# Patient Record
Sex: FEMALE | Race: OTHER | HISPANIC OR LATINO | URBAN - METROPOLITAN AREA
[De-identification: names, ages, dates, MRNs, and addresses within clinical notes are randomized per-mention and may not be internally consistent; named-entity substitution may affect disease eponyms.]

---

## 2017-01-05 ENCOUNTER — EMERGENCY (EMERGENCY)
Facility: HOSPITAL | Age: 42
LOS: 1 days | Discharge: HOME | End: 2017-01-05
Attending: EMERGENCY MEDICINE
Payer: MEDICAID

## 2017-01-05 VITALS
OXYGEN SATURATION: 97 % | RESPIRATION RATE: 18 BRPM | TEMPERATURE: 98 F | DIASTOLIC BLOOD PRESSURE: 90 MMHG | HEART RATE: 117 BPM | SYSTOLIC BLOOD PRESSURE: 130 MMHG

## 2017-01-05 DIAGNOSIS — N64.4 MASTODYNIA: ICD-10-CM

## 2017-01-05 DIAGNOSIS — N63 UNSPECIFIED LUMP IN BREAST: ICD-10-CM

## 2017-01-05 PROCEDURE — T1013: CPT

## 2017-01-05 PROCEDURE — 76604 US EXAM CHEST: CPT

## 2017-01-05 PROCEDURE — 99284 EMERGENCY DEPT VISIT MOD MDM: CPT | Mod: 25

## 2017-01-05 PROCEDURE — 76604 US EXAM CHEST: CPT | Mod: 26

## 2017-01-05 PROCEDURE — 99284 EMERGENCY DEPT VISIT MOD MDM: CPT

## 2017-01-05 RX ORDER — IBUPROFEN 200 MG
1 TABLET ORAL
Qty: 30 | Refills: 0 | OUTPATIENT
Start: 2017-01-05

## 2017-01-05 RX ORDER — FAMOTIDINE 10 MG/ML
1 INJECTION INTRAVENOUS
Qty: 60 | Refills: 0 | OUTPATIENT
Start: 2017-01-05 | End: 2017-02-04

## 2017-01-05 NOTE — ED STATDOCS - DETAILS:
The history, relevant review of systems, past medical and surgical history, medical decision making, and physical examination was documented by the scribe in my presence and I, Kaitlin Plata, attest to the accuracy of the documentation.

## 2017-01-05 NOTE — ED ADULT TRIAGE NOTE - CHIEF COMPLAINT QUOTE
Patient arrived to ED today with c/o pain to her right breast.  Patient states she has swelling to her right breast and hardness to her right breast.  Patient states she has had cyst removed in the past from left breast.

## 2017-01-05 NOTE — ED STATDOCS - PROGRESS NOTE DETAILS
pt advised she has a cyst vs mass in R breast and she was told she MUST get follow up as this xould be a neoplasm.

## 2017-01-05 NOTE — ED STATDOCS - SKIN COLOR, MLM
Ebonie as female chaperone during exam. no masses or induration. No discharge from nipple. No b/l erythema. Mild tenderness to b/l breast.

## 2017-01-05 NOTE — ED STATDOCS - OBJECTIVE STATEMENT
This is a 40 y/o F with hx of gastritis and cysts s/p cyst removal c/o breast pain x 8 days. Denies CP, cough, and fever.  Ebonie at bedside.

## 2017-01-05 NOTE — ED STATDOCS - NS ED MD SCRIBE ATTENDING SCRIBE SECTIONS
HISTORY OF PRESENT ILLNESS/REVIEW OF SYSTEMS/PAST MEDICAL/SURGICAL/SOCIAL HISTORY/HIV/VITAL SIGNS( Pullset)/PHYSICAL EXAM/DISPOSITION

## 2017-01-19 ENCOUNTER — APPOINTMENT (OUTPATIENT)
Dept: TRAUMA SURGERY | Facility: CLINIC | Age: 42
End: 2017-01-19

## 2017-01-19 VITALS
HEART RATE: 106 BPM | WEIGHT: 152 LBS | OXYGEN SATURATION: 98 % | RESPIRATION RATE: 16 BRPM | HEIGHT: 62.5 IN | TEMPERATURE: 98.8 F | DIASTOLIC BLOOD PRESSURE: 88 MMHG | SYSTOLIC BLOOD PRESSURE: 139 MMHG | BODY MASS INDEX: 27.27 KG/M2

## 2017-01-20 ENCOUNTER — APPOINTMENT (OUTPATIENT)
Dept: SURGERY | Facility: CLINIC | Age: 42
End: 2017-01-20

## 2017-01-20 VITALS
WEIGHT: 154 LBS | BODY MASS INDEX: 28.7 KG/M2 | OXYGEN SATURATION: 98 % | TEMPERATURE: 98.7 F | RESPIRATION RATE: 16 BRPM | DIASTOLIC BLOOD PRESSURE: 80 MMHG | HEIGHT: 61.5 IN | SYSTOLIC BLOOD PRESSURE: 122 MMHG | HEART RATE: 107 BPM

## 2017-01-20 DIAGNOSIS — Z87.2 PERSONAL HISTORY OF DISEASES OF THE SKIN AND SUBCUTANEOUS TISSUE: ICD-10-CM

## 2017-01-23 ENCOUNTER — APPOINTMENT (OUTPATIENT)
Dept: MAMMOGRAPHY | Facility: CLINIC | Age: 42
End: 2017-01-23

## 2017-01-23 ENCOUNTER — APPOINTMENT (OUTPATIENT)
Dept: ULTRASOUND IMAGING | Facility: CLINIC | Age: 42
End: 2017-01-23

## 2017-01-23 ENCOUNTER — OUTPATIENT (OUTPATIENT)
Dept: OUTPATIENT SERVICES | Facility: HOSPITAL | Age: 42
LOS: 1 days | End: 2017-01-23
Payer: MEDICAID

## 2017-01-23 DIAGNOSIS — Z00.8 ENCOUNTER FOR OTHER GENERAL EXAMINATION: ICD-10-CM

## 2017-01-23 PROCEDURE — 77066 DX MAMMO INCL CAD BI: CPT

## 2017-01-23 PROCEDURE — 76641 ULTRASOUND BREAST COMPLETE: CPT

## 2017-01-23 PROCEDURE — G0279: CPT

## 2017-01-25 ENCOUNTER — RESULT REVIEW (OUTPATIENT)
Age: 42
End: 2017-01-25

## 2017-01-26 ENCOUNTER — APPOINTMENT (OUTPATIENT)
Dept: ULTRASOUND IMAGING | Facility: CLINIC | Age: 42
End: 2017-01-26

## 2017-01-26 ENCOUNTER — OUTPATIENT (OUTPATIENT)
Dept: OUTPATIENT SERVICES | Facility: HOSPITAL | Age: 42
LOS: 1 days | End: 2017-01-26
Payer: MEDICAID

## 2017-01-26 DIAGNOSIS — R92.8 OTHER ABNORMAL AND INCONCLUSIVE FINDINGS ON DIAGNOSTIC IMAGING OF BREAST: ICD-10-CM

## 2017-01-26 PROCEDURE — 77066 DX MAMMO INCL CAD BI: CPT

## 2017-01-26 PROCEDURE — 19083 BX BREAST 1ST LESION US IMAG: CPT

## 2017-01-26 PROCEDURE — A4648: CPT

## 2017-02-02 ENCOUNTER — APPOINTMENT (OUTPATIENT)
Dept: SURGERY | Facility: CLINIC | Age: 42
End: 2017-02-02

## 2017-02-02 VITALS
SYSTOLIC BLOOD PRESSURE: 137 MMHG | WEIGHT: 154 LBS | OXYGEN SATURATION: 99 % | DIASTOLIC BLOOD PRESSURE: 96 MMHG | HEIGHT: 61.5 IN | HEART RATE: 84 BPM | BODY MASS INDEX: 28.7 KG/M2 | RESPIRATION RATE: 17 BRPM | TEMPERATURE: 98.8 F

## 2017-09-11 ENCOUNTER — APPOINTMENT (OUTPATIENT)
Dept: FAMILY MEDICINE | Facility: CLINIC | Age: 42
End: 2017-09-11
Payer: MEDICAID

## 2017-09-11 VITALS
TEMPERATURE: 98.2 F | DIASTOLIC BLOOD PRESSURE: 88 MMHG | HEART RATE: 68 BPM | BODY MASS INDEX: 33.55 KG/M2 | SYSTOLIC BLOOD PRESSURE: 144 MMHG | WEIGHT: 180 LBS | OXYGEN SATURATION: 99 % | HEIGHT: 61.5 IN

## 2017-09-11 DIAGNOSIS — Z76.89 PERSONS ENCOUNTERING HEALTH SERVICES IN OTHER SPECIFIED CIRCUMSTANCES: ICD-10-CM

## 2017-09-11 DIAGNOSIS — Z83.3 FAMILY HISTORY OF DIABETES MELLITUS: ICD-10-CM

## 2017-09-11 PROCEDURE — 99204 OFFICE O/P NEW MOD 45 MIN: CPT | Mod: 25

## 2017-09-11 PROCEDURE — 93000 ELECTROCARDIOGRAM COMPLETE: CPT

## 2017-09-11 PROCEDURE — 36415 COLL VENOUS BLD VENIPUNCTURE: CPT

## 2017-09-14 ENCOUNTER — RX RENEWAL (OUTPATIENT)
Age: 42
End: 2017-09-14

## 2017-09-14 LAB
ALBUMIN SERPL ELPH-MCNC: 4.3 G/DL
ALP BLD-CCNC: 45 U/L
ALT SERPL-CCNC: 20 U/L
ANION GAP SERPL CALC-SCNC: 16 MMOL/L
AST SERPL-CCNC: 15 U/L
BASOPHILS # BLD AUTO: 0.03 K/UL
BASOPHILS NFR BLD AUTO: 0.4 %
BILIRUB SERPL-MCNC: 0.5 MG/DL
BUN SERPL-MCNC: 11 MG/DL
CALCIUM SERPL-MCNC: 9.4 MG/DL
CHLORIDE SERPL-SCNC: 100 MMOL/L
CHOLEST SERPL-MCNC: 184 MG/DL
CHOLEST/HDLC SERPL: 5 RATIO
CO2 SERPL-SCNC: 24 MMOL/L
CREAT SERPL-MCNC: 0.85 MG/DL
EOSINOPHIL # BLD AUTO: 0.14 K/UL
EOSINOPHIL NFR BLD AUTO: 1.8 %
GLUCOSE SERPL-MCNC: 159 MG/DL
HCT VFR BLD CALC: 42.6 %
HDLC SERPL-MCNC: 37 MG/DL
HGB BLD-MCNC: 14.1 G/DL
IMM GRANULOCYTES NFR BLD AUTO: 0.3 %
INR PPP: 0.95 RATIO
LDLC SERPL CALC-MCNC: 108 MG/DL
LYMPHOCYTES # BLD AUTO: 2.98 K/UL
LYMPHOCYTES NFR BLD AUTO: 38.6 %
MAN DIFF?: NORMAL
MCHC RBC-ENTMCNC: 29 PG
MCHC RBC-ENTMCNC: 33.1 GM/DL
MCV RBC AUTO: 87.5 FL
MONOCYTES # BLD AUTO: 0.5 K/UL
MONOCYTES NFR BLD AUTO: 6.5 %
NEUTROPHILS # BLD AUTO: 4.05 K/UL
NEUTROPHILS NFR BLD AUTO: 52.4 %
PLATELET # BLD AUTO: 315 K/UL
POTASSIUM SERPL-SCNC: 3.4 MMOL/L
PROT SERPL-MCNC: 7.5 G/DL
PT BLD: 10.7 SEC
RBC # BLD: 4.87 M/UL
RBC # FLD: 13.6 %
SODIUM SERPL-SCNC: 140 MMOL/L
TRIGL SERPL-MCNC: 197 MG/DL
TSH SERPL-ACNC: 8.13 UIU/ML
WBC # FLD AUTO: 7.72 K/UL

## 2017-09-29 ENCOUNTER — APPOINTMENT (OUTPATIENT)
Dept: FAMILY MEDICINE | Facility: CLINIC | Age: 42
End: 2017-09-29
Payer: MEDICAID

## 2017-09-29 VITALS
HEIGHT: 61.5 IN | HEART RATE: 75 BPM | OXYGEN SATURATION: 99 % | SYSTOLIC BLOOD PRESSURE: 134 MMHG | DIASTOLIC BLOOD PRESSURE: 73 MMHG | BODY MASS INDEX: 33.36 KG/M2 | TEMPERATURE: 98.2 F | WEIGHT: 179 LBS

## 2017-09-29 DIAGNOSIS — Z01.419 ENCOUNTER FOR GYNECOLOGICAL EXAMINATION (GENERAL) (ROUTINE) W/OUT ABNORMAL FINDINGS: ICD-10-CM

## 2017-09-29 PROCEDURE — 90686 IIV4 VACC NO PRSV 0.5 ML IM: CPT

## 2017-09-29 PROCEDURE — 99214 OFFICE O/P EST MOD 30 MIN: CPT | Mod: 25

## 2017-09-29 PROCEDURE — G0008: CPT

## 2017-10-05 LAB — CYTOLOGY CVX/VAG DOC THIN PREP: NORMAL

## 2017-11-10 ENCOUNTER — APPOINTMENT (OUTPATIENT)
Dept: FAMILY MEDICINE | Facility: CLINIC | Age: 42
End: 2017-11-10
Payer: MEDICAID

## 2017-11-10 VITALS
SYSTOLIC BLOOD PRESSURE: 133 MMHG | OXYGEN SATURATION: 100 % | WEIGHT: 177 LBS | HEIGHT: 61.5 IN | TEMPERATURE: 98.1 F | HEART RATE: 63 BPM | DIASTOLIC BLOOD PRESSURE: 79 MMHG | BODY MASS INDEX: 32.99 KG/M2

## 2017-11-10 PROCEDURE — 99214 OFFICE O/P EST MOD 30 MIN: CPT | Mod: 25

## 2017-11-10 PROCEDURE — 36415 COLL VENOUS BLD VENIPUNCTURE: CPT

## 2017-11-13 ENCOUNTER — APPOINTMENT (OUTPATIENT)
Dept: SURGERY | Facility: CLINIC | Age: 42
End: 2017-11-13
Payer: MEDICAID

## 2017-11-13 ENCOUNTER — APPOINTMENT (OUTPATIENT)
Dept: SURGERY | Facility: CLINIC | Age: 42
End: 2017-11-13

## 2017-11-13 VITALS
SYSTOLIC BLOOD PRESSURE: 123 MMHG | TEMPERATURE: 98.3 F | DIASTOLIC BLOOD PRESSURE: 81 MMHG | BODY MASS INDEX: 33.42 KG/M2 | RESPIRATION RATE: 12 BRPM | HEIGHT: 61 IN | HEART RATE: 73 BPM | WEIGHT: 177 LBS

## 2017-11-13 DIAGNOSIS — F15.90 OTHER STIMULANT USE, UNSPECIFIED, UNCOMPLICATED: ICD-10-CM

## 2017-11-13 LAB
T3FREE SERPL-MCNC: 3.35 PG/ML
T4 FREE SERPL-MCNC: 1.4 NG/DL
TSH SERPL-ACNC: 5.94 UIU/ML

## 2017-11-13 PROCEDURE — 99214 OFFICE O/P EST MOD 30 MIN: CPT

## 2017-11-15 ENCOUNTER — RX RENEWAL (OUTPATIENT)
Age: 42
End: 2017-11-15

## 2017-11-26 PROBLEM — F15.90 CAFFEINE USE: Status: ACTIVE | Noted: 2017-01-19

## 2018-01-29 ENCOUNTER — OUTPATIENT (OUTPATIENT)
Dept: OUTPATIENT SERVICES | Facility: HOSPITAL | Age: 43
LOS: 1 days | End: 2018-01-29
Payer: MEDICAID

## 2018-01-29 ENCOUNTER — APPOINTMENT (OUTPATIENT)
Dept: MAMMOGRAPHY | Facility: CLINIC | Age: 43
End: 2018-01-29
Payer: MEDICAID

## 2018-01-29 ENCOUNTER — APPOINTMENT (OUTPATIENT)
Dept: ULTRASOUND IMAGING | Facility: CLINIC | Age: 43
End: 2018-01-29
Payer: MEDICAID

## 2018-01-29 DIAGNOSIS — Z00.8 ENCOUNTER FOR OTHER GENERAL EXAMINATION: ICD-10-CM

## 2018-01-29 PROCEDURE — 77067 SCR MAMMO BI INCL CAD: CPT

## 2018-01-29 PROCEDURE — 76641 ULTRASOUND BREAST COMPLETE: CPT

## 2018-01-29 PROCEDURE — 77067 SCR MAMMO BI INCL CAD: CPT | Mod: 26

## 2018-01-29 PROCEDURE — 77063 BREAST TOMOSYNTHESIS BI: CPT | Mod: 26

## 2018-01-29 PROCEDURE — 76641 ULTRASOUND BREAST COMPLETE: CPT | Mod: 26,50

## 2018-01-29 PROCEDURE — 77063 BREAST TOMOSYNTHESIS BI: CPT

## 2018-02-16 ENCOUNTER — APPOINTMENT (OUTPATIENT)
Dept: SURGERY | Facility: CLINIC | Age: 43
End: 2018-02-16

## 2018-03-19 ENCOUNTER — APPOINTMENT (OUTPATIENT)
Dept: SURGERY | Facility: CLINIC | Age: 43
End: 2018-03-19
Payer: MEDICAID

## 2018-03-19 VITALS
OXYGEN SATURATION: 100 % | HEART RATE: 81 BPM | DIASTOLIC BLOOD PRESSURE: 88 MMHG | WEIGHT: 177 LBS | SYSTOLIC BLOOD PRESSURE: 130 MMHG | BODY MASS INDEX: 33.42 KG/M2 | TEMPERATURE: 98 F | HEIGHT: 61 IN

## 2018-03-19 DIAGNOSIS — D24.2 BENIGN NEOPLASM OF LEFT BREAST: ICD-10-CM

## 2018-03-19 PROCEDURE — 99214 OFFICE O/P EST MOD 30 MIN: CPT

## 2018-03-19 RX ORDER — LEVOTHYROXINE SODIUM 0.03 MG/1
25 TABLET ORAL DAILY
Qty: 90 | Refills: 1 | Status: DISCONTINUED | COMMUNITY
Start: 2017-09-14 | End: 2018-03-19

## 2018-03-20 PROBLEM — D24.2 FIBROADENOMA OF LEFT BREAST: Status: ACTIVE | Noted: 2017-11-26

## 2018-05-23 ENCOUNTER — APPOINTMENT (OUTPATIENT)
Dept: FAMILY MEDICINE | Facility: CLINIC | Age: 43
End: 2018-05-23
Payer: MEDICAID

## 2018-05-23 VITALS
OXYGEN SATURATION: 98 % | TEMPERATURE: 98.7 F | BODY MASS INDEX: 33.26 KG/M2 | WEIGHT: 176 LBS | HEART RATE: 64 BPM | SYSTOLIC BLOOD PRESSURE: 139 MMHG | DIASTOLIC BLOOD PRESSURE: 86 MMHG

## 2018-05-23 PROCEDURE — 99214 OFFICE O/P EST MOD 30 MIN: CPT

## 2018-05-23 NOTE — HISTORY OF PRESENT ILLNESS
[FreeTextEntry1] : meds refills [de-identified] : 43 y/o HF w/ Hx Breast fibroadenoma, recent Dx of Hypothyroidism.Not taking med for aprox 1 week.\par \par Pt states has heartburn, reflux and dyspepsia for aprox 3 months. Has OTC ranitidine with mild improvement of symptoms.\par Pt tolerating well meds. \par Normal BM.

## 2018-05-23 NOTE — CURRENT MEDS
[None] : Patient does not have any barriers to medication adherence [Takes medication as prescribed] : does not take

## 2018-05-23 NOTE — ASSESSMENT
[FreeTextEntry1] : -Hypothyroidism: reassume levothyroxine.\par \par -GERD: start famotifine. Avoid irritants foods, like coffe, sodas, citrus and spice .\par \par -Breast Fibroadenoma: seen by Dr Coon. Continue USG and Mammo annual.\par \par -Pt will schedule CPE.

## 2018-05-23 NOTE — PAST MEDICAL HISTORY
[Menstruating] : menstruating [Definite ___ (Date)] : the last menstrual period was [unfilled] [Total Preg ___] : G[unfilled] [Living ___] : Living: [unfilled]

## 2018-06-27 ENCOUNTER — LABORATORY RESULT (OUTPATIENT)
Age: 43
End: 2018-06-27

## 2018-06-27 ENCOUNTER — APPOINTMENT (OUTPATIENT)
Dept: FAMILY MEDICINE | Facility: CLINIC | Age: 43
End: 2018-06-27
Payer: MEDICAID

## 2018-06-27 VITALS
OXYGEN SATURATION: 97 % | BODY MASS INDEX: 33.23 KG/M2 | TEMPERATURE: 98.6 F | WEIGHT: 176 LBS | HEART RATE: 72 BPM | SYSTOLIC BLOOD PRESSURE: 129 MMHG | HEIGHT: 61 IN | DIASTOLIC BLOOD PRESSURE: 82 MMHG

## 2018-06-27 PROCEDURE — 36415 COLL VENOUS BLD VENIPUNCTURE: CPT

## 2018-06-27 PROCEDURE — G0444 DEPRESSION SCREEN ANNUAL: CPT

## 2018-06-27 PROCEDURE — 99396 PREV VISIT EST AGE 40-64: CPT | Mod: 25

## 2018-06-27 RX ORDER — AMOXICILLIN 500 MG/1
500 CAPSULE ORAL
Qty: 21 | Refills: 0 | Status: DISCONTINUED | COMMUNITY
Start: 2018-02-13

## 2018-06-27 NOTE — COUNSELING
[Weight management counseling provided] : Weight management [Healthy eating counseling provided] : healthy eating [Activity counseling provided] : activity [Behavioral health counseling provided] : behavioral health  [Target Wt Loss Goal ___] : Target weight loss goal [unfilled] lbs [Decrease Portions] : Decrease food portions [Walking] : Walking [None] : None [Good understanding] : Patient has a good understanding of lifestyle changes and the steps needed to achieve self management goals [ - Annual Depression Screening] : Annual Depression Screening

## 2018-06-27 NOTE — HEALTH RISK ASSESSMENT
[Good] : ~his/her~  mood as  good [No falls in past year] : Patient reported no falls in the past year [0] : 2) Feeling down, depressed, or hopeless: Not at all (0) [Patient reported mammogram was normal] : Patient reported mammogram was normal [Patient reported PAP Smear was normal] : Patient reported PAP Smear was normal [HIV Test offered] : HIV Test offered [None] : None [With Family] : lives with family [Employed] : employed [] :  [# Of Children ___] : has [unfilled] children [Sexually Active] : sexually active [Feels Safe at Home] : Feels safe at home [Fully functional (bathing, dressing, toileting, transferring, walking, feeding)] : Fully functional (bathing, dressing, toileting, transferring, walking, feeding) [Fully functional (using the telephone, shopping, preparing meals, housekeeping, doing laundry, using] : Fully functional and needs no help or supervision to perform IADLs (using the telephone, shopping, preparing meals, housekeeping, doing laundry, using transportation, managing medications and managing finances) [Smoke Detector] : smoke detector [Seat Belt] :  uses seat belt [Discussed at today's visit] : Advance Directives Discussed at today's visit [Designated Healthcare Proxy] : Designated healthcare proxy [Name: ___] : Health Care Proxy's Name: [unfilled]  [Relationship: ___] : Relationship: [unfilled] [] : No [Change in mental status noted] : No change in mental status noted [Language] : denies difficulty with language [Reports changes in hearing] : Reports no changes in hearing [Reports changes in vision] : Reports no changes in vision [Reports changes in dental health] : Reports no changes in dental health [MammogramDate] : 2018 [PapSmearDate] : 2017 [HepatitisCComments] : N/A [FreeTextEntry2] : Verenice

## 2018-06-27 NOTE — HISTORY OF PRESENT ILLNESS
[FreeTextEntry1] : Annual physical [de-identified] : 44 y/o HF, w/ hx Breast Fibroadenoma, GERD presents today for annual physical.\par \par reports to feel better of GERd symptoms since started on famotidine.

## 2018-06-27 NOTE — ASSESSMENT
[FreeTextEntry1] : CPE: blood and UA today\par \par HIV: screening\par \par Mammo: up to date. w/ Dr Coon\par \par Hypothyroidism: On levothyroxine. TSH today.\par \par GERD: doing better. Advise prn famotidine.

## 2018-07-05 ENCOUNTER — APPOINTMENT (OUTPATIENT)
Dept: FAMILY MEDICINE | Facility: CLINIC | Age: 43
End: 2018-07-05
Payer: MEDICAID

## 2018-07-05 VITALS
HEIGHT: 61 IN | OXYGEN SATURATION: 98 % | TEMPERATURE: 98.3 F | WEIGHT: 173 LBS | DIASTOLIC BLOOD PRESSURE: 87 MMHG | BODY MASS INDEX: 32.66 KG/M2 | HEART RATE: 91 BPM | SYSTOLIC BLOOD PRESSURE: 143 MMHG

## 2018-07-05 PROCEDURE — 99214 OFFICE O/P EST MOD 30 MIN: CPT

## 2018-07-05 NOTE — HISTORY OF PRESENT ILLNESS
[FreeTextEntry1] : Abnormal lab results f/up [de-identified] : 42 y/o HF, presents today for f/up in abnormal lab results.\par \par Pt w/ glucose: 253, HbA1c: 11. No hx Diabetes in the past.

## 2018-07-05 NOTE — COUNSELING
[Weight management counseling provided] : Weight management [Healthy eating counseling provided] : healthy eating [Activity counseling provided] : activity [Behavioral health counseling provided] : behavioral health  [Weight Self Once Weekly] : Weight self once weekly [Keep Food Diary] : Keep food diary [Low Fat Diet] : Low fat diet [Decrease Portions] : Decrease food portions [Low Salt Diet] : Low salt diet [Walking] : Walking [None] : None [Good understanding] : Patient has a good understanding of lifestyle changes and the steps needed to achieve self management goals

## 2018-07-05 NOTE — ASSESSMENT
[FreeTextEntry1] : New Onset DM: Education and counseling was given to pt with diet guidance.\par \par -Start metformin 850mg BID.\par -Start Onglyza 5mg daily.\par -Risks and benefits d/w pt.\par -F/up in 4 weeks,.\par \par -Hypothyroidism: continue Levothyroxine.

## 2018-07-06 LAB
25(OH)D3 SERPL-MCNC: 22.2 NG/ML
ALBUMIN SERPL ELPH-MCNC: 4.2 G/DL
ALP BLD-CCNC: 69 U/L
ALT SERPL-CCNC: 31 U/L
ANION GAP SERPL CALC-SCNC: 14 MMOL/L
APPEARANCE: CLEAR
AST SERPL-CCNC: 17 U/L
BASOPHILS # BLD AUTO: 0.03 K/UL
BASOPHILS NFR BLD AUTO: 0.3 %
BILIRUB SERPL-MCNC: 0.6 MG/DL
BILIRUBIN URINE: NEGATIVE
BLOOD URINE: NEGATIVE
BUN SERPL-MCNC: 14 MG/DL
CALCIUM SERPL-MCNC: 9.4 MG/DL
CHLORIDE SERPL-SCNC: 99 MMOL/L
CHOLEST SERPL-MCNC: 188 MG/DL
CHOLEST/HDLC SERPL: 5.2 RATIO
CO2 SERPL-SCNC: 22 MMOL/L
COLOR: YELLOW
CREAT SERPL-MCNC: 0.82 MG/DL
EOSINOPHIL # BLD AUTO: 0.17 K/UL
EOSINOPHIL NFR BLD AUTO: 1.9 %
GLUCOSE QUALITATIVE U: 1000 MG/DL
GLUCOSE SERPL-MCNC: 253 MG/DL
HCT VFR BLD CALC: 45.6 %
HDLC SERPL-MCNC: 36 MG/DL
HGB BLD-MCNC: 14.9 G/DL
HIV1+2 AB SPEC QL IA.RAPID: NONREACTIVE
IMM GRANULOCYTES NFR BLD AUTO: 0.2 %
KETONES URINE: ABNORMAL
LDLC SERPL CALC-MCNC: 104 MG/DL
LEUKOCYTE ESTERASE URINE: NEGATIVE
LYMPHOCYTES # BLD AUTO: 3.28 K/UL
LYMPHOCYTES NFR BLD AUTO: 36.9 %
MAN DIFF?: NORMAL
MCHC RBC-ENTMCNC: 27.3 PG
MCHC RBC-ENTMCNC: 32.7 GM/DL
MCV RBC AUTO: 83.7 FL
MONOCYTES # BLD AUTO: 0.65 K/UL
MONOCYTES NFR BLD AUTO: 7.3 %
NEUTROPHILS # BLD AUTO: 4.74 K/UL
NEUTROPHILS NFR BLD AUTO: 53.4 %
NITRITE URINE: NEGATIVE
PH URINE: 5
PLATELET # BLD AUTO: 276 K/UL
POTASSIUM SERPL-SCNC: 4.3 MMOL/L
PROT SERPL-MCNC: 7.3 G/DL
PROTEIN URINE: NEGATIVE MG/DL
RBC # BLD: 5.45 M/UL
RBC # FLD: 13.2 %
SODIUM SERPL-SCNC: 135 MMOL/L
SPECIFIC GRAVITY URINE: 1.04
TRIGL SERPL-MCNC: 242 MG/DL
TSH SERPL-ACNC: 5.32 UIU/ML
UROBILINOGEN URINE: NEGATIVE MG/DL
WBC # FLD AUTO: 8.89 K/UL

## 2018-07-12 ENCOUNTER — RX RENEWAL (OUTPATIENT)
Age: 43
End: 2018-07-12

## 2018-07-16 ENCOUNTER — RX RENEWAL (OUTPATIENT)
Age: 43
End: 2018-07-16

## 2018-08-02 ENCOUNTER — APPOINTMENT (OUTPATIENT)
Dept: FAMILY MEDICINE | Facility: CLINIC | Age: 43
End: 2018-08-02
Payer: MEDICAID

## 2018-08-02 ENCOUNTER — RX RENEWAL (OUTPATIENT)
Age: 43
End: 2018-08-02

## 2018-08-02 VITALS
TEMPERATURE: 98.6 F | HEIGHT: 61 IN | SYSTOLIC BLOOD PRESSURE: 130 MMHG | OXYGEN SATURATION: 98 % | DIASTOLIC BLOOD PRESSURE: 79 MMHG | BODY MASS INDEX: 32.1 KG/M2 | HEART RATE: 71 BPM | WEIGHT: 170 LBS

## 2018-08-02 LAB
GLUCOSE BLDC GLUCOMTR-MCNC: 195
HBA1C MFR BLD HPLC: 11.2

## 2018-08-02 PROCEDURE — 36415 COLL VENOUS BLD VENIPUNCTURE: CPT

## 2018-08-02 PROCEDURE — 99214 OFFICE O/P EST MOD 30 MIN: CPT | Mod: 25

## 2018-08-02 RX ORDER — SAXAGLIPTIN 5 MG/1
5 TABLET, FILM COATED ORAL
Qty: 30 | Refills: 0 | Status: DISCONTINUED | COMMUNITY
Start: 2018-07-05 | End: 2018-08-02

## 2018-08-02 NOTE — ASSESSMENT
[FreeTextEntry1] : New Onset DM: Education and counseling was given to pt with diet guidance. Glucose: 195, HbA1c:11.2. Pt states is unable to keep medication refrigerated, due to living temporary in a place with no refrigerator.\par \par -Continue metformin 850mg BID.\par -D/C Onglyza 5mg daily.Not cover by insurance. Start invokana 100mg and januvia 100mg daily.\par -Risks and benefits d/w pt.\par -F/up in 4 weeks,.\par \par -Hypothyroidism: continue Levothyroxine.

## 2018-08-02 NOTE — HISTORY OF PRESENT ILLNESS
[FreeTextEntry1] : DM f/up [de-identified] : 42 y/o HF, new onset DM, presents today for f/up after treatment started 1 month ago.\par \par Pt reports to feel fine. States ongliza was not cover by insurance and only started on metformin and diet..\par \par Pt w/ glucose: 253, HbA1c: 11. No hx Diabetes in the past.

## 2018-08-09 ENCOUNTER — RECORD ABSTRACTING (OUTPATIENT)
Age: 43
End: 2018-08-09

## 2018-08-13 ENCOUNTER — RX RENEWAL (OUTPATIENT)
Age: 43
End: 2018-08-13

## 2018-08-13 RX ORDER — ALOGLIPTIN 25 MG/1
25 TABLET, FILM COATED ORAL DAILY
Qty: 90 | Refills: 1 | Status: DISCONTINUED | COMMUNITY
Start: 2018-08-02 | End: 2018-08-13

## 2018-08-14 ENCOUNTER — RX RENEWAL (OUTPATIENT)
Age: 43
End: 2018-08-14

## 2018-10-10 ENCOUNTER — MED ADMIN CHARGE (OUTPATIENT)
Age: 43
End: 2018-10-10

## 2018-10-10 ENCOUNTER — APPOINTMENT (OUTPATIENT)
Dept: FAMILY MEDICINE | Facility: CLINIC | Age: 43
End: 2018-10-10
Payer: MEDICAID

## 2018-10-10 VITALS
DIASTOLIC BLOOD PRESSURE: 93 MMHG | HEIGHT: 61 IN | BODY MASS INDEX: 33.23 KG/M2 | WEIGHT: 176 LBS | HEART RATE: 85 BPM | TEMPERATURE: 98.6 F | SYSTOLIC BLOOD PRESSURE: 127 MMHG | OXYGEN SATURATION: 98 %

## 2018-10-10 DIAGNOSIS — E11.9 TYPE 2 DIABETES MELLITUS W/OUT COMPLICATIONS: ICD-10-CM

## 2018-10-10 LAB — HBA1C MFR BLD HPLC: 7.3

## 2018-10-10 PROCEDURE — 99214 OFFICE O/P EST MOD 30 MIN: CPT | Mod: 25

## 2018-10-10 PROCEDURE — G0008: CPT

## 2018-10-10 PROCEDURE — 83036 HEMOGLOBIN GLYCOSYLATED A1C: CPT | Mod: QW

## 2018-10-10 PROCEDURE — 90686 IIV4 VACC NO PRSV 0.5 ML IM: CPT

## 2018-10-10 RX ORDER — SITAGLIPTIN 100 MG/1
100 TABLET, FILM COATED ORAL
Qty: 30 | Refills: 3 | Status: DISCONTINUED | COMMUNITY
Start: 2018-08-13 | End: 2018-10-10

## 2018-10-10 RX ORDER — CANAGLIFLOZIN 100 MG/1
100 TABLET, FILM COATED ORAL
Qty: 30 | Refills: 0 | Status: DISCONTINUED | COMMUNITY
Start: 2018-08-02 | End: 2018-10-10

## 2018-10-10 NOTE — ASSESSMENT
[FreeTextEntry1] : New Onset DM:Dx 7/2018:  Education and counseling was given to pt with diet guidance. , HbA1c:7.3% from 11.2.\par -Continue metformin 850mg BID.\par -Risks and benefits d/w pt.\par \par -Hypothyroidism: continue Levothyroxine.\par \par Flu shot today\par \par CPE: 2018.\par \par Risks and benefits of meds compliance was d/w pt, due to limitation of helath coverage at the end of this month.

## 2018-10-10 NOTE — HISTORY OF PRESENT ILLNESS
[FreeTextEntry1] : DM f/up [de-identified] : 42 y/o HF, new onset DM Dx 7/2018, presents today for f/up after treatment started 1 month ago.\par \par Pt reports to feel fine. States ongliza was not cover by insurance and only started on metformin and diet..\par \par Pt states has Immigrations issues, and will not have health insurance at the end of this month.

## 2018-12-24 ENCOUNTER — APPOINTMENT (OUTPATIENT)
Dept: FAMILY MEDICINE | Facility: CLINIC | Age: 43
End: 2018-12-24

## 2019-03-06 ENCOUNTER — APPOINTMENT (OUTPATIENT)
Dept: MAMMOGRAPHY | Facility: CLINIC | Age: 44
End: 2019-03-06
Payer: COMMERCIAL

## 2019-03-06 ENCOUNTER — OUTPATIENT (OUTPATIENT)
Dept: OUTPATIENT SERVICES | Facility: HOSPITAL | Age: 44
LOS: 1 days | End: 2019-03-06
Payer: MEDICAID

## 2019-03-06 ENCOUNTER — APPOINTMENT (OUTPATIENT)
Dept: ULTRASOUND IMAGING | Facility: CLINIC | Age: 44
End: 2019-03-06
Payer: COMMERCIAL

## 2019-03-06 DIAGNOSIS — Z12.31 ENCOUNTER FOR SCREENING MAMMOGRAM FOR MALIGNANT NEOPLASM OF BREAST: ICD-10-CM

## 2019-03-06 PROCEDURE — 77063 BREAST TOMOSYNTHESIS BI: CPT | Mod: 26

## 2019-03-06 PROCEDURE — 77063 BREAST TOMOSYNTHESIS BI: CPT

## 2019-03-06 PROCEDURE — 77067 SCR MAMMO BI INCL CAD: CPT

## 2019-03-06 PROCEDURE — 76641 ULTRASOUND BREAST COMPLETE: CPT | Mod: 26,50

## 2019-03-06 PROCEDURE — 77067 SCR MAMMO BI INCL CAD: CPT | Mod: 26

## 2019-03-06 PROCEDURE — 76641 ULTRASOUND BREAST COMPLETE: CPT

## 2019-03-25 ENCOUNTER — APPOINTMENT (OUTPATIENT)
Dept: BREAST CENTER | Facility: CLINIC | Age: 44
End: 2019-03-25
Payer: COMMERCIAL

## 2019-03-25 VITALS
SYSTOLIC BLOOD PRESSURE: 144 MMHG | HEART RATE: 67 BPM | DIASTOLIC BLOOD PRESSURE: 86 MMHG | WEIGHT: 157 LBS | HEIGHT: 61 IN | BODY MASS INDEX: 29.64 KG/M2

## 2019-03-25 PROCEDURE — 99214 OFFICE O/P EST MOD 30 MIN: CPT

## 2019-03-25 NOTE — ASSESSMENT
[FreeTextEntry1] : 43 yo premenopausal female with fibroadenomatosis presents for clinical breast exam and review of recent mammogram and sonogram.  She has right breast 0.9 cm fibroadenoma which is stable in size and left breast 0.7 cm fibroadenoma which has decreased in size.  She has multiple nodules/cysts in both the left and right breast and recommendation at this time is for continued observation.  Annual mammogram and sonogram 3/7/2020 and clinical breast exam in 6 months( 9/2019).  Naproxen was ordered and sent to pharmacy for intermittent breast pain.  \par 1. CBE 9/2018\par 2. Annual mammogram and sonogram 1/29/2019\par 3.  Referral to gynecologist given since patient stated she did not have one and wanted a pelvic ultrasound to followup history with cysts.\par

## 2019-03-25 NOTE — PHYSICAL EXAM
[Normocephalic] : normocephalic [Atraumatic] : atraumatic [PERRL] : pupils equal, round and reactive to light [Sclera nonicteric] : sclera nonicteric [Supple] : supple [No Supraclavicular Adenopathy] : no supraclavicular adenopathy [Examined in the supine and seated position] : examined in the supine and seated position [No dominant masses] : no dominant masses left breast [No Nipple Retraction] : no left nipple retraction [No Nipple Discharge] : no left nipple discharge [Breast Mass Left Breast ___cm] : no masses [No Axillary Lymphadenopathy] : no left axillary lymphadenopathy [No Edema] : no edema [No Rashes] : no rashes [No Ulceration] : no ulceration [Breast Nipple Inversion] : nipples not inverted [Breast Nipple Retraction] : nipples not retracted [Breast Nipple Flattening] : nipples not flattened [Breast Nipple Fissures] : nipples not fissured [Breast Abnormal Lactation (Galactorrhea)] : no galactorrhea [Breast Abnormal Secretion Bloody Fluid] : no bloody discharge [Breast Abnormal Secretion Serous Fluid] : no serous discharge [Breast Abnormal Secretion Opalescent Fluid] : no milky discharge [de-identified] : 12:00-12:30 position there is a 1cm palpable well circumscribed mass which is mobile and unattached to the chest wall.  No other dominant masses.  Everted nipple without discharge.  No skin changes. [de-identified] : No dominant masses, normal to palpation. Everted nipple without discharge. No skin changes.

## 2019-03-26 ENCOUNTER — APPOINTMENT (OUTPATIENT)
Dept: SURGERY | Facility: CLINIC | Age: 44
End: 2019-03-26

## 2019-03-29 ENCOUNTER — APPOINTMENT (OUTPATIENT)
Dept: FAMILY MEDICINE | Facility: CLINIC | Age: 44
End: 2019-03-29
Payer: COMMERCIAL

## 2019-03-29 VITALS
TEMPERATURE: 98.2 F | OXYGEN SATURATION: 99 % | SYSTOLIC BLOOD PRESSURE: 146 MMHG | HEIGHT: 61 IN | HEART RATE: 85 BPM | BODY MASS INDEX: 29.83 KG/M2 | WEIGHT: 158 LBS | DIASTOLIC BLOOD PRESSURE: 85 MMHG

## 2019-03-29 LAB — HBA1C MFR BLD HPLC: 11.5

## 2019-03-29 PROCEDURE — 36415 COLL VENOUS BLD VENIPUNCTURE: CPT

## 2019-03-29 PROCEDURE — 99214 OFFICE O/P EST MOD 30 MIN: CPT | Mod: 25

## 2019-03-29 PROCEDURE — 83036 HEMOGLOBIN GLYCOSYLATED A1C: CPT | Mod: QW

## 2019-03-29 NOTE — ASSESSMENT
[FreeTextEntry1] : Uncontrolled  DM:Dx 7/2018:  Education and counseling was given to pt with diet guidance. ,\par  HbA1c:11.5% from 7.3% from 11.2.\par -Increase metformin 1000mg BID.\par -Start glimepiride 4mg daily.\par -Risks and benefits d/w pt.\par -F/up in 2 months.\par -Refused Dietitian and endocrinology referral.\par \par -Hypothyroidism: continue Levothyroxine.\par TFT today.\par \par Flu shot up to date\par \par CPE: 2018.\par \par Risks and benefits of meds compliance was d/w pt.

## 2019-03-29 NOTE — COUNSELING
[Weight management counseling provided] : Weight management [Healthy eating counseling provided] : healthy eating [Activity counseling provided] : activity [Behavioral health counseling provided] : behavioral health  [Weight Self Once Weekly] : Weight self once weekly [Low Fat Diet] : Low fat diet [Low Salt Diet] : Low salt diet [Decrease Portions] : Decrease food portions [Keep Food Diary] : Keep food diary [None] : None [Good understanding] : Patient has a good understanding of lifestyle changes and the steps needed to achieve self management goals

## 2019-03-29 NOTE — HISTORY OF PRESENT ILLNESS
[FreeTextEntry1] : DM f/up [de-identified] : 44 y/o HF, new onset DM Dx 7/2018, presents today for f/up .\par reports not to be compliant with diet. \par last HbA1c: 7.5% in 10/2018.\par Not checking glucose at home\par \par Pt reports to feel fine. States ongliza was not cover by insurance and only started on metformin and diet..\par \par Pt states has Immigrations issues.

## 2019-04-01 ENCOUNTER — RX RENEWAL (OUTPATIENT)
Age: 44
End: 2019-04-01

## 2019-04-01 LAB
ANION GAP SERPL CALC-SCNC: 12 MMOL/L
BUN SERPL-MCNC: 11 MG/DL
CALCIUM SERPL-MCNC: 9.3 MG/DL
CHLORIDE SERPL-SCNC: 102 MMOL/L
CO2 SERPL-SCNC: 24 MMOL/L
CREAT SERPL-MCNC: 0.68 MG/DL
GLUCOSE SERPL-MCNC: 191 MG/DL
POTASSIUM SERPL-SCNC: 3.9 MMOL/L
SODIUM SERPL-SCNC: 138 MMOL/L
T3FREE SERPL-MCNC: 2.86 PG/ML
T4 FREE SERPL-MCNC: 1.6 NG/DL
TSH SERPL-ACNC: 8.81 UIU/ML

## 2019-04-18 ENCOUNTER — RESULT REVIEW (OUTPATIENT)
Age: 44
End: 2019-04-18

## 2019-06-03 ENCOUNTER — APPOINTMENT (OUTPATIENT)
Dept: FAMILY MEDICINE | Facility: CLINIC | Age: 44
End: 2019-06-03
Payer: COMMERCIAL

## 2019-06-03 VITALS
SYSTOLIC BLOOD PRESSURE: 133 MMHG | HEIGHT: 61 IN | TEMPERATURE: 98.2 F | OXYGEN SATURATION: 99 % | DIASTOLIC BLOOD PRESSURE: 85 MMHG | HEART RATE: 86 BPM | BODY MASS INDEX: 31.72 KG/M2 | WEIGHT: 168 LBS

## 2019-06-03 DIAGNOSIS — R10.31 RIGHT LOWER QUADRANT PAIN: ICD-10-CM

## 2019-06-03 LAB — HBA1C MFR BLD HPLC: 7.4

## 2019-06-03 PROCEDURE — 99214 OFFICE O/P EST MOD 30 MIN: CPT | Mod: 25

## 2019-06-03 PROCEDURE — 83036 HEMOGLOBIN GLYCOSYLATED A1C: CPT | Mod: QW

## 2019-06-03 PROCEDURE — 36415 COLL VENOUS BLD VENIPUNCTURE: CPT

## 2019-06-03 NOTE — ASSESSMENT
[FreeTextEntry1] : Better control DM:Dx 7/2018:  Education and counseling was given to pt with diet guidance. ,\par  HbA1c:7.4% from 11.5%.\par -Continue metformin 1000mg BID.\par -Continue glimepiride 4mg daily.\par -Risks and benefits d/w pt.\par -F/up in 3 months.\par -Refused Dietitian and endocrinology referral.\par -Ophthalmology referral.\par \par -Hypothyroidism: continue Levothyroxine 50 mcg daily\par TFT today.\par \par Right LQ pain:\par -USG done. Will request report\par \par Flu shot up to date\par \par CPE: 2018.\par \par Risks and benefits of meds compliance was d/w pt.

## 2019-06-03 NOTE — HISTORY OF PRESENT ILLNESS
[FreeTextEntry1] : DM f/up [de-identified] : 43 y/o HF, new onset DM Dx 7/2018, presents today for f/up .\par Now compliant with meds. \par last HbA1c: 11.5% in 3/2019.\par glucose at home: 140\par \par Seen by Dr Arroyo. States had a recent Gyn USG due to persistent pain in RLQ.\par States pain is not related to menstrual period. is on and off. Reports like sharp pain.

## 2019-06-06 ENCOUNTER — RX RENEWAL (OUTPATIENT)
Age: 44
End: 2019-06-06

## 2019-06-06 LAB
THYROGLOB AB SERPL-ACNC: <20 IU/ML
THYROPEROXIDASE AB SERPL IA-ACNC: <10 IU/ML
TSH SERPL-ACNC: 9.24 UIU/ML

## 2019-09-06 ENCOUNTER — APPOINTMENT (OUTPATIENT)
Dept: FAMILY MEDICINE | Facility: CLINIC | Age: 44
End: 2019-09-06
Payer: COMMERCIAL

## 2019-09-06 VITALS
OXYGEN SATURATION: 98 % | SYSTOLIC BLOOD PRESSURE: 151 MMHG | HEART RATE: 87 BPM | TEMPERATURE: 98.1 F | DIASTOLIC BLOOD PRESSURE: 82 MMHG | BODY MASS INDEX: 33.61 KG/M2 | HEIGHT: 61 IN | WEIGHT: 178 LBS

## 2019-09-06 LAB — HBA1C MFR BLD HPLC: 6.9

## 2019-09-06 PROCEDURE — 99214 OFFICE O/P EST MOD 30 MIN: CPT | Mod: 25

## 2019-09-06 PROCEDURE — 83036 HEMOGLOBIN GLYCOSYLATED A1C: CPT | Mod: QW

## 2019-09-06 RX ORDER — LEVOTHYROXINE SODIUM 0.05 MG/1
50 TABLET ORAL
Qty: 30 | Refills: 0 | Status: DISCONTINUED | COMMUNITY
Start: 2019-04-01

## 2019-09-06 RX ORDER — METFORMIN HYDROCHLORIDE 850 MG/1
850 TABLET, COATED ORAL
Qty: 60 | Refills: 0 | Status: DISCONTINUED | COMMUNITY
Start: 2018-07-05

## 2019-09-06 NOTE — PHYSICAL EXAM

## 2019-09-06 NOTE — COUNSELING
[Healthy eating counseling provided] : healthy eating [Weight management counseling provided] : Weight management [Activity counseling provided] : activity [Weight Self Once Weekly] : Weight self once weekly [Low Fat Diet] : Low fat diet [Decrease Portions] : Decrease food portions [Low Salt Diet] : Low salt diet [Keep Food Diary] : Keep food diary [Fall prevention counseling provided] : Fall prevention counseling provided [Behavioral health counseling provided] : Behavioral health counseling provided [None] : None [Good understanding] : Patient has a good understanding of lifestyle changes and steps needed to achieve self management goal

## 2019-09-06 NOTE — ASSESSMENT
[FreeTextEntry1] : Better control DM:Dx 7/2018:  Education and counseling was given to pt with diet guidance. ,\par  HbA1c:6.9%\par -Continue metformin 1000mg BID.\par -Continue glimepiride 4mg daily.\par -Risks and benefits d/w pt.\par -F/up in 3 months.\par -Refused Dietitian and endocrinology referral. Never done\par -Ophthalmology referral.\par \par -Hypothyroidism: continue Levothyroxine 75 mcg daily\par Repeat TFT next visit\par \par GERD symptoms: not imporved with famotidine\par -H.Pylori\par -GI eval advise\par Right LQ pain:\par -USG done. Will request report\par \par \par \par CPE: 2018.\par \par Risks and benefits of meds compliance was d/w pt.

## 2019-09-06 NOTE — HISTORY OF PRESENT ILLNESS
[FreeTextEntry1] : DM f/up\par GERD symptoms [de-identified] : 45 y/o HF, new onset DM Dx 7/2018, presents today for f/up .\par Now compliant with meds. \par last HbA1c: 11.5% in 3/2019.\par Reports persistent GERD symptoms. Not eating spicy foods. Home made foods.\par \par

## 2019-09-26 LAB — H PYLORI AG STL QL: DETECTED

## 2019-10-01 ENCOUNTER — MED ADMIN CHARGE (OUTPATIENT)
Age: 44
End: 2019-10-01

## 2019-10-01 ENCOUNTER — APPOINTMENT (OUTPATIENT)
Dept: FAMILY MEDICINE | Facility: CLINIC | Age: 44
End: 2019-10-01
Payer: COMMERCIAL

## 2019-10-01 VITALS
OXYGEN SATURATION: 98 % | HEART RATE: 92 BPM | WEIGHT: 178 LBS | DIASTOLIC BLOOD PRESSURE: 92 MMHG | BODY MASS INDEX: 33.61 KG/M2 | HEIGHT: 61 IN | TEMPERATURE: 98.1 F | SYSTOLIC BLOOD PRESSURE: 157 MMHG

## 2019-10-01 PROCEDURE — 90471 IMMUNIZATION ADMIN: CPT

## 2019-10-01 PROCEDURE — 99213 OFFICE O/P EST LOW 20 MIN: CPT | Mod: 25

## 2019-10-01 PROCEDURE — 90674 CCIIV4 VAC NO PRSV 0.5 ML IM: CPT

## 2019-10-01 NOTE — HISTORY OF PRESENT ILLNESS
[FreeTextEntry1] : H.Pylori treatment\par Flu shot [de-identified] : 45 y/o HF, new onset DM Dx 7/2018, presents today for f/up  in abnormal test result.\par Now compliant with\par \par + Zaida, persistent GERD symptoms. Not eating spicy foods. Home made foods.\par \par

## 2019-10-01 NOTE — PHYSICAL EXAM

## 2019-10-01 NOTE — ASSESSMENT
[FreeTextEntry1] : H. Pylori infection:\par -Start amoxi-Clarithromycin and PPI x 10 days.\par -Urea breath test to be done 2 weeks after completed treatment.\par -Advise GI eval if symptoms persist after treatment.\par \par control DM:Dx 7/2018:  Education and counseling was given to pt with diet guidance. ,\par  HbA1c:6.9% (9/6/19)\par -Continue metformin 1000mg BID.\par -Continue glimepiride 4mg daily.\par -Risks and benefits d/w pt.\par -F/up in 3 months.\par -Refused Dietitian and endocrinology referral. Never done\par -Ophthalmology referral.\par \par -Hypothyroidism: continue Levothyroxine 75 mcg daily\par Repeat TFT next visit\par \par \par CPE: 06/2018.\par \par Risks and benefits of meds compliance was d/w pt.

## 2019-10-21 ENCOUNTER — APPOINTMENT (OUTPATIENT)
Dept: SURGERY | Facility: CLINIC | Age: 44
End: 2019-10-21

## 2019-11-04 LAB — UREA BREATH TEST QL: NEGATIVE

## 2019-12-04 ENCOUNTER — MEDICATION RENEWAL (OUTPATIENT)
Age: 44
End: 2019-12-04

## 2019-12-06 ENCOUNTER — APPOINTMENT (OUTPATIENT)
Dept: FAMILY MEDICINE | Facility: CLINIC | Age: 44
End: 2019-12-06
Payer: COMMERCIAL

## 2019-12-06 VITALS
WEIGHT: 174 LBS | HEART RATE: 79 BPM | TEMPERATURE: 98.2 F | DIASTOLIC BLOOD PRESSURE: 86 MMHG | HEIGHT: 61 IN | OXYGEN SATURATION: 99 % | SYSTOLIC BLOOD PRESSURE: 143 MMHG | BODY MASS INDEX: 32.85 KG/M2

## 2019-12-06 LAB — HBA1C MFR BLD HPLC: 7.4

## 2019-12-06 PROCEDURE — 99214 OFFICE O/P EST MOD 30 MIN: CPT | Mod: 25

## 2019-12-06 PROCEDURE — 83036 HEMOGLOBIN GLYCOSYLATED A1C: CPT | Mod: QW

## 2019-12-06 PROCEDURE — 36415 COLL VENOUS BLD VENIPUNCTURE: CPT

## 2019-12-06 RX ORDER — AMOXICILLIN 500 MG/1
500 CAPSULE ORAL TWICE DAILY
Qty: 40 | Refills: 0 | Status: DISCONTINUED | COMMUNITY
Start: 2019-10-01 | End: 2019-12-06

## 2019-12-06 RX ORDER — CLARITHROMYCIN 500 MG/1
500 TABLET, FILM COATED ORAL TWICE DAILY
Qty: 20 | Refills: 0 | Status: DISCONTINUED | COMMUNITY
Start: 2019-10-01 | End: 2019-12-06

## 2019-12-06 NOTE — ASSESSMENT
[FreeTextEntry1] : H. Pylori infection:\par -Completed treatment w/ amoxi-Clarithromycin and PPI x 10 days.\par -Urea breath test : negative.\par -GI eval: symptoms persist after treatment.\par \par control DM:Dx 7/2018:  Education and counseling was given to pt with diet guidance. ,\par  HbA1c:7.4\par -Continue metformin 1000mg BID.\par -Continue glimepiride 4mg daily.\par -Risks and benefits d/w pt.\par -F/up in 3 months.\par -Refused Dietitian and endocrinology referral. Never done\par -Ophthalmology eval done as per pt.\par -Microalbumin urine order\par \par -Hypothyroidism: continue Levothyroxine 75 mcg daily\par Repeat TFT \par \par Cyst in forehead:\par -Advise observation and monitor growing.\par \par CPE: 06/2018.\par \par Risks and benefits of meds compliance was d/w pt.

## 2019-12-06 NOTE — HISTORY OF PRESENT ILLNESS
[FreeTextEntry1] : DM f/up [de-identified] : 45 y/o HF, new onset DM Dx 7/2018, Hypothyroidism, + H.Pylori treated, presents today for f/up .\par Now compliant with\par \par + H.OPylori, persistent GERD symptoms. Not eating spicy foods. Home made foods.\par Concern about growing lump in left forehead.\par \par

## 2019-12-06 NOTE — PHYSICAL EXAM
[Well Nourished] : well nourished [No Acute Distress] : no acute distress [Well Developed] : well developed [Well-Appearing] : well-appearing [PERRL] : pupils equal round and reactive to light [Normal Sclera/Conjunctiva] : normal sclera/conjunctiva [Normal Oropharynx] : the oropharynx was normal [Normal Outer Ear/Nose] : the outer ears and nose were normal in appearance [EOMI] : extraocular movements intact [No Lymphadenopathy] : no lymphadenopathy [Supple] : supple [No JVD] : no jugular venous distention [Thyroid Normal, No Nodules] : the thyroid was normal and there were no nodules present [No Respiratory Distress] : no respiratory distress  [Clear to Auscultation] : lungs were clear to auscultation bilaterally [No Accessory Muscle Use] : no accessory muscle use [Normal Rate] : normal rate  [Regular Rhythm] : with a regular rhythm [Normal S1, S2] : normal S1 and S2 [No Murmur] : no murmur heard [No Carotid Bruits] : no carotid bruits [No Varicosities] : no varicosities [No Abdominal Bruit] : a ~M bruit was not heard ~T in the abdomen [Pedal Pulses Present] : the pedal pulses are present [No Edema] : there was no peripheral edema [No Palpable Aorta] : no palpable aorta [No Extremity Clubbing/Cyanosis] : no extremity clubbing/cyanosis [Soft] : abdomen soft [Non Tender] : non-tender [Non-distended] : non-distended [No Masses] : no abdominal mass palpated [Normal Bowel Sounds] : normal bowel sounds [No HSM] : no HSM [No CVA Tenderness] : no CVA  tenderness [Normal Posterior Cervical Nodes] : no posterior cervical lymphadenopathy [Normal Anterior Cervical Nodes] : no anterior cervical lymphadenopathy [No Joint Swelling] : no joint swelling [No Spinal Tenderness] : no spinal tenderness [Grossly Normal Strength/Tone] : grossly normal strength/tone [No Rash] : no rash [No Focal Deficits] : no focal deficits [Coordination Grossly Intact] : coordination grossly intact [Normal Gait] : normal gait [Normal Insight/Judgement] : insight and judgment were intact [Normal Affect] : the affect was normal [Deep Tendon Reflexes (DTR)] : deep tendon reflexes were 2+ and symmetric [Skin Lesions 1] : Skin lesion: [Single ___ mm] : a single [unfilled] ~Umm [Location: ___] : [unfilled] [Lesions Forehead] : on the left forehead

## 2019-12-09 LAB
BASOPHILS # BLD AUTO: 0.06 K/UL
BASOPHILS NFR BLD AUTO: 0.7 %
CHOLEST SERPL-MCNC: 159 MG/DL
CHOLEST/HDLC SERPL: 4.7 RATIO
CREAT SPEC-SCNC: 40 MG/DL
EOSINOPHIL # BLD AUTO: 0.22 K/UL
EOSINOPHIL NFR BLD AUTO: 2.5 %
HCT VFR BLD CALC: 42.9 %
HDLC SERPL-MCNC: 34 MG/DL
HGB BLD-MCNC: 14.2 G/DL
IMM GRANULOCYTES NFR BLD AUTO: 0.5 %
LDLC SERPL CALC-MCNC: 100 MG/DL
LYMPHOCYTES # BLD AUTO: 2.78 K/UL
LYMPHOCYTES NFR BLD AUTO: 32.1 %
MAN DIFF?: NORMAL
MCHC RBC-ENTMCNC: 27.7 PG
MCHC RBC-ENTMCNC: 33.1 GM/DL
MCV RBC AUTO: 83.6 FL
MICROALBUMIN 24H UR DL<=1MG/L-MCNC: <1.2 MG/DL
MICROALBUMIN/CREAT 24H UR-RTO: NORMAL MG/G
MONOCYTES # BLD AUTO: 0.65 K/UL
MONOCYTES NFR BLD AUTO: 7.5 %
NEUTROPHILS # BLD AUTO: 4.9 K/UL
NEUTROPHILS NFR BLD AUTO: 56.7 %
PLATELET # BLD AUTO: 325 K/UL
RBC # BLD: 5.13 M/UL
RBC # FLD: 13.3 %
TRIGL SERPL-MCNC: 123 MG/DL
TSH SERPL-ACNC: 0.57 UIU/ML
WBC # FLD AUTO: 8.65 K/UL

## 2019-12-18 ENCOUNTER — APPOINTMENT (OUTPATIENT)
Dept: FAMILY MEDICINE | Facility: CLINIC | Age: 44
End: 2019-12-18
Payer: COMMERCIAL

## 2019-12-18 VITALS
WEIGHT: 174 LBS | SYSTOLIC BLOOD PRESSURE: 135 MMHG | HEART RATE: 89 BPM | BODY MASS INDEX: 32.85 KG/M2 | DIASTOLIC BLOOD PRESSURE: 74 MMHG | HEIGHT: 61 IN | OXYGEN SATURATION: 100 % | TEMPERATURE: 98.4 F

## 2019-12-18 PROCEDURE — 99213 OFFICE O/P EST LOW 20 MIN: CPT

## 2019-12-18 NOTE — PHYSICAL EXAM
[No Acute Distress] : no acute distress [Well Nourished] : well nourished [Well-Appearing] : well-appearing [Well Developed] : well developed [Normal Sclera/Conjunctiva] : normal sclera/conjunctiva [PERRL] : pupils equal round and reactive to light [EOMI] : extraocular movements intact [Normal Outer Ear/Nose] : the outer ears and nose were normal in appearance [Normal Oropharynx] : the oropharynx was normal [No JVD] : no jugular venous distention [Supple] : supple [No Lymphadenopathy] : no lymphadenopathy [Thyroid Normal, No Nodules] : the thyroid was normal and there were no nodules present [No Accessory Muscle Use] : no accessory muscle use [Clear to Auscultation] : lungs were clear to auscultation bilaterally [No Respiratory Distress] : no respiratory distress  [Normal Rate] : normal rate  [Regular Rhythm] : with a regular rhythm [No Murmur] : no murmur heard [Normal S1, S2] : normal S1 and S2 [No Carotid Bruits] : no carotid bruits [No Abdominal Bruit] : a ~M bruit was not heard ~T in the abdomen [No Varicosities] : no varicosities [No Edema] : there was no peripheral edema [Pedal Pulses Present] : the pedal pulses are present [No Extremity Clubbing/Cyanosis] : no extremity clubbing/cyanosis [No Palpable Aorta] : no palpable aorta [Soft] : abdomen soft [Non Tender] : non-tender [Non-distended] : non-distended [No Masses] : no abdominal mass palpated [No HSM] : no HSM [Normal Posterior Cervical Nodes] : no posterior cervical lymphadenopathy [Normal Bowel Sounds] : normal bowel sounds [No CVA Tenderness] : no CVA  tenderness [Normal Anterior Cervical Nodes] : no anterior cervical lymphadenopathy [No Spinal Tenderness] : no spinal tenderness [No Joint Swelling] : no joint swelling [No Rash] : no rash [Grossly Normal Strength/Tone] : grossly normal strength/tone [Location: ___] : [unfilled] [Single ___ mm] : a single [unfilled] ~Umm [Skin Lesions 1] : Skin lesion: [Coordination Grossly Intact] : coordination grossly intact [Lesions Forehead] : on the left forehead [Normal Gait] : normal gait [No Focal Deficits] : no focal deficits [Normal Affect] : the affect was normal [Deep Tendon Reflexes (DTR)] : deep tendon reflexes were 2+ and symmetric [Normal Insight/Judgement] : insight and judgment were intact

## 2019-12-18 NOTE — HISTORY OF PRESENT ILLNESS
[FreeTextEntry1] : Lump in forehead [de-identified] : Pt presents today for evaluation of persistent lump in left forehead for aprox 2 years. Pt notice recent growing and burning in the area. Concern. denies any trauma in the area, No discharge.

## 2019-12-18 NOTE — ASSESSMENT
[FreeTextEntry1] : H. Pylori infection:\par -Completed treatment w/ amoxi-Clarithromycin and PPI x 10 days.\par -Urea breath test : negative.\par -GI eval: symptoms persist after treatment.\par \par control DM:Dx 7/2018:  Education and counseling was given to pt with diet guidance. ,\par  HbA1c:7.4\par -Continue metformin 1000mg BID.\par -Continue glimepiride 4mg daily.\par -Risks and benefits d/w pt.\par -F/up in 3 months.\par -Refused Dietitian and endocrinology referral. Never done\par -Ophthalmology eval done as per pt.\par -Microalbumin urine order\par \par -Hypothyroidism: continue Levothyroxine 75 mcg daily\par Repeat TFT \par \par Skin lesion/ Cyst in forehead:\par -Start Bacitracin.\par -Dermatology referral.\par \par CPE: 06/2018.\par \par Risks and benefits of meds compliance was d/w pt.

## 2019-12-26 ENCOUNTER — APPOINTMENT (OUTPATIENT)
Dept: BREAST CENTER | Facility: CLINIC | Age: 44
End: 2019-12-26
Payer: COMMERCIAL

## 2019-12-26 VITALS
SYSTOLIC BLOOD PRESSURE: 134 MMHG | TEMPERATURE: 98.3 F | HEART RATE: 78 BPM | WEIGHT: 174 LBS | HEIGHT: 61 IN | DIASTOLIC BLOOD PRESSURE: 91 MMHG | OXYGEN SATURATION: 99 % | BODY MASS INDEX: 32.85 KG/M2

## 2019-12-26 PROCEDURE — 99215 OFFICE O/P EST HI 40 MIN: CPT

## 2019-12-26 NOTE — HISTORY OF PRESENT ILLNESS
[FreeTextEntry1] : I had the pleasure of seeing Aurelia Guajardo in the office for a follow up visit secondary to fibroadenomatous and chronic right breast pain. \par \par Aurelia is a george 43 yo premenopausal female who was initially seen secondary to complaint of right breast pain and "lump" and on clinical examination demonstrated to have a well circumscribed 1 cm nodule of the right breast.  She then underwent core needle biopsy under ultrasound guidance. Upon review of bilateral breast imaging, the recommendation was made for bilateral breast ultrasound guided core biopsy of a well circumscribed nodule in both right and left breast.  Biopsy demonstrated a right breast 12:00 Fibroadenoma and left breast 7:00 Fibroadenoma.\par \par Today she denies new dominant breast mass but states that she has persistent discomfort at the site of the biopsy proven right breast fibroadenoma at the 12:00 position. Of note, she has evidence of bilateral breast fibroadenoma after confirming with core needle biopsy under ultrasound guidance.\par \par Imaging: [facility]\par 1/29/2018:  US BREAST COMPLETE BI/ MG MAMMO SCREEN W PARUL\par Impression:  No mammographic evidence of malignancy.  No sonographic evidence of malignancy.  Multiple bilateral benign sonographic findings as described above.  Recommend follow up ultrasound in one year at time of patients bilateral mammogram.  Recommendation:  Mammography and ultrasound in 1 year.  BiRADS 2- Benign Findings.\par \par Pathology 1/26/2017 \par Right breast 9mm nodule at the 12:00 position: Fibroadenoma without atypia\par Left breast 1cm nodule at the 7:00 position: Fibroadenoma without atypia\par \par Today she denies new dominant breast mass but states that she has persistent discomfort at the site of the biopsy proven right breast fibroadenoma at the 12:00 position. Skin dermatitis was noted on the left areola today.  She denied any change to lotion, laundry detergent, and trauma to breast.  She is scheduled to see dermatology for lump on forehead and will also show dermatology for possible steroid cream.  She is to call the office if the skin is to change in appearance and apply lotion to the area.\par \par She understands that the recommendation at this time is for continued observation and bilateral screening mammogram annually. She has multiple nodule/cyst seen on imaging and recommendation for continued observation.  She still continues to complain about pain and was given a script for naproxen 500mg BID PRN and evening primrose oil. Follow up clinical breast exam in 6 months unless anything is to change and screening imaging in March 2020.\par \par She understands and all questions were answered.

## 2019-12-26 NOTE — PHYSICAL EXAM
[Normocephalic] : normocephalic [Atraumatic] : atraumatic [PERRL] : pupils equal, round and reactive to light [Supple] : supple [Sclera nonicteric] : sclera nonicteric [No Supraclavicular Adenopathy] : no supraclavicular adenopathy [No dominant masses] : no dominant masses left breast [Examined in the supine and seated position] : examined in the supine and seated position [No Nipple Retraction] : no left nipple retraction [No Nipple Discharge] : no left nipple discharge [Breast Mass Left Breast ___cm] : no masses [Breast Nipple Inversion] : nipples not inverted [Breast Nipple Retraction] : nipples not retracted [Breast Nipple Fissures] : nipples not fissured [Breast Nipple Flattening] : nipples not flattened [Breast Abnormal Secretion Bloody Fluid] : no bloody discharge [Breast Abnormal Secretion Serous Fluid] : no serous discharge [Breast Abnormal Lactation (Galactorrhea)] : no galactorrhea [Breast Abnormal Secretion Opalescent Fluid] : no milky discharge [No Axillary Lymphadenopathy] : no left axillary lymphadenopathy [No Edema] : no edema [No Rashes] : no rashes [No Ulceration] : no ulceration [de-identified] : 12:00-12:30 position there is a 1cm palpable well circumscribed mass which is mobile and unattached to the chest wall.  No other dominant masses.  Everted nipple without discharge.  No skin changes. [de-identified] : No dominant masses, normal to palpation. Everted nipple without discharge. No skin changes.

## 2019-12-26 NOTE — ASSESSMENT
[FreeTextEntry1] : 45 yo premenopausal female with fibroadenomatosis presents for clinical breast exam and review of recent mammogram and sonogram.  She has right breast 0.9 cm fibroadenoma which is stable in size and left breast 0.7 cm fibroadenoma which has decreased in size.  She has multiple nodules/cysts in both the left and right breast and recommendation at this time is for continued observation.  Annual mammogram and sonogram March 2020 and clinical breast exam in 6 months( 9/2018).  Naproxen and evening primrose oil was ordered and sent to pharmacy for breast pain.  She is to apply lotion to left areola for irritation and is scheduled to see dermatology.\par 1. CBE 6/2020\par 2. Annual mammogram and sonogram 3/2020\par 3.  Consult with dermatology\par 4.  Evening primrose oil\par 5.  Naproxen 500 mg PRN\par

## 2020-01-03 ENCOUNTER — APPOINTMENT (OUTPATIENT)
Dept: GASTROENTEROLOGY | Facility: CLINIC | Age: 45
End: 2020-01-03
Payer: COMMERCIAL

## 2020-01-03 VITALS
WEIGHT: 182 LBS | HEIGHT: 61 IN | SYSTOLIC BLOOD PRESSURE: 130 MMHG | BODY MASS INDEX: 34.36 KG/M2 | OXYGEN SATURATION: 100 % | RESPIRATION RATE: 16 BRPM | HEART RATE: 80 BPM | DIASTOLIC BLOOD PRESSURE: 84 MMHG

## 2020-01-03 PROCEDURE — 99204 OFFICE O/P NEW MOD 45 MIN: CPT

## 2020-01-03 NOTE — PHYSICAL EXAM
[General Appearance - Alert] : alert [General Appearance - In No Acute Distress] : in no acute distress [General Appearance - Well Nourished] : well nourished [General Appearance - Well Developed] : well developed [General Appearance - Well-Appearing] : healthy appearing [Sclera] : the sclera and conjunctiva were normal [PERRL With Normal Accommodation] : pupils were equal in size, round, and reactive to light [Extraocular Movements] : extraocular movements were intact [Outer Ear] : the ears and nose were normal in appearance [Hearing Threshold Finger Rub Not [place]] : hearing was normal [Oropharynx] : the oropharynx was normal [Examination Of The Oral Cavity] : the lips and gums were normal [Neck Appearance] : the appearance of the neck was normal [Auscultation Breath Sounds / Voice Sounds] : lungs were clear to auscultation bilaterally [Heart Rate And Rhythm] : heart rate was normal and rhythm regular [Heart Sounds Gallop] : no gallops [Murmurs] : no murmurs [Heart Sounds] : normal S1 and S2 [Heart Sounds Pericardial Friction Rub] : no pericardial rub [Bowel Sounds] : normal bowel sounds [Abdomen Soft] : soft [No CVA Tenderness] : no ~M costovertebral angle tenderness [Abdomen Tenderness] : non-tender [Abdomen Mass (___ Cm)] : no abdominal mass palpated [Nail Clubbing] : no clubbing  or cyanosis of the fingernails [Abnormal Walk] : normal gait [No Spinal Tenderness] : no spinal tenderness [Skin Color & Pigmentation] : normal skin color and pigmentation [Motor Tone] : muscle strength and tone were normal [Musculoskeletal - Swelling] : no joint swelling seen [Skin Turgor] : normal skin turgor [No Focal Deficits] : no focal deficits [Motor Exam] : the motor exam was normal [] : no rash [Impaired Insight] : insight and judgment were intact [Oriented To Time, Place, And Person] : oriented to person, place, and time [Affect] : the affect was normal [FreeTextEntry1] : obese

## 2020-01-03 NOTE — HISTORY OF PRESENT ILLNESS
[Nausea] : denies nausea [Vomiting] : denies vomiting [Diarrhea] : denies diarrhea [Yellow Skin Or Eyes (Jaundice)] : denies jaundice [Abdominal Swelling] : denies abdominal swelling [Rectal Pain] : denies rectal pain [Heartburn] : heartburn [Abdominal Pain] : abdominal pain [de-identified] : 3 months ago she began to experience recurrent postprandial epigastric pain described as burning in nature it radiates to the chest consistent with heartburn. A stool for H. pylori antigen was positive and she was treated with triple therapy after which a urea breath test was negative. She was treated with omeprazole 40 mg p.o. q.a.m. for one month with little if any effect after which the medication was discontinued. She has been taking over-the-counter H2 blockers did appear to be somewhat effective but only for a very short period of time. There is no dysphagia. Her appetite and weight are stable. There is no diarrhea, constipation, rectal bleeding or melena.

## 2020-01-03 NOTE — ASSESSMENT
[FreeTextEntry1] : Patient symptoms are most consistent with gastroesophageal reflux probably exacerbated by her underlying obesity. It appears that the H. pylori has been eradicated the bacteria would not have been responsible for acid reflux anyway. Underlying reflux esophagitis or Noriega's esophagus should be excluded. She'll obtain a CBC, basic metabolic profile, liver function tests and celiac antibodies. She will be scheduled for upper endoscopy. The risks, benefits, complications and possible adverse consequences associated with upper endoscopy were discussed with the patient.\par

## 2020-02-13 ENCOUNTER — APPOINTMENT (OUTPATIENT)
Dept: FAMILY MEDICINE | Facility: CLINIC | Age: 45
End: 2020-02-13
Payer: COMMERCIAL

## 2020-02-13 ENCOUNTER — RESULT CHARGE (OUTPATIENT)
Age: 45
End: 2020-02-13

## 2020-02-13 VITALS
HEART RATE: 78 BPM | DIASTOLIC BLOOD PRESSURE: 98 MMHG | TEMPERATURE: 98.3 F | BODY MASS INDEX: 33.23 KG/M2 | WEIGHT: 176 LBS | HEIGHT: 61 IN | SYSTOLIC BLOOD PRESSURE: 149 MMHG | OXYGEN SATURATION: 97 %

## 2020-02-13 LAB
CYTOLOGY CVX/VAG DOC THIN PREP: NORMAL
HBA1C MFR BLD HPLC: 7.5

## 2020-02-13 PROCEDURE — 36415 COLL VENOUS BLD VENIPUNCTURE: CPT

## 2020-02-13 PROCEDURE — 83036 HEMOGLOBIN GLYCOSYLATED A1C: CPT | Mod: QW

## 2020-02-13 PROCEDURE — 99396 PREV VISIT EST AGE 40-64: CPT | Mod: 25

## 2020-02-13 NOTE — HEALTH RISK ASSESSMENT
[Good] : ~his/her~  mood as  good [No] : In the past 12 months have you used drugs other than those required for medical reasons? No [No falls in past year] : Patient reported no falls in the past year [0] : 2) Feeling down, depressed, or hopeless: Not at all (0) [Patient reported mammogram was normal] : Patient reported mammogram was normal [Patient reported PAP Smear was normal] : Patient reported PAP Smear was normal [HIV test declined] : HIV test declined [None] : None [With Family] : lives with family [Employed] : employed [] :  [# Of Children ___] : has [unfilled] children [Sexually Active] : sexually active [Feels Safe at Home] : Feels safe at home [Fully functional (using the telephone, shopping, preparing meals, housekeeping, doing laundry, using] : Fully functional and needs no help or supervision to perform IADLs (using the telephone, shopping, preparing meals, housekeeping, doing laundry, using transportation, managing medications and managing finances) [Fully functional (bathing, dressing, toileting, transferring, walking, feeding)] : Fully functional (bathing, dressing, toileting, transferring, walking, feeding) [Smoke Detector] : smoke detector [Seat Belt] :  uses seat belt [Reviewed no changes] : Reviewed no changes [No Retinopathy] : No retinopathy [FreeTextEntry1] : stress due to court [] : No [de-identified] : no [de-identified] : no sugar/ low cabs [EyeExamDate] : 2019 [Change in mental status noted] : No change in mental status noted [Language] : denies difficulty with language [Reports changes in hearing] : Reports no changes in hearing [Reports changes in vision] : Reports no changes in vision [Reports changes in dental health] : Reports no changes in dental health [MammogramDate] : 03/19 [PapSmearDate] : 2017 [HIVDate] : 2018 [HIVComments] : negative [HepatitisCComments] : 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

## 2020-02-13 NOTE — HISTORY OF PRESENT ILLNESS
[FreeTextEntry1] : Annual physical [de-identified] : 43 y/o HF, w/ hx Breast Fibroadenoma, GERD presents today for annual physical.\par \par reports to feel better of GERd symptoms since started on famotidine.\par Seen by GI. Schedule for EGD

## 2020-02-13 NOTE — PHYSICAL EXAM
[No Acute Distress] : no acute distress [Well Nourished] : well nourished [Well Developed] : well developed [Well-Appearing] : well-appearing [Normal Sclera/Conjunctiva] : normal sclera/conjunctiva [EOMI] : extraocular movements intact [PERRL] : pupils equal round and reactive to light [Normal Outer Ear/Nose] : the outer ears and nose were normal in appearance [Normal Oropharynx] : the oropharynx was normal [No JVD] : no jugular venous distention [Supple] : supple [No Lymphadenopathy] : no lymphadenopathy [Thyroid Normal, No Nodules] : the thyroid was normal and there were no nodules present [Clear to Auscultation] : lungs were clear to auscultation bilaterally [No Respiratory Distress] : no respiratory distress  [No Accessory Muscle Use] : no accessory muscle use [Normal Rate] : normal rate  [Regular Rhythm] : with a regular rhythm [Normal S1, S2] : normal S1 and S2 [No Murmur] : no murmur heard [No Carotid Bruits] : no carotid bruits [No Abdominal Bruit] : a ~M bruit was not heard ~T in the abdomen [No Varicosities] : no varicosities [No Edema] : there was no peripheral edema [Pedal Pulses Present] : the pedal pulses are present [No Extremity Clubbing/Cyanosis] : no extremity clubbing/cyanosis [No Palpable Aorta] : no palpable aorta [Soft] : abdomen soft [Non Tender] : non-tender [Non-distended] : non-distended [No HSM] : no HSM [No Masses] : no abdominal mass palpated [Normal Posterior Cervical Nodes] : no posterior cervical lymphadenopathy [Normal Bowel Sounds] : normal bowel sounds [No CVA Tenderness] : no CVA  tenderness [Normal Anterior Cervical Nodes] : no anterior cervical lymphadenopathy [No Spinal Tenderness] : no spinal tenderness [No Joint Swelling] : no joint swelling [Grossly Normal Strength/Tone] : grossly normal strength/tone [Normal Gait] : normal gait [No Rash] : no rash [No Focal Deficits] : no focal deficits [Coordination Grossly Intact] : coordination grossly intact [Deep Tendon Reflexes (DTR)] : deep tendon reflexes were 2+ and symmetric [Normal Affect] : the affect was normal [Normal Insight/Judgement] : insight and judgment were intact

## 2020-02-13 NOTE — ASSESSMENT
[FreeTextEntry1] : CPE: blood and UA today\par \par HIV: screening: negative 2018\par \par Mammo: up to date. w/ Dr Coon\par \par Hypothyroidism: On levothyroxine. TSH today.\par \par GERD: doing better. \par On omeprazole.\par Schedule for EGD with Dr Shah.\par \par  DM:Dx 7/2018: Education and counseling was given to pt with diet guidance. ,\par  HbA1c:7.5\par -Continue metformin 1000mg BID.\par -Continue glimepiride 4mg daily.\par -Start Jnauvia\par -Risks and benefits d/w pt.\par -F/up in 3 months.\par -Refused Dietitian and endocrinology referral. Never done\par -Ophthalmology eval done as per pt.\par -Microalbumin urine order\par \par HTN:\par start Lisinopril 5 mg\par

## 2020-02-13 NOTE — PAST MEDICAL HISTORY
[Menstruating] : menstruating [Definite ___ (Date)] : the last menstrual period was [unfilled] [Living ___] : Living: [unfilled] [Total Preg ___] : G[unfilled]

## 2020-02-13 NOTE — COUNSELING
[Weight management counseling provided] : Weight management [Activity counseling provided] : activity [Healthy eating counseling provided] : healthy eating [Decrease Portions] : Decrease food portions [Target Wt Loss Goal ___] : Target weight loss goal [unfilled] lbs [Walking] : Walking [Fall prevention counseling provided] : Fall prevention counseling provided [Behavioral health counseling provided] : Behavioral health counseling provided [None] : None [Good understanding] : Patient has a good understanding of lifestyle changes and steps needed to achieve self management goal

## 2020-02-18 ENCOUNTER — APPOINTMENT (OUTPATIENT)
Dept: DERMATOLOGY | Facility: CLINIC | Age: 45
End: 2020-02-18
Payer: COMMERCIAL

## 2020-02-18 LAB
25(OH)D3 SERPL-MCNC: 19.1 NG/ML
ALBUMIN SERPL ELPH-MCNC: 4.4 G/DL
ALP BLD-CCNC: 56 U/L
ALT SERPL-CCNC: 35 U/L
ANION GAP SERPL CALC-SCNC: 17 MMOL/L
AST SERPL-CCNC: 22 U/L
BASOPHILS # BLD AUTO: 0.07 K/UL
BASOPHILS NFR BLD AUTO: 0.7 %
BILIRUB SERPL-MCNC: 0.4 MG/DL
BUN SERPL-MCNC: 9 MG/DL
CALCIUM SERPL-MCNC: 9.2 MG/DL
CHLORIDE SERPL-SCNC: 102 MMOL/L
CHOLEST SERPL-MCNC: 173 MG/DL
CHOLEST/HDLC SERPL: 4.3 RATIO
CO2 SERPL-SCNC: 21 MMOL/L
CREAT SERPL-MCNC: 0.68 MG/DL
CREAT SPEC-SCNC: 57 MG/DL
ENDOMYSIUM IGA SER QL: NEGATIVE
ENDOMYSIUM IGA TITR SER: NORMAL
EOSINOPHIL # BLD AUTO: 0.22 K/UL
EOSINOPHIL NFR BLD AUTO: 2.2 %
GLIADIN IGA SER QL: <5 UNITS
GLIADIN IGG SER QL: <5 UNITS
GLIADIN PEPTIDE IGA SER-ACNC: NEGATIVE
GLIADIN PEPTIDE IGG SER-ACNC: NEGATIVE
GLUCOSE SERPL-MCNC: 111 MG/DL
HCT VFR BLD CALC: 43.1 %
HDLC SERPL-MCNC: 40 MG/DL
HGB BLD-MCNC: 13.8 G/DL
IGA SER QL IEP: 119 MG/DL
IMM GRANULOCYTES NFR BLD AUTO: 0.3 %
INR PPP: 0.93 RATIO
LDLC SERPL CALC-MCNC: 106 MG/DL
LYMPHOCYTES # BLD AUTO: 3.73 K/UL
LYMPHOCYTES NFR BLD AUTO: 36.5 %
MAN DIFF?: NORMAL
MCHC RBC-ENTMCNC: 26.7 PG
MCHC RBC-ENTMCNC: 32 GM/DL
MCV RBC AUTO: 83.5 FL
MICROALBUMIN 24H UR DL<=1MG/L-MCNC: <1.2 MG/DL
MICROALBUMIN/CREAT 24H UR-RTO: NORMAL MG/G
MONOCYTES # BLD AUTO: 0.75 K/UL
MONOCYTES NFR BLD AUTO: 7.3 %
NEUTROPHILS # BLD AUTO: 5.43 K/UL
NEUTROPHILS NFR BLD AUTO: 53 %
PLATELET # BLD AUTO: 314 K/UL
POTASSIUM SERPL-SCNC: 3.8 MMOL/L
PROT SERPL-MCNC: 6.7 G/DL
PT BLD: 10.6 SEC
RBC # BLD: 5.16 M/UL
RBC # FLD: 13.4 %
SODIUM SERPL-SCNC: 140 MMOL/L
TRIGL SERPL-MCNC: 132 MG/DL
TSH SERPL-ACNC: 2.86 UIU/ML
TTG IGA SER IA-ACNC: <1.2 U/ML
TTG IGA SER-ACNC: NEGATIVE
TTG IGG SER IA-ACNC: 1.9 U/ML
TTG IGG SER IA-ACNC: NEGATIVE
WBC # FLD AUTO: 10.23 K/UL

## 2020-02-18 PROCEDURE — 99201 OFFICE OUTPATIENT NEW 10 MINUTES: CPT

## 2020-02-25 ENCOUNTER — OUTPATIENT (OUTPATIENT)
Dept: OUTPATIENT SERVICES | Facility: HOSPITAL | Age: 45
LOS: 1 days | End: 2020-02-25
Payer: COMMERCIAL

## 2020-02-25 ENCOUNTER — RESULT REVIEW (OUTPATIENT)
Age: 45
End: 2020-02-25

## 2020-02-25 ENCOUNTER — APPOINTMENT (OUTPATIENT)
Dept: GASTROENTEROLOGY | Facility: GI CENTER | Age: 45
End: 2020-02-25
Payer: COMMERCIAL

## 2020-02-25 DIAGNOSIS — K21.9 GASTRO-ESOPHAGEAL REFLUX DISEASE WITHOUT ESOPHAGITIS: ICD-10-CM

## 2020-02-25 LAB — GLUCOSE BLDC GLUCOMTR-MCNC: 165 MG/DL — HIGH (ref 70–99)

## 2020-02-25 PROCEDURE — 43239 EGD BIOPSY SINGLE/MULTIPLE: CPT

## 2020-02-25 PROCEDURE — 88342 IMHCHEM/IMCYTCHM 1ST ANTB: CPT

## 2020-02-25 PROCEDURE — 88342 IMHCHEM/IMCYTCHM 1ST ANTB: CPT | Mod: 26

## 2020-02-25 PROCEDURE — 82962 GLUCOSE BLOOD TEST: CPT

## 2020-02-25 PROCEDURE — 88305 TISSUE EXAM BY PATHOLOGIST: CPT

## 2020-02-25 PROCEDURE — 88305 TISSUE EXAM BY PATHOLOGIST: CPT | Mod: 26

## 2020-02-25 NOTE — PHYSICAL EXAM
[General Appearance - Well Nourished] : well nourished [General Appearance - In No Acute Distress] : in no acute distress [General Appearance - Alert] : alert [General Appearance - Well Developed] : well developed [General Appearance - Well-Appearing] : healthy appearing [FreeTextEntry1] : obese [Sclera] : the sclera and conjunctiva were normal [PERRL With Normal Accommodation] : pupils were equal in size, round, and reactive to light [Extraocular Movements] : extraocular movements were intact [Outer Ear] : the ears and nose were normal in appearance [Hearing Threshold Finger Rub Not [place]] : hearing was normal [Examination Of The Oral Cavity] : the lips and gums were normal [Neck Appearance] : the appearance of the neck was normal [Oropharynx] : the oropharynx was normal [Auscultation Breath Sounds / Voice Sounds] : lungs were clear to auscultation bilaterally [Heart Sounds] : normal S1 and S2 [Heart Sounds Gallop] : no gallops [Heart Rate And Rhythm] : heart rate was normal and rhythm regular [Heart Sounds Pericardial Friction Rub] : no pericardial rub [Murmurs] : no murmurs [Abdomen Tenderness] : non-tender [Bowel Sounds] : normal bowel sounds [Abdomen Soft] : soft [No CVA Tenderness] : no ~M costovertebral angle tenderness [Abdomen Mass (___ Cm)] : no abdominal mass palpated [No Spinal Tenderness] : no spinal tenderness [Nail Clubbing] : no clubbing  or cyanosis of the fingernails [Musculoskeletal - Swelling] : no joint swelling seen [Abnormal Walk] : normal gait [Skin Color & Pigmentation] : normal skin color and pigmentation [Motor Tone] : muscle strength and tone were normal [] : no rash [Skin Turgor] : normal skin turgor [No Focal Deficits] : no focal deficits [Impaired Insight] : insight and judgment were intact [Motor Exam] : the motor exam was normal [Oriented To Time, Place, And Person] : oriented to person, place, and time [Affect] : the affect was normal

## 2020-02-25 NOTE — PROCEDURE
[Epigastric Pain] : epigastric pain [With Biopsy] : with biopsy [GERD] : GERD [Procedure Explained] : The procedure was explained [Allergies Reviewed] : allergies reviewed. [Risks] : Risks [Benefits] : benefits [Alternatives] : alternatives [Consent Obtained] : written consent was obtained prior to the procedure and is detailed in the patient's record [Patient] : the patient [Automated Blood Pressure Cuff] : automated blood pressure cuff [Pulse Oximeter] : pulse oximeter [Cardiac Monitor] : cardiac monitor [Propofol ___ mg IV] : Propofol [unfilled] ~Umg intravenously [2] : 2 [Erosive Esophagitis] : erosive esophagitis - [LA Class A] : LA Class A [Hiatal Hernia] : hiatal hernia [Biopsy] : biopsy [Erythema] : erythema [Duodenitis] : duodenitis [Erosion] : erosion [Normal] : Normal [Sent to Pathology] : was sent to pathology for analysis [Tolerated Well] : the patient tolerated the procedure well [Vital Signs Stable] : the vital signs were stable [de-identified] : [id number] [de-identified] : small hiatal hernia present [de-identified] : biopsy performed for H Pylori [de-identified] : biopsy performed for H Pylori [de-identified] : She will continue omeprazole 40mg PO qAMa dn add famotidine 40mg PO every evening

## 2020-02-25 NOTE — PROCEDURE
[Epigastric Pain] : epigastric pain [With Biopsy] : with biopsy [GERD] : GERD [Procedure Explained] : The procedure was explained [Allergies Reviewed] : allergies reviewed. [Risks] : Risks [Benefits] : benefits [Alternatives] : alternatives [Consent Obtained] : written consent was obtained prior to the procedure and is detailed in the patient's record [Patient] : the patient [Automated Blood Pressure Cuff] : automated blood pressure cuff [Pulse Oximeter] : pulse oximeter [Cardiac Monitor] : cardiac monitor [Propofol ___ mg IV] : Propofol [unfilled] ~Umg intravenously [2] : 2 [Erosive Esophagitis] : erosive esophagitis - [LA Class A] : LA Class A [Hiatal Hernia] : hiatal hernia [Biopsy] : biopsy [Duodenitis] : duodenitis [Erythema] : erythema [Erosion] : erosion [Normal] : Normal [Sent to Pathology] : was sent to pathology for analysis [Tolerated Well] : the patient tolerated the procedure well [Vital Signs Stable] : the vital signs were stable [de-identified] : [id number] [de-identified] : small hiatal hernia present [de-identified] : biopsy performed for H Pylori [de-identified] : biopsy performed for H Pylori [de-identified] : She will continue omeprazole 40mg PO qAMa dn add famotidine 40mg PO every evening

## 2020-02-25 NOTE — PHYSICAL EXAM
[General Appearance - In No Acute Distress] : in no acute distress [General Appearance - Well Nourished] : well nourished [General Appearance - Alert] : alert [General Appearance - Well-Appearing] : healthy appearing [General Appearance - Well Developed] : well developed [FreeTextEntry1] : obese [Sclera] : the sclera and conjunctiva were normal [PERRL With Normal Accommodation] : pupils were equal in size, round, and reactive to light [Extraocular Movements] : extraocular movements were intact [Outer Ear] : the ears and nose were normal in appearance [Hearing Threshold Finger Rub Not [place]] : hearing was normal [Neck Appearance] : the appearance of the neck was normal [Oropharynx] : the oropharynx was normal [Examination Of The Oral Cavity] : the lips and gums were normal [Auscultation Breath Sounds / Voice Sounds] : lungs were clear to auscultation bilaterally [Heart Rate And Rhythm] : heart rate was normal and rhythm regular [Heart Sounds Gallop] : no gallops [Heart Sounds] : normal S1 and S2 [Heart Sounds Pericardial Friction Rub] : no pericardial rub [Murmurs] : no murmurs [Abdomen Soft] : soft [Bowel Sounds] : normal bowel sounds [Abdomen Tenderness] : non-tender [Abdomen Mass (___ Cm)] : no abdominal mass palpated [No CVA Tenderness] : no ~M costovertebral angle tenderness [No Spinal Tenderness] : no spinal tenderness [Nail Clubbing] : no clubbing  or cyanosis of the fingernails [Musculoskeletal - Swelling] : no joint swelling seen [Abnormal Walk] : normal gait [Motor Tone] : muscle strength and tone were normal [Skin Color & Pigmentation] : normal skin color and pigmentation [] : no rash [Skin Turgor] : normal skin turgor [No Focal Deficits] : no focal deficits [Impaired Insight] : insight and judgment were intact [Oriented To Time, Place, And Person] : oriented to person, place, and time [Motor Exam] : the motor exam was normal [Affect] : the affect was normal

## 2020-02-28 LAB — SURGICAL PATHOLOGY STUDY: SIGNIFICANT CHANGE UP

## 2020-03-16 ENCOUNTER — RESULT REVIEW (OUTPATIENT)
Age: 45
End: 2020-03-16

## 2020-03-16 ENCOUNTER — APPOINTMENT (OUTPATIENT)
Dept: MAMMOGRAPHY | Facility: CLINIC | Age: 45
End: 2020-03-16
Payer: COMMERCIAL

## 2020-03-16 ENCOUNTER — APPOINTMENT (OUTPATIENT)
Dept: ULTRASOUND IMAGING | Facility: CLINIC | Age: 45
End: 2020-03-16
Payer: COMMERCIAL

## 2020-03-16 ENCOUNTER — OUTPATIENT (OUTPATIENT)
Dept: OUTPATIENT SERVICES | Facility: HOSPITAL | Age: 45
LOS: 1 days | End: 2020-03-16
Payer: COMMERCIAL

## 2020-03-16 DIAGNOSIS — N60.12 DIFFUSE CYSTIC MASTOPATHY OF LEFT BREAST: ICD-10-CM

## 2020-03-16 DIAGNOSIS — D24.1 BENIGN NEOPLASM OF RIGHT BREAST: ICD-10-CM

## 2020-03-16 PROCEDURE — 77063 BREAST TOMOSYNTHESIS BI: CPT | Mod: 26

## 2020-03-16 PROCEDURE — 77067 SCR MAMMO BI INCL CAD: CPT | Mod: 26

## 2020-03-16 PROCEDURE — 76641 ULTRASOUND BREAST COMPLETE: CPT | Mod: 26,50

## 2020-03-16 PROCEDURE — 76641 ULTRASOUND BREAST COMPLETE: CPT

## 2020-03-16 PROCEDURE — 77067 SCR MAMMO BI INCL CAD: CPT

## 2020-03-16 PROCEDURE — 77063 BREAST TOMOSYNTHESIS BI: CPT

## 2020-03-25 RX ORDER — SITAGLIPTIN 50 MG/1
50 TABLET, FILM COATED ORAL DAILY
Qty: 90 | Refills: 1 | Status: DISCONTINUED | COMMUNITY
Start: 2020-02-13 | End: 2020-03-25

## 2020-03-25 RX ORDER — ALOGLIPTIN 25 MG/1
25 TABLET, FILM COATED ORAL DAILY
Qty: 90 | Refills: 1 | Status: DISCONTINUED | COMMUNITY
Start: 2020-02-13 | End: 2020-03-25

## 2020-03-30 ENCOUNTER — APPOINTMENT (OUTPATIENT)
Dept: FAMILY MEDICINE | Facility: CLINIC | Age: 45
End: 2020-03-30
Payer: COMMERCIAL

## 2020-03-30 VITALS
BODY MASS INDEX: 33.04 KG/M2 | TEMPERATURE: 98.3 F | WEIGHT: 175 LBS | HEART RATE: 112 BPM | SYSTOLIC BLOOD PRESSURE: 135 MMHG | HEIGHT: 61 IN | OXYGEN SATURATION: 99 % | DIASTOLIC BLOOD PRESSURE: 91 MMHG

## 2020-03-30 PROCEDURE — 99214 OFFICE O/P EST MOD 30 MIN: CPT | Mod: 25

## 2020-03-30 PROCEDURE — 36415 COLL VENOUS BLD VENIPUNCTURE: CPT

## 2020-03-30 NOTE — PAST MEDICAL HISTORY
[Menstruating] : hx of menstruating [Definite ___ (Date)] : the last menstrual period was [unfilled] [Total Preg ___] : G: [unfilled] [Living ___] : Living: [unfilled]

## 2020-03-30 NOTE — ASSESSMENT
[FreeTextEntry1] : 46 y/o HF presents today for evaluation of abdominal pain/ mass in left upper quadrant for several weeks:\par \par -CT scan order\par -cbc/ cmp\par -f/up with tests results.

## 2020-03-30 NOTE — PHYSICAL EXAM
[Normal] : normal to percussion [Mass ___ cm] : a [unfilled] ~Ucm mass was palpable [LUQ] : in the left upper quadrant

## 2020-03-30 NOTE — HISTORY OF PRESENT ILLNESS
[FreeTextEntry8] : Pt present today for evaluation of abdominal pain.\par Pt States pain started several weeks ago , and in progressively worse.\par Pt states pain is contact , like 8/10 in intensity. Located in eft upper quadrant. No radiating. No aggravating or relieving factors.\par States had fever (subjective), and has been taking otc meds.\par Good appetite, No BM changes.\par No cough or headache.\par Denies any trauma, injury or fall.\par \par Pt works in factory.

## 2020-03-31 LAB
ALBUMIN SERPL ELPH-MCNC: 4.1 G/DL
ALP BLD-CCNC: 57 U/L
ALT SERPL-CCNC: 27 U/L
AMYLASE/CREAT SERPL: 49 U/L
ANION GAP SERPL CALC-SCNC: 13 MMOL/L
AST SERPL-CCNC: 20 U/L
BASOPHILS # BLD AUTO: 0.01 K/UL
BASOPHILS NFR BLD AUTO: 0.1 %
BILIRUB SERPL-MCNC: 0.2 MG/DL
BUN SERPL-MCNC: 9 MG/DL
CALCIUM SERPL-MCNC: 8.7 MG/DL
CHLORIDE SERPL-SCNC: 104 MMOL/L
CO2 SERPL-SCNC: 22 MMOL/L
CREAT SERPL-MCNC: 0.72 MG/DL
EOSINOPHIL # BLD AUTO: 0.05 K/UL
EOSINOPHIL NFR BLD AUTO: 0.6 %
GLUCOSE SERPL-MCNC: 185 MG/DL
HCT VFR BLD CALC: 42.5 %
HGB BLD-MCNC: 13.5 G/DL
IMM GRANULOCYTES NFR BLD AUTO: 0.3 %
LYMPHOCYTES # BLD AUTO: 1.57 K/UL
LYMPHOCYTES NFR BLD AUTO: 20.2 %
MAN DIFF?: NORMAL
MCHC RBC-ENTMCNC: 26.8 PG
MCHC RBC-ENTMCNC: 31.8 GM/DL
MCV RBC AUTO: 84.5 FL
MONOCYTES # BLD AUTO: 0.68 K/UL
MONOCYTES NFR BLD AUTO: 8.8 %
NEUTROPHILS # BLD AUTO: 5.43 K/UL
NEUTROPHILS NFR BLD AUTO: 70 %
PLATELET # BLD AUTO: 296 K/UL
POTASSIUM SERPL-SCNC: 4.3 MMOL/L
PROT SERPL-MCNC: 6.9 G/DL
RBC # BLD: 5.03 M/UL
RBC # FLD: 13.5 %
SODIUM SERPL-SCNC: 140 MMOL/L
WBC # FLD AUTO: 7.76 K/UL

## 2020-04-06 ENCOUNTER — OUTPATIENT (OUTPATIENT)
Dept: OUTPATIENT SERVICES | Facility: HOSPITAL | Age: 45
LOS: 1 days | End: 2020-04-06
Payer: COMMERCIAL

## 2020-04-06 ENCOUNTER — APPOINTMENT (OUTPATIENT)
Dept: CT IMAGING | Facility: CLINIC | Age: 45
End: 2020-04-06
Payer: COMMERCIAL

## 2020-04-06 ENCOUNTER — RESULT REVIEW (OUTPATIENT)
Age: 45
End: 2020-04-06

## 2020-04-06 DIAGNOSIS — R10.12 LEFT UPPER QUADRANT PAIN: ICD-10-CM

## 2020-04-06 DIAGNOSIS — Z00.00 ENCOUNTER FOR GENERAL ADULT MEDICAL EXAMINATION WITHOUT ABNORMAL FINDINGS: ICD-10-CM

## 2020-04-06 PROCEDURE — 74177 CT ABD & PELVIS W/CONTRAST: CPT

## 2020-04-06 PROCEDURE — 74177 CT ABD & PELVIS W/CONTRAST: CPT | Mod: 26

## 2020-04-21 ENCOUNTER — APPOINTMENT (OUTPATIENT)
Dept: PLASTIC SURGERY | Facility: CLINIC | Age: 45
End: 2020-04-21
Payer: COMMERCIAL

## 2020-04-21 VITALS
SYSTOLIC BLOOD PRESSURE: 128 MMHG | TEMPERATURE: 99.4 F | DIASTOLIC BLOOD PRESSURE: 86 MMHG | HEART RATE: 104 BPM | HEIGHT: 62 IN | BODY MASS INDEX: 32.39 KG/M2 | WEIGHT: 176 LBS | OXYGEN SATURATION: 97 %

## 2020-04-21 DIAGNOSIS — D16.4 BENIGN NEOPLASM OF BONES OF SKULL AND FACE: ICD-10-CM

## 2020-04-21 PROCEDURE — 99203 OFFICE O/P NEW LOW 30 MIN: CPT

## 2020-04-21 NOTE — PHYSICAL EXAM
[NI] : Normal [de-identified] : Left midddle forehead 1.5 cm x 1.5 cm well-circumscribed hard mass.  Raised 0.5 cm.  No skin changes.  Tender to firm palpation.

## 2020-04-21 NOTE — HISTORY OF PRESENT ILLNESS
[FreeTextEntry1] : 44 yo woman with about a one year history of a painful left forehead mass.  The mass has been slowly enlarging.  It is tender to palpation.  There has been no bouts of inflammation.  She has no personal history of skin cancer.  She is a non-smoker.  She is concerned about the etiology of the mass and would like to know what her options are.

## 2020-05-08 ENCOUNTER — APPOINTMENT (OUTPATIENT)
Dept: GASTROENTEROLOGY | Facility: CLINIC | Age: 45
End: 2020-05-08
Payer: COMMERCIAL

## 2020-05-08 DIAGNOSIS — A04.8 OTHER SPECIFIED BACTERIAL INTESTINAL INFECTIONS: ICD-10-CM

## 2020-05-08 PROCEDURE — 99442: CPT

## 2020-05-08 NOTE — HISTORY OF PRESENT ILLNESS
[Verbal consent obtained from patient] : the patient, [unfilled] [___ Month(s) Ago] : [unfilled] month(s) ago [None] : had no significant interval events [de-identified] : EGD [de-identified] : The patient was identified by name and date of birth. Verbal consent for a telephonic visit was obtained. The patient was advised that  insurance will be billed for this visit.\par The patient was last seen on a very 25th of this year when she underwent an upper endoscopy that revealed an LA class A esophagitis and a small duodenal erosion. Biopsies were negative for Noriega's or H. pylori. Since that time she was noted taking Naprosyn and continues to take omeprazole 40 mg p.o. q.a.m. and famotidine 40 mg every evening. On this regimen she denies any epigastric pain or heartburn. There is no dysphagia. There is no nausea or vomiting. Her appetite and weight are stable. There is no diarrhea, constipation, rectal bleeding or melena.

## 2020-05-08 NOTE — ASSESSMENT
[FreeTextEntry1] : The patient's symptoms are probably a combination of reflux esophagitis and erosive duodenitis and on all markedly improved with antacid suppression. At this time I have recommended that she continue with the same medical regimen and follow up with me in 6 months in the office.

## 2020-08-07 ENCOUNTER — APPOINTMENT (OUTPATIENT)
Dept: FAMILY MEDICINE | Facility: CLINIC | Age: 45
End: 2020-08-07
Payer: COMMERCIAL

## 2020-08-07 ENCOUNTER — RESULT CHARGE (OUTPATIENT)
Age: 45
End: 2020-08-07

## 2020-08-07 VITALS
HEIGHT: 62 IN | OXYGEN SATURATION: 98 % | HEART RATE: 91 BPM | DIASTOLIC BLOOD PRESSURE: 85 MMHG | SYSTOLIC BLOOD PRESSURE: 130 MMHG | BODY MASS INDEX: 31.28 KG/M2 | TEMPERATURE: 97.1 F | WEIGHT: 170 LBS

## 2020-08-07 DIAGNOSIS — R10.12 LEFT UPPER QUADRANT PAIN: ICD-10-CM

## 2020-08-07 LAB — HBA1C MFR BLD HPLC: 10.4

## 2020-08-07 PROCEDURE — 99214 OFFICE O/P EST MOD 30 MIN: CPT | Mod: 25

## 2020-08-07 PROCEDURE — 83036 HEMOGLOBIN GLYCOSYLATED A1C: CPT | Mod: QW

## 2020-08-07 RX ORDER — FAMOTIDINE 40 MG/1
40 TABLET, FILM COATED ORAL
Qty: 30 | Refills: 5 | Status: DISCONTINUED | COMMUNITY
Start: 2020-02-25 | End: 2020-08-07

## 2020-08-07 RX ORDER — ALBUTEROL SULFATE 90 UG/1
108 (90 BASE) INHALANT RESPIRATORY (INHALATION)
Qty: 8 | Refills: 0 | Status: DISCONTINUED | COMMUNITY
Start: 2020-04-03 | End: 2020-08-07

## 2020-08-07 RX ORDER — FLUOROMETHOLONE 1 MG/ML
0.1 SOLUTION/ DROPS OPHTHALMIC
Qty: 5 | Refills: 0 | Status: DISCONTINUED | COMMUNITY
Start: 2019-08-22 | End: 2020-08-07

## 2020-08-07 RX ORDER — AZITHROMYCIN 250 MG/1
250 TABLET, FILM COATED ORAL
Qty: 6 | Refills: 0 | Status: DISCONTINUED | COMMUNITY
Start: 2020-04-03 | End: 2020-08-07

## 2020-08-07 RX ORDER — BACITRACIN 500 [IU]/G
500 OINTMENT TOPICAL 3 TIMES DAILY
Qty: 1 | Refills: 2 | Status: DISCONTINUED | COMMUNITY
Start: 2019-12-18 | End: 2020-08-07

## 2020-08-07 RX ORDER — OMEPRAZOLE 40 MG/1
40 CAPSULE, DELAYED RELEASE ORAL TWICE DAILY
Qty: 20 | Refills: 3 | Status: DISCONTINUED | COMMUNITY
Start: 2019-10-01 | End: 2020-08-07

## 2020-08-07 RX ORDER — FAMOTIDINE 40 MG/1
40 TABLET, FILM COATED ORAL DAILY
Qty: 30 | Refills: 0 | Status: DISCONTINUED | COMMUNITY
Start: 2018-05-23 | End: 2020-08-07

## 2020-08-07 NOTE — ASSESSMENT
[FreeTextEntry1] : CPE: 02/20\par HIV: screening: negative 2018\par \par Mammo: up to date. w/ Dr Coon\par \par Hypothyroidism: On levothyroxine. \par \par GERD: doing better. \par On omeprazole.\par F/up with Dr Shah.\par \par  DM:Dx 7/2018: Education and counseling was given to pt with diet guidance. ,\par  HbA1c:>10\par -Reassume metformin 1000mg BID.\par -Continue glimepiride 4mg daily.\par -Continue Jardiance.\par -Risks and benefits d/w pt.\par -F/up in 3 months.\par -Refused Dietitian and endocrinology referral. Never done\par -Ophthalmology eval done as per pt.\par -Microalbumin urine order\par \par HTN:\par start Lisinopril 5 mg\par \par Chronic LUQ pain:\par -CT done 04/20> normal\par -USG order\par \par Further recommendations based on USG.\par F/up in 2 months\par

## 2020-08-07 NOTE — HISTORY OF PRESENT ILLNESS
[FreeTextEntry1] : DM f/up [de-identified] : 45 y/o HF, w/ hx Breast Fibroadenoma, GERD on Famotidine, DM on metformin, Jardiance and Amaryl, Hypothyroidism on levotyroxine, HTN on lisinopril.\par States run out of metformin for 1 month.\par  Seen by GI.GERd symptoms control with omeprazole.\par Reports persistent pain in LUQ on and off for long time. CT scan done 04/20> unremarkable.\par \par  \par

## 2020-08-07 NOTE — PHYSICAL EXAM
[No Acute Distress] : no acute distress [Well Nourished] : well nourished [Well Developed] : well developed [Normal Sclera/Conjunctiva] : normal sclera/conjunctiva [Well-Appearing] : well-appearing [EOMI] : extraocular movements intact [PERRL] : pupils equal round and reactive to light [Normal Outer Ear/Nose] : the outer ears and nose were normal in appearance [Normal Oropharynx] : the oropharynx was normal [No JVD] : no jugular venous distention [Supple] : supple [No Lymphadenopathy] : no lymphadenopathy [Thyroid Normal, No Nodules] : the thyroid was normal and there were no nodules present [No Respiratory Distress] : no respiratory distress  [No Accessory Muscle Use] : no accessory muscle use [Clear to Auscultation] : lungs were clear to auscultation bilaterally [Normal Rate] : normal rate  [Regular Rhythm] : with a regular rhythm [Normal S1, S2] : normal S1 and S2 [No Carotid Bruits] : no carotid bruits [No Murmur] : no murmur heard [No Varicosities] : no varicosities [No Abdominal Bruit] : a ~M bruit was not heard ~T in the abdomen [No Edema] : there was no peripheral edema [Pedal Pulses Present] : the pedal pulses are present [No Palpable Aorta] : no palpable aorta [No Extremity Clubbing/Cyanosis] : no extremity clubbing/cyanosis [Soft] : abdomen soft [No Masses] : no abdominal mass palpated [Non-distended] : non-distended [No HSM] : no HSM [Normal Bowel Sounds] : normal bowel sounds [Normal Anterior Cervical Nodes] : no anterior cervical lymphadenopathy [No CVA Tenderness] : no CVA  tenderness [Normal Posterior Cervical Nodes] : no posterior cervical lymphadenopathy [No Joint Swelling] : no joint swelling [No Spinal Tenderness] : no spinal tenderness [Grossly Normal Strength/Tone] : grossly normal strength/tone [No Rash] : no rash [Coordination Grossly Intact] : coordination grossly intact [Normal Gait] : normal gait [No Focal Deficits] : no focal deficits [Normal Affect] : the affect was normal [Normal Insight/Judgement] : insight and judgment were intact [de-identified] : tenderness in LUQ

## 2020-08-17 ENCOUNTER — APPOINTMENT (OUTPATIENT)
Dept: FAMILY MEDICINE | Facility: CLINIC | Age: 45
End: 2020-08-17
Payer: COMMERCIAL

## 2020-08-17 ENCOUNTER — NON-APPOINTMENT (OUTPATIENT)
Age: 45
End: 2020-08-17

## 2020-08-17 VITALS
OXYGEN SATURATION: 97 % | TEMPERATURE: 97.6 F | HEIGHT: 62 IN | RESPIRATION RATE: 12 BRPM | DIASTOLIC BLOOD PRESSURE: 76 MMHG | WEIGHT: 173 LBS | SYSTOLIC BLOOD PRESSURE: 128 MMHG | BODY MASS INDEX: 31.83 KG/M2 | HEART RATE: 107 BPM

## 2020-08-17 DIAGNOSIS — Z01.818 ENCOUNTER FOR OTHER PREPROCEDURAL EXAMINATION: ICD-10-CM

## 2020-08-17 DIAGNOSIS — D23.39 OTHER BENIGN NEOPLASM OF SKIN OF OTHER PARTS OF FACE: ICD-10-CM

## 2020-08-17 PROCEDURE — 36415 COLL VENOUS BLD VENIPUNCTURE: CPT

## 2020-08-17 PROCEDURE — 99214 OFFICE O/P EST MOD 30 MIN: CPT | Mod: 25

## 2020-08-17 RX ORDER — OXYCODONE 5 MG/1
5 TABLET ORAL
Qty: 10 | Refills: 0 | Status: DISCONTINUED | COMMUNITY
Start: 2020-08-13 | End: 2020-08-17

## 2020-08-17 RX ORDER — TRAMADOL HYDROCHLORIDE 50 MG/1
50 TABLET, COATED ORAL 3 TIMES DAILY
Qty: 30 | Refills: 1 | Status: DISCONTINUED | COMMUNITY
Start: 2020-03-31 | End: 2020-08-17

## 2020-08-17 RX ORDER — NAPROXEN 500 MG/1
500 TABLET, DELAYED RELEASE ORAL
Qty: 60 | Refills: 0 | Status: DISCONTINUED | COMMUNITY
Start: 2019-03-25 | End: 2020-08-17

## 2020-08-17 RX ORDER — NAPROXEN 500 MG/1
500 TABLET, DELAYED RELEASE ORAL TWICE DAILY
Qty: 60 | Refills: 0 | Status: DISCONTINUED | COMMUNITY
Start: 2019-12-26 | End: 2020-08-17

## 2020-08-17 NOTE — COUNSELING
[Weight management counseling provided] : Weight management [Fall prevention counseling provided] : fall prevention  [Behavioral health counseling provided] : behavioral health

## 2020-08-17 NOTE — PHYSICAL EXAM
[No Acute Distress] : no acute distress [Well Developed] : well developed [Well Nourished] : well nourished [Normal Sclera/Conjunctiva] : normal sclera/conjunctiva [Well-Appearing] : well-appearing [Normal Outer Ear/Nose] : the outer ears and nose were normal in appearance [EOMI] : extraocular movements intact [PERRL] : pupils equal round and reactive to light [No Lymphadenopathy] : no lymphadenopathy [No JVD] : no jugular venous distention [Normal Oropharynx] : the oropharynx was normal [Supple] : supple [Thyroid Normal, No Nodules] : the thyroid was normal and there were no nodules present [Clear to Auscultation] : lungs were clear to auscultation bilaterally [No Respiratory Distress] : no respiratory distress  [No Accessory Muscle Use] : no accessory muscle use [Normal Rate] : normal rate  [Normal S1, S2] : normal S1 and S2 [Regular Rhythm] : with a regular rhythm [No Murmur] : no murmur heard [No Abdominal Bruit] : a ~M bruit was not heard ~T in the abdomen [No Carotid Bruits] : no carotid bruits [No Palpable Aorta] : no palpable aorta [No Varicosities] : no varicosities [No Edema] : there was no peripheral edema [Pedal Pulses Present] : the pedal pulses are present [Soft] : abdomen soft [No Extremity Clubbing/Cyanosis] : no extremity clubbing/cyanosis [Non-distended] : non-distended [Non Tender] : non-tender [No Masses] : no abdominal mass palpated [Normal Bowel Sounds] : normal bowel sounds [No HSM] : no HSM [No CVA Tenderness] : no CVA  tenderness [Normal Anterior Cervical Nodes] : no anterior cervical lymphadenopathy [Normal Posterior Cervical Nodes] : no posterior cervical lymphadenopathy [No Spinal Tenderness] : no spinal tenderness [No Joint Swelling] : no joint swelling [Grossly Normal Strength/Tone] : grossly normal strength/tone [Coordination Grossly Intact] : coordination grossly intact [No Rash] : no rash [Normal Affect] : the affect was normal [No Focal Deficits] : no focal deficits [Normal Gait] : normal gait [Normal Insight/Judgement] : insight and judgment were intact

## 2020-08-17 NOTE — ASSESSMENT
[High Risk Surgery - Intraperitoneal, Intrathoracic or Supringuinal Vascular Procedures] : High Risk Surgery - Intraperitoneal, Intrathoracic or Supringuinal Vascular Procedures - No (0) [Ischemic Heart Disease] : Ischemic Heart Disease - No (0) [Creatinine >= 2mg/dL (1 Point)] : Creatinine >= 2mg/dL - No (0) [Congestive Heart Failure] : Congestive Heart Failure - No (0) [Prior Cerebrovascular Accident or TIA] : Prior Cerebrovascular Accident or TIA - No (0) [Insulin-dependent Diabetic (1 Point)] : Insulin-dependent Diabetic - No (0) [ECG] : ECG [0] : 0 , RCRI Class: I, Risk of Post-Op Cardiac Complications: 3.9%, 95% CI for Risk Estimate: 2.8% - 5.4% [As per surgery] : as per surgery [Continue medications as is] : Continue current medications

## 2020-08-18 LAB
ANION GAP SERPL CALC-SCNC: 15 MMOL/L
BASOPHILS # BLD AUTO: 0.04 K/UL
BASOPHILS NFR BLD AUTO: 0.4 %
BUN SERPL-MCNC: 12 MG/DL
CALCIUM SERPL-MCNC: 9.5 MG/DL
CHLORIDE SERPL-SCNC: 105 MMOL/L
CO2 SERPL-SCNC: 20 MMOL/L
CREAT SERPL-MCNC: 1.02 MG/DL
EOSINOPHIL # BLD AUTO: 0.1 K/UL
EOSINOPHIL NFR BLD AUTO: 1.1 %
GLUCOSE SERPL-MCNC: 292 MG/DL
HCT VFR BLD CALC: 44.7 %
HGB BLD-MCNC: 13.8 G/DL
IMM GRANULOCYTES NFR BLD AUTO: 0.3 %
INR PPP: 0.83 RATIO
LYMPHOCYTES # BLD AUTO: 3.04 K/UL
LYMPHOCYTES NFR BLD AUTO: 33 %
MAN DIFF?: NORMAL
MCHC RBC-ENTMCNC: 25 PG
MCHC RBC-ENTMCNC: 30.9 GM/DL
MCV RBC AUTO: 81.1 FL
MONOCYTES # BLD AUTO: 0.59 K/UL
MONOCYTES NFR BLD AUTO: 6.4 %
NEUTROPHILS # BLD AUTO: 5.42 K/UL
NEUTROPHILS NFR BLD AUTO: 58.8 %
PLATELET # BLD AUTO: 322 K/UL
POTASSIUM SERPL-SCNC: 4.7 MMOL/L
PT BLD: 9.9 SEC
RBC # BLD: 5.51 M/UL
RBC # FLD: 13.6 %
SODIUM SERPL-SCNC: 139 MMOL/L
WBC # FLD AUTO: 9.22 K/UL

## 2020-08-18 NOTE — PLAN
[FreeTextEntry1] : Fax clearance to [phone] [facility].\par \par Lab test results reviewed. Pt with no absolute contraindication for surgical procedure. Clear from clinical stand point.

## 2020-08-18 NOTE — RESULTS/DATA
[] : results reviewed [de-identified] : Presurgical testing done today in the office.\par Covid swab done today

## 2020-08-18 NOTE — HISTORY OF PRESENT ILLNESS
[No Pertinent Cardiac History] : no history of aortic stenosis, atrial fibrillation, coronary artery disease, recent myocardial infarction, or implantable device/pacemaker [No Pertinent Pulmonary History] : no history of asthma, COPD, sleep apnea, or smoking [No Adverse Anesthesia Reaction] : no adverse anesthesia reaction in self or family member [Diabetes] : diabetes [(Patient denies any chest pain, claudication, dyspnea on exertion, orthopnea, palpitations or syncope)] : Patient denies any chest pain, claudication, dyspnea on exertion, orthopnea, palpitations or syncope [Chronic Kidney Disease] : no chronic kidney disease [Chronic Anticoagulation] : no chronic anticoagulation [FreeTextEntry1] : excision of mass in forehead [FreeTextEntry3] : at [facility] [FreeTextEntry2] : 8/21/20 [FreeTextEntry4] : 46 y/o HF, w/ hx Breast Fibroadenoma, GERD on omeprazole,Uncontrolled DM on metformin, Jardiance and Amaryl, Hypothyroidism on levotyroxine, HTN on lisinopril.\par Pt presents for medical clearance for mass excision in left forehead.\par \par

## 2020-08-21 ENCOUNTER — APPOINTMENT (OUTPATIENT)
Dept: PLASTIC SURGERY | Facility: AMBULATORY SURGERY CENTER | Age: 45
End: 2020-08-21
Payer: COMMERCIAL

## 2020-08-21 ENCOUNTER — RESULT REVIEW (OUTPATIENT)
Age: 45
End: 2020-08-21

## 2020-08-21 PROCEDURE — 14040 TIS TRNFR F/C/C/M/N/A/G/H/F: CPT

## 2020-08-21 PROCEDURE — 21013 EXC FACE TUM DEEP < 2 CM: CPT

## 2020-08-28 ENCOUNTER — OUTPATIENT (OUTPATIENT)
Dept: OUTPATIENT SERVICES | Facility: HOSPITAL | Age: 45
LOS: 1 days | End: 2020-08-28
Payer: COMMERCIAL

## 2020-08-28 ENCOUNTER — APPOINTMENT (OUTPATIENT)
Dept: ULTRASOUND IMAGING | Facility: CLINIC | Age: 45
End: 2020-08-28
Payer: COMMERCIAL

## 2020-08-28 DIAGNOSIS — Z00.00 ENCOUNTER FOR GENERAL ADULT MEDICAL EXAMINATION WITHOUT ABNORMAL FINDINGS: ICD-10-CM

## 2020-08-28 DIAGNOSIS — R10.12 LEFT UPPER QUADRANT PAIN: ICD-10-CM

## 2020-08-28 PROCEDURE — 76700 US EXAM ABDOM COMPLETE: CPT

## 2020-08-28 PROCEDURE — 76700 US EXAM ABDOM COMPLETE: CPT | Mod: 26

## 2020-10-22 ENCOUNTER — RESULT CHARGE (OUTPATIENT)
Age: 45
End: 2020-10-22

## 2020-10-23 ENCOUNTER — APPOINTMENT (OUTPATIENT)
Dept: FAMILY MEDICINE | Facility: CLINIC | Age: 45
End: 2020-10-23
Payer: COMMERCIAL

## 2020-10-23 VITALS
OXYGEN SATURATION: 98 % | WEIGHT: 175 LBS | RESPIRATION RATE: 14 BRPM | DIASTOLIC BLOOD PRESSURE: 68 MMHG | SYSTOLIC BLOOD PRESSURE: 110 MMHG | BODY MASS INDEX: 32.2 KG/M2 | TEMPERATURE: 97.5 F | HEIGHT: 62 IN | HEART RATE: 93 BPM

## 2020-10-23 PROCEDURE — 99072 ADDL SUPL MATRL&STAF TM PHE: CPT

## 2020-10-23 PROCEDURE — 99214 OFFICE O/P EST MOD 30 MIN: CPT | Mod: 25

## 2020-10-23 PROCEDURE — G0008: CPT

## 2020-10-23 PROCEDURE — 90686 IIV4 VACC NO PRSV 0.5 ML IM: CPT

## 2020-10-23 RX ORDER — EMPAGLIFLOZIN 25 MG/1
25 TABLET, FILM COATED ORAL DAILY
Qty: 90 | Refills: 1 | Status: DISCONTINUED | COMMUNITY
Start: 2020-03-25 | End: 2020-10-23

## 2020-10-23 NOTE — PHYSICAL EXAM
[No Acute Distress] : no acute distress [Well Nourished] : well nourished [Well Developed] : well developed [Well-Appearing] : well-appearing [Normal Sclera/Conjunctiva] : normal sclera/conjunctiva [PERRL] : pupils equal round and reactive to light [EOMI] : extraocular movements intact [Normal Outer Ear/Nose] : the outer ears and nose were normal in appearance [Normal Oropharynx] : the oropharynx was normal [No JVD] : no jugular venous distention [No Lymphadenopathy] : no lymphadenopathy [Supple] : supple [Thyroid Normal, No Nodules] : the thyroid was normal and there were no nodules present [No Respiratory Distress] : no respiratory distress  [No Accessory Muscle Use] : no accessory muscle use [Clear to Auscultation] : lungs were clear to auscultation bilaterally [Normal Rate] : normal rate  [Regular Rhythm] : with a regular rhythm [Normal S1, S2] : normal S1 and S2 [No Murmur] : no murmur heard [No Carotid Bruits] : no carotid bruits [No Abdominal Bruit] : a ~M bruit was not heard ~T in the abdomen [No Varicosities] : no varicosities [Pedal Pulses Present] : the pedal pulses are present [No Edema] : there was no peripheral edema [No Palpable Aorta] : no palpable aorta [No Extremity Clubbing/Cyanosis] : no extremity clubbing/cyanosis [Soft] : abdomen soft [Non-distended] : non-distended [No Masses] : no abdominal mass palpated [No HSM] : no HSM [Normal Bowel Sounds] : normal bowel sounds [Normal Posterior Cervical Nodes] : no posterior cervical lymphadenopathy [Normal Anterior Cervical Nodes] : no anterior cervical lymphadenopathy [No CVA Tenderness] : no CVA  tenderness [No Spinal Tenderness] : no spinal tenderness [No Joint Swelling] : no joint swelling [Grossly Normal Strength/Tone] : grossly normal strength/tone [No Rash] : no rash [Coordination Grossly Intact] : coordination grossly intact [No Focal Deficits] : no focal deficits [Normal Gait] : normal gait [Normal Affect] : the affect was normal [Normal Insight/Judgement] : insight and judgment were intact [de-identified] : tenderness in LUQ

## 2020-10-23 NOTE — HISTORY OF PRESENT ILLNESS
[FreeTextEntry1] : DM f/up [de-identified] : 44 y/o HF, w/ hx Breast Fibroadenoma, GERD on Famotidine, Uncontroleed DM on metformin, and Amaryl, Hypothyroidism on levotyroxine, HTN on lisinopril.States she never got jardiance prescribed last visit.\par  Seen by GI.GERd symptoms control with omeprazole.\par hepatomegaly, fatty liver.\par \par  \par

## 2020-10-23 NOTE — ASSESSMENT
[FreeTextEntry1] : CPE: 02/20\par HIV: screening: negative 2018\par \par Mammo: up to date. w/ Dr Coon\par \par Hypothyroidism: On levothyroxine. \par \par GERD: \par On omeprazole.\par F/up with Dr Shah.\par \par  DM:Dx 7/2018: Education and counseling was given to pt with diet guidance. ,\par  HbA1c:>10\par -Reassume metformin 1000mg BID.\par -Continue glimepiride 4mg daily.\par -Start Pioglitazone 30mg\par -Risks and benefits d/w pt.\par -F/up in 3 months.\par -Dietitian and endocrinology referral. \par -Ophthalmology eval done as per pt.\par \par HTN:\par start Lisinopril 5 mg\par \par Chronic LUQ pain:\par -CT done 04/20> normal\par -USG 08/20: Fatty liver/ hepatomegaly\par -GI f/up\par \par F/up in 2 months\par

## 2020-11-18 ENCOUNTER — APPOINTMENT (OUTPATIENT)
Dept: CHRONIC DISEASE MANAGEMENT | Facility: CLINIC | Age: 45
End: 2020-11-18

## 2020-11-18 ENCOUNTER — NON-APPOINTMENT (OUTPATIENT)
Age: 45
End: 2020-11-18

## 2020-12-02 ENCOUNTER — APPOINTMENT (OUTPATIENT)
Dept: GASTROENTEROLOGY | Facility: CLINIC | Age: 45
End: 2020-12-02
Payer: COMMERCIAL

## 2020-12-02 VITALS
OXYGEN SATURATION: 99 % | SYSTOLIC BLOOD PRESSURE: 139 MMHG | HEIGHT: 62 IN | WEIGHT: 178 LBS | BODY MASS INDEX: 32.76 KG/M2 | RESPIRATION RATE: 14 BRPM | HEART RATE: 98 BPM | TEMPERATURE: 97.9 F | DIASTOLIC BLOOD PRESSURE: 95 MMHG

## 2020-12-02 DIAGNOSIS — R10.13 EPIGASTRIC PAIN: ICD-10-CM

## 2020-12-02 PROCEDURE — 99072 ADDL SUPL MATRL&STAF TM PHE: CPT

## 2020-12-02 PROCEDURE — 99214 OFFICE O/P EST MOD 30 MIN: CPT

## 2020-12-02 NOTE — PHYSICAL EXAM
[Sclera] : the sclera and conjunctiva were normal [PERRL With Normal Accommodation] : pupils were equal in size, round, and reactive to light [Extraocular Movements] : extraocular movements were intact [Outer Ear] : the ears and nose were normal in appearance [Hearing Threshold Finger Rub Not [place]] : hearing was normal [Examination Of The Oral Cavity] : the lips and gums were normal [Oropharynx] : the oropharynx was normal [Neck Appearance] : the appearance of the neck was normal [Heart Rate And Rhythm] : heart rate was normal and rhythm regular [Bowel Sounds] : normal bowel sounds [Abdomen Soft] : soft [Abdomen Tenderness] : non-tender [Abdomen Mass (___ Cm)] : no abdominal mass palpated [No CVA Tenderness] : no ~M costovertebral angle tenderness [No Spinal Tenderness] : no spinal tenderness [Abnormal Walk] : normal gait [Nail Clubbing] : no clubbing  or cyanosis of the fingernails [Musculoskeletal - Swelling] : no joint swelling seen [Motor Tone] : muscle strength and tone were normal [Skin Color & Pigmentation] : normal skin color and pigmentation [Skin Turgor] : normal skin turgor [] : no rash [Motor Exam] : the motor exam was normal [No Focal Deficits] : no focal deficits [Oriented To Time, Place, And Person] : oriented to person, place, and time [Impaired Insight] : insight and judgment were intact [Affect] : the affect was normal [FreeTextEntry1] :  a physical exam was not performed as this was a telephonic visit

## 2020-12-02 NOTE — ASSESSMENT
[FreeTextEntry1] : The patient has reflux esophagitis and I recommended that she restart the omeprazole and famotidine. The fatty liver is due to her excess weight and diabetes but her liver function tests are normal and I simply recommended that she try to lose weight. No further evaluation of therapy is necessary at this time. She'll be seen again in 6 months for further followup.

## 2020-12-02 NOTE — HISTORY OF PRESENT ILLNESS
[de-identified] : Earlier this year an upper endoscopy was performed to evaluate her epigastric pain and reflux symptoms that revealed an LA class A reflux esophagitis. Biopsies were negative for H. pylori which had been previously treated with successful eradication. She was supposed to be taking omeprazole 40 mg p.o. q.a.m. and famotidine 40 mg every evening on a chronic basis on which she was asymptomatic. Nevertheless she stopped the medications 2 months ago and began to experience some left upper quadrant pain for which she underwent an abdominal sonogram to identify fatty liver. She is overweight an diabetic. Recent liver function tests were normal. She denies any diarrhea, constipation, rectal bleeding or melena. Her appetite and weight are stable. There is no nausea or vomiting.

## 2020-12-15 PROBLEM — Z01.419 ENCOUNTER FOR GYNECOLOGICAL EXAMINATION WITHOUT ABNORMAL FINDING: Status: RESOLVED | Noted: 2017-09-29 | Resolved: 2020-12-15

## 2021-01-21 ENCOUNTER — APPOINTMENT (OUTPATIENT)
Dept: FAMILY MEDICINE | Facility: CLINIC | Age: 46
End: 2021-01-21
Payer: COMMERCIAL

## 2021-01-21 VITALS
WEIGHT: 178 LBS | SYSTOLIC BLOOD PRESSURE: 118 MMHG | HEART RATE: 98 BPM | RESPIRATION RATE: 14 BRPM | HEIGHT: 62 IN | OXYGEN SATURATION: 99 % | DIASTOLIC BLOOD PRESSURE: 80 MMHG | BODY MASS INDEX: 32.76 KG/M2 | TEMPERATURE: 98 F

## 2021-01-21 DIAGNOSIS — L65.9 NONSCARRING HAIR LOSS, UNSPECIFIED: ICD-10-CM

## 2021-01-21 DIAGNOSIS — E11.65 TYPE 2 DIABETES MELLITUS WITH HYPERGLYCEMIA: ICD-10-CM

## 2021-01-21 PROCEDURE — G0447 BEHAVIOR COUNSEL OBESITY 15M: CPT

## 2021-01-21 PROCEDURE — 99072 ADDL SUPL MATRL&STAF TM PHE: CPT

## 2021-01-21 PROCEDURE — 99214 OFFICE O/P EST MOD 30 MIN: CPT | Mod: 25

## 2021-01-21 PROCEDURE — 83036 HEMOGLOBIN GLYCOSYLATED A1C: CPT | Mod: QW

## 2021-01-21 NOTE — COUNSELING
[Fall prevention counseling provided] : Fall prevention counseling provided [Behavioral health counseling provided] : Behavioral health counseling provided [None] : None [Potential consequences of obesity discussed] : Potential consequences of obesity discussed [Encouraged to maintain food diary] : Encouraged to maintain food diary [Encouraged to increase physical activity] : Encouraged to increase physical activity [Target Wt Loss Goal ___] : Weight Loss Goals: Target weight loss goal [unfilled] lbs [____ min/wk Activity] : [unfilled] min/wk activity [Good understanding] : Patient has a good understanding of disease, goals and obesity follow-up plan [FreeTextEntry4] : 15

## 2021-01-21 NOTE — ASSESSMENT
[FreeTextEntry1] : CPE: 02/20\par HIV: screening: negative 2018\par \par Mammo: up to date. w/ Dr Coon\par \par Hypothyroidism: On levothyroxine. \par \par GERD: \par On omeprazole.\par F/up with Dr Shah.\par \par  DM:Dx 7/2018: Education and counseling was given to pt with diet guidance. ,\par  HbA1c: 7\par -Continue metformin 1000mg BID.\par -Continue glimepiride 4mg daily.\par -Continue Pioglitazone 30mg\par -Risks and benefits d/w pt.\par -F/up in 3 months.\par -Seen by Dietitian.\par -Ophthalmology eval done as per pt.\par \par HTN:\par on Lisinopril 5 mg\par \par Chronic LUQ pain:\par -CT done 04/20> normal\par -USG 08/20: Fatty liver/ hepatomegaly\par -GI f/up\par \par F/up in 3 months\par

## 2021-01-21 NOTE — HISTORY OF PRESENT ILLNESS
[FreeTextEntry1] : DM f/up [de-identified] : 44 y/o HF, w/ hx Breast Fibroadenoma, GERD on Famotidine, Uncontroleed DM on metformin,  Amaryl and Actos Hypothyroidism on levotyroxine, HTN on lisinopril.\par last HbA1c: > 10\par  Seen by GI.GERd symptoms control with omeprazole.\par hepatomegaly, fatty liver.\par She is seen by Breast surgeon, Dr barnes.\par \par  \par

## 2021-01-21 NOTE — PHYSICAL EXAM
[Well Nourished] : well nourished [Well Developed] : well developed [Well-Appearing] : well-appearing [Normal Sclera/Conjunctiva] : normal sclera/conjunctiva [PERRL] : pupils equal round and reactive to light [EOMI] : extraocular movements intact [Normal Outer Ear/Nose] : the outer ears and nose were normal in appearance [Normal Oropharynx] : the oropharynx was normal [No JVD] : no jugular venous distention [No Lymphadenopathy] : no lymphadenopathy [Supple] : supple [Thyroid Normal, No Nodules] : the thyroid was normal and there were no nodules present [No Respiratory Distress] : no respiratory distress  [No Accessory Muscle Use] : no accessory muscle use [Clear to Auscultation] : lungs were clear to auscultation bilaterally [Normal Rate] : normal rate  [Regular Rhythm] : with a regular rhythm [Normal S1, S2] : normal S1 and S2 [No Murmur] : no murmur heard [No Carotid Bruits] : no carotid bruits [No Abdominal Bruit] : a ~M bruit was not heard ~T in the abdomen [No Varicosities] : no varicosities [Pedal Pulses Present] : the pedal pulses are present [No Edema] : there was no peripheral edema [No Palpable Aorta] : no palpable aorta [No Extremity Clubbing/Cyanosis] : no extremity clubbing/cyanosis [Soft] : abdomen soft [Non-distended] : non-distended [No Masses] : no abdominal mass palpated [No HSM] : no HSM [Normal Bowel Sounds] : normal bowel sounds [Normal Posterior Cervical Nodes] : no posterior cervical lymphadenopathy [Normal Anterior Cervical Nodes] : no anterior cervical lymphadenopathy [No CVA Tenderness] : no CVA  tenderness [No Spinal Tenderness] : no spinal tenderness [No Joint Swelling] : no joint swelling [Grossly Normal Strength/Tone] : grossly normal strength/tone [No Rash] : no rash [Coordination Grossly Intact] : coordination grossly intact [No Focal Deficits] : no focal deficits [Normal Gait] : normal gait [Normal Affect] : the affect was normal [Normal Insight/Judgement] : insight and judgment were intact [de-identified] : tenderness in LUQ

## 2021-01-22 ENCOUNTER — RESULT CHARGE (OUTPATIENT)
Age: 46
End: 2021-01-22

## 2021-01-25 LAB
ANION GAP SERPL CALC-SCNC: 14 MMOL/L
BUN SERPL-MCNC: 12 MG/DL
CALCIUM SERPL-MCNC: 9.5 MG/DL
CHLORIDE SERPL-SCNC: 105 MMOL/L
CO2 SERPL-SCNC: 22 MMOL/L
CREAT SERPL-MCNC: 0.79 MG/DL
GLUCOSE SERPL-MCNC: 110 MG/DL
POTASSIUM SERPL-SCNC: 4.4 MMOL/L
SODIUM SERPL-SCNC: 141 MMOL/L
TSH SERPL-ACNC: 2.1 UIU/ML

## 2021-02-06 ENCOUNTER — RESULT REVIEW (OUTPATIENT)
Age: 46
End: 2021-02-06

## 2021-02-06 ENCOUNTER — APPOINTMENT (OUTPATIENT)
Dept: MAMMOGRAPHY | Facility: CLINIC | Age: 46
End: 2021-02-06
Payer: COMMERCIAL

## 2021-02-06 ENCOUNTER — APPOINTMENT (OUTPATIENT)
Dept: ULTRASOUND IMAGING | Facility: CLINIC | Age: 46
End: 2021-02-06
Payer: COMMERCIAL

## 2021-02-06 ENCOUNTER — OUTPATIENT (OUTPATIENT)
Dept: OUTPATIENT SERVICES | Facility: HOSPITAL | Age: 46
LOS: 1 days | End: 2021-02-06
Payer: COMMERCIAL

## 2021-02-06 DIAGNOSIS — Z00.8 ENCOUNTER FOR OTHER GENERAL EXAMINATION: ICD-10-CM

## 2021-02-06 DIAGNOSIS — N63.15 UNSPECIFIED LUMP IN THE RIGHT BREAST, OVERLAPPING QUADRANTS: ICD-10-CM

## 2021-02-06 PROCEDURE — 76641 ULTRASOUND BREAST COMPLETE: CPT | Mod: 26,50

## 2021-02-06 PROCEDURE — 77066 DX MAMMO INCL CAD BI: CPT | Mod: 26

## 2021-02-06 PROCEDURE — G0279: CPT | Mod: 26

## 2021-02-06 PROCEDURE — 76641 ULTRASOUND BREAST COMPLETE: CPT

## 2021-02-06 PROCEDURE — G0279: CPT

## 2021-02-06 PROCEDURE — 77066 DX MAMMO INCL CAD BI: CPT

## 2021-02-09 ENCOUNTER — APPOINTMENT (OUTPATIENT)
Dept: DERMATOLOGY | Facility: CLINIC | Age: 46
End: 2021-02-09
Payer: COMMERCIAL

## 2021-02-09 PROCEDURE — 99214 OFFICE O/P EST MOD 30 MIN: CPT

## 2021-02-09 PROCEDURE — 99072 ADDL SUPL MATRL&STAF TM PHE: CPT

## 2021-02-18 ENCOUNTER — OUTPATIENT (OUTPATIENT)
Dept: OUTPATIENT SERVICES | Facility: HOSPITAL | Age: 46
LOS: 1 days | End: 2021-02-18
Payer: COMMERCIAL

## 2021-02-18 ENCOUNTER — RESULT REVIEW (OUTPATIENT)
Age: 46
End: 2021-02-18

## 2021-02-18 ENCOUNTER — APPOINTMENT (OUTPATIENT)
Dept: ULTRASOUND IMAGING | Facility: CLINIC | Age: 46
End: 2021-02-18
Payer: COMMERCIAL

## 2021-02-18 DIAGNOSIS — N64.4 MASTODYNIA: ICD-10-CM

## 2021-02-18 DIAGNOSIS — Z00.8 ENCOUNTER FOR OTHER GENERAL EXAMINATION: ICD-10-CM

## 2021-02-18 DIAGNOSIS — N63.15 UNSPECIFIED LUMP IN THE RIGHT BREAST, OVERLAPPING QUADRANTS: ICD-10-CM

## 2021-02-18 PROCEDURE — 19083 BX BREAST 1ST LESION US IMAG: CPT

## 2021-02-18 PROCEDURE — 77065 DX MAMMO INCL CAD UNI: CPT

## 2021-02-18 PROCEDURE — 77065 DX MAMMO INCL CAD UNI: CPT | Mod: 26,RT

## 2021-02-18 PROCEDURE — 19083 BX BREAST 1ST LESION US IMAG: CPT | Mod: RT

## 2021-02-18 PROCEDURE — 88305 TISSUE EXAM BY PATHOLOGIST: CPT

## 2021-02-18 PROCEDURE — 88305 TISSUE EXAM BY PATHOLOGIST: CPT | Mod: 26

## 2021-02-18 PROCEDURE — A4648: CPT

## 2021-03-26 ENCOUNTER — RESULT REVIEW (OUTPATIENT)
Age: 46
End: 2021-03-26

## 2021-03-30 ENCOUNTER — APPOINTMENT (OUTPATIENT)
Dept: DERMATOLOGY | Facility: CLINIC | Age: 46
End: 2021-03-30
Payer: COMMERCIAL

## 2021-03-30 PROCEDURE — 99213 OFFICE O/P EST LOW 20 MIN: CPT

## 2021-03-30 PROCEDURE — 99072 ADDL SUPL MATRL&STAF TM PHE: CPT

## 2021-04-05 ENCOUNTER — APPOINTMENT (OUTPATIENT)
Dept: FAMILY MEDICINE | Facility: CLINIC | Age: 46
End: 2021-04-05

## 2021-04-19 ENCOUNTER — APPOINTMENT (OUTPATIENT)
Dept: FAMILY MEDICINE | Facility: CLINIC | Age: 46
End: 2021-04-19
Payer: COMMERCIAL

## 2021-04-19 VITALS
HEART RATE: 86 BPM | HEIGHT: 62 IN | TEMPERATURE: 98.3 F | WEIGHT: 184 LBS | OXYGEN SATURATION: 98 % | SYSTOLIC BLOOD PRESSURE: 116 MMHG | RESPIRATION RATE: 16 BRPM | BODY MASS INDEX: 33.86 KG/M2 | DIASTOLIC BLOOD PRESSURE: 80 MMHG

## 2021-04-19 LAB — HBA1C MFR BLD HPLC: 7.1

## 2021-04-19 PROCEDURE — 99214 OFFICE O/P EST MOD 30 MIN: CPT | Mod: 25

## 2021-04-19 PROCEDURE — 83036 HEMOGLOBIN GLYCOSYLATED A1C: CPT | Mod: QW

## 2021-04-19 PROCEDURE — 99072 ADDL SUPL MATRL&STAF TM PHE: CPT

## 2021-04-19 RX ORDER — MOMETASONE FUROATE 1 MG/ML
0.1 SOLUTION TOPICAL
Qty: 60 | Refills: 0 | Status: COMPLETED | COMMUNITY
Start: 2021-04-17

## 2021-04-19 RX ORDER — CLINDAMYCIN HYDROCHLORIDE 300 MG/1
300 CAPSULE ORAL
Qty: 28 | Refills: 0 | Status: COMPLETED | COMMUNITY
Start: 2021-01-26

## 2021-04-19 RX ORDER — IBUPROFEN 600 MG/1
600 TABLET, FILM COATED ORAL
Qty: 28 | Refills: 0 | Status: COMPLETED | COMMUNITY
Start: 2021-01-26

## 2021-04-19 RX ORDER — SELENIUM SULFIDE 25 MG/ML
2.5 LOTION TOPICAL
Qty: 120 | Refills: 0 | Status: COMPLETED | COMMUNITY
Start: 2021-04-17

## 2021-04-19 RX ORDER — HYDROCORTISONE 25 MG/G
2.5 OINTMENT TOPICAL
Qty: 57 | Refills: 0 | Status: COMPLETED | COMMUNITY
Start: 2021-04-17

## 2021-04-19 NOTE — HISTORY OF PRESENT ILLNESS
[FreeTextEntry1] : DM f/up [de-identified] : 45 y/o HF, w/ hx Breast Fibroadenoma, GERD on Famotidine, Uncontrolled DM on metformin,  Amaryl and Actos; Hypothyroidism on levotyroxine, HTN on lisinopril.\par last HbA1c:7\par  Seen by GI.GERd symptoms control with omeprazole and Famotidine.\par hepatomegaly, fatty liver.\par She is seen by Breast surgeon, Dr barnes. She had a recent mammo and breast Bxp in 02/2021.\par She had a recent gyn exam with Dr Emelina Carney in [place].\par \par  \par

## 2021-04-19 NOTE — COUNSELING
[Fall prevention counseling provided] : Fall prevention counseling provided [Behavioral health counseling provided] : Behavioral health counseling provided [Potential consequences of obesity discussed] : Potential consequences of obesity discussed [Encouraged to maintain food diary] : Encouraged to maintain food diary [Encouraged to increase physical activity] : Encouraged to increase physical activity [Target Wt Loss Goal ___] : Weight Loss Goals: Target weight loss goal [unfilled] lbs [____ min/wk Activity] : [unfilled] min/wk activity [None] : None [Good understanding] : Patient has a good understanding of lifestyle changes and steps needed to achieve self management goal [FreeTextEntry4] : 15 verbalization

## 2021-04-19 NOTE — ASSESSMENT
[FreeTextEntry1] : CPE: 02/20\par HIV: screening: negative 2018\par \par Mammo: 02/2021.S/P Breast bxp.\par F/up  w/ Dr Coon\par \par Hypothyroidism: On levothyroxine. \par \par GERD: \par On omeprazole and famotidine.\par F/up with Dr Shah.\par \par  DM:Dx 7/2018: Education and counseling was given to pt with diet guidance. ,\par  HbA1c: 7.1%\par -Continue metformin 1000mg BID.\par -Continue glimepiride 4mg daily.\par -Continue Pioglitazone 30mg\par -Risks and benefits d/w pt.\par -F/up in 3 months.\par -Seen by Dietitian.\par -Ophthalmology referral.\par \par HTN:\par on Lisinopril 5 mg\par \par Chronic LUQ pain:\par -CT done 04/20> normal\par -USG 08/20: Fatty liver/ hepatomegaly\par -GI f/up\par \par F/up in 3 months.\par Blood drawn today\par

## 2021-04-19 NOTE — PHYSICAL EXAM
[Well Nourished] : well nourished [Well Developed] : well developed [Well-Appearing] : well-appearing [Normal Sclera/Conjunctiva] : normal sclera/conjunctiva [PERRL] : pupils equal round and reactive to light [EOMI] : extraocular movements intact [Normal Outer Ear/Nose] : the outer ears and nose were normal in appearance [Normal Oropharynx] : the oropharynx was normal [No JVD] : no jugular venous distention [No Lymphadenopathy] : no lymphadenopathy [Supple] : supple [Thyroid Normal, No Nodules] : the thyroid was normal and there were no nodules present [No Respiratory Distress] : no respiratory distress  [No Accessory Muscle Use] : no accessory muscle use [Clear to Auscultation] : lungs were clear to auscultation bilaterally [Normal Rate] : normal rate  [Regular Rhythm] : with a regular rhythm [Normal S1, S2] : normal S1 and S2 [No Murmur] : no murmur heard [No Carotid Bruits] : no carotid bruits [No Abdominal Bruit] : a ~M bruit was not heard ~T in the abdomen [No Varicosities] : no varicosities [Pedal Pulses Present] : the pedal pulses are present [No Edema] : there was no peripheral edema [No Palpable Aorta] : no palpable aorta [No Extremity Clubbing/Cyanosis] : no extremity clubbing/cyanosis [Soft] : abdomen soft [Non-distended] : non-distended [No Masses] : no abdominal mass palpated [No HSM] : no HSM [Normal Bowel Sounds] : normal bowel sounds [Normal Posterior Cervical Nodes] : no posterior cervical lymphadenopathy [Normal Anterior Cervical Nodes] : no anterior cervical lymphadenopathy [No CVA Tenderness] : no CVA  tenderness [No Spinal Tenderness] : no spinal tenderness [No Joint Swelling] : no joint swelling [Grossly Normal Strength/Tone] : grossly normal strength/tone [No Rash] : no rash [Coordination Grossly Intact] : coordination grossly intact [No Focal Deficits] : no focal deficits [Normal Gait] : normal gait [Normal Affect] : the affect was normal [Normal Insight/Judgement] : insight and judgment were intact [de-identified] : tenderness in LUQ

## 2021-04-21 LAB
25(OH)D3 SERPL-MCNC: 20.2 NG/ML
ALBUMIN SERPL ELPH-MCNC: 4.5 G/DL
ALP BLD-CCNC: 47 U/L
ALT SERPL-CCNC: 25 U/L
ANION GAP SERPL CALC-SCNC: 11 MMOL/L
APPEARANCE: CLEAR
AST SERPL-CCNC: 17 U/L
BASOPHILS # BLD AUTO: 0.07 K/UL
BASOPHILS NFR BLD AUTO: 0.9 %
BILIRUB SERPL-MCNC: 0.3 MG/DL
BILIRUBIN URINE: NEGATIVE
BLOOD URINE: NEGATIVE
BUN SERPL-MCNC: 14 MG/DL
CALCIUM SERPL-MCNC: 9.5 MG/DL
CHLORIDE SERPL-SCNC: 103 MMOL/L
CHOLEST SERPL-MCNC: 202 MG/DL
CO2 SERPL-SCNC: 26 MMOL/L
COLOR: COLORLESS
CREAT SERPL-MCNC: 0.77 MG/DL
CREAT SPEC-SCNC: 20 MG/DL
EOSINOPHIL # BLD AUTO: 0.27 K/UL
EOSINOPHIL NFR BLD AUTO: 3.4 %
GLUCOSE QUALITATIVE U: NEGATIVE
GLUCOSE SERPL-MCNC: 69 MG/DL
HCT VFR BLD CALC: 42.2 %
HDLC SERPL-MCNC: 48 MG/DL
HGB BLD-MCNC: 12.5 G/DL
IMM GRANULOCYTES NFR BLD AUTO: 0.3 %
KETONES URINE: NEGATIVE
LDLC SERPL CALC-MCNC: 129 MG/DL
LEUKOCYTE ESTERASE URINE: NEGATIVE
LYMPHOCYTES # BLD AUTO: 3.11 K/UL
LYMPHOCYTES NFR BLD AUTO: 39.6 %
MAN DIFF?: NORMAL
MCHC RBC-ENTMCNC: 25.2 PG
MCHC RBC-ENTMCNC: 29.6 GM/DL
MCV RBC AUTO: 84.9 FL
MICROALBUMIN 24H UR DL<=1MG/L-MCNC: <1.2 MG/DL
MICROALBUMIN/CREAT 24H UR-RTO: NORMAL MG/G
MONOCYTES # BLD AUTO: 0.48 K/UL
MONOCYTES NFR BLD AUTO: 6.1 %
NEUTROPHILS # BLD AUTO: 3.91 K/UL
NEUTROPHILS NFR BLD AUTO: 49.7 %
NITRITE URINE: NEGATIVE
NONHDLC SERPL-MCNC: 154 MG/DL
PH URINE: 6
PLATELET # BLD AUTO: 403 K/UL
POTASSIUM SERPL-SCNC: 4.3 MMOL/L
PROT SERPL-MCNC: 7.1 G/DL
PROTEIN URINE: NEGATIVE
RBC # BLD: 4.97 M/UL
RBC # FLD: 14.3 %
SODIUM SERPL-SCNC: 140 MMOL/L
SPECIFIC GRAVITY URINE: 1
TRIGL SERPL-MCNC: 127 MG/DL
TSH SERPL-ACNC: 2.42 UIU/ML
UROBILINOGEN URINE: NORMAL
WBC # FLD AUTO: 7.86 K/UL

## 2021-06-18 ENCOUNTER — APPOINTMENT (OUTPATIENT)
Dept: DERMATOLOGY | Facility: CLINIC | Age: 46
End: 2021-06-18
Payer: COMMERCIAL

## 2021-06-18 PROCEDURE — 99212 OFFICE O/P EST SF 10 MIN: CPT

## 2021-08-12 ENCOUNTER — APPOINTMENT (OUTPATIENT)
Dept: FAMILY MEDICINE | Facility: CLINIC | Age: 46
End: 2021-08-12
Payer: COMMERCIAL

## 2021-08-12 VITALS
SYSTOLIC BLOOD PRESSURE: 124 MMHG | WEIGHT: 183 LBS | RESPIRATION RATE: 12 BRPM | OXYGEN SATURATION: 98 % | TEMPERATURE: 97.8 F | BODY MASS INDEX: 33.68 KG/M2 | DIASTOLIC BLOOD PRESSURE: 80 MMHG | HEIGHT: 62 IN | HEART RATE: 88 BPM

## 2021-08-12 PROCEDURE — 99214 OFFICE O/P EST MOD 30 MIN: CPT | Mod: 25

## 2021-08-12 PROCEDURE — 83036 HEMOGLOBIN GLYCOSYLATED A1C: CPT | Mod: QW

## 2021-08-12 RX ORDER — FAMOTIDINE 40 MG/1
40 TABLET, FILM COATED ORAL
Qty: 30 | Refills: 5 | Status: DISCONTINUED | COMMUNITY
Start: 2020-12-02 | End: 2021-08-12

## 2021-08-12 NOTE — PHYSICAL EXAM
[Well Nourished] : well nourished [Well Developed] : well developed [Well-Appearing] : well-appearing [Normal Sclera/Conjunctiva] : normal sclera/conjunctiva [PERRL] : pupils equal round and reactive to light [EOMI] : extraocular movements intact [Normal Outer Ear/Nose] : the outer ears and nose were normal in appearance [Normal Oropharynx] : the oropharynx was normal [No JVD] : no jugular venous distention [No Lymphadenopathy] : no lymphadenopathy [Supple] : supple [Thyroid Normal, No Nodules] : the thyroid was normal and there were no nodules present [No Respiratory Distress] : no respiratory distress  [No Accessory Muscle Use] : no accessory muscle use [Clear to Auscultation] : lungs were clear to auscultation bilaterally [Normal Rate] : normal rate  [Regular Rhythm] : with a regular rhythm [Normal S1, S2] : normal S1 and S2 [No Murmur] : no murmur heard [No Carotid Bruits] : no carotid bruits [No Abdominal Bruit] : a ~M bruit was not heard ~T in the abdomen [No Varicosities] : no varicosities [Pedal Pulses Present] : the pedal pulses are present [No Edema] : there was no peripheral edema [No Palpable Aorta] : no palpable aorta [No Extremity Clubbing/Cyanosis] : no extremity clubbing/cyanosis [Soft] : abdomen soft [Non-distended] : non-distended [No Masses] : no abdominal mass palpated [No HSM] : no HSM [Normal Bowel Sounds] : normal bowel sounds [Normal Posterior Cervical Nodes] : no posterior cervical lymphadenopathy [Normal Anterior Cervical Nodes] : no anterior cervical lymphadenopathy [No CVA Tenderness] : no CVA  tenderness [No Spinal Tenderness] : no spinal tenderness [No Joint Swelling] : no joint swelling [Grossly Normal Strength/Tone] : grossly normal strength/tone [No Rash] : no rash [Coordination Grossly Intact] : coordination grossly intact [No Focal Deficits] : no focal deficits [Normal Gait] : normal gait [Normal Affect] : the affect was normal [Normal Insight/Judgement] : insight and judgment were intact [de-identified] : tenderness in LUQ

## 2021-08-12 NOTE — HISTORY OF PRESENT ILLNESS
[FreeTextEntry1] : DM f/up [de-identified] : 47 y/o HF, w/ hx Breast Fibroadenoma, GERD on Famotidine, Uncontrolled DM on metformin,  Amaryl and Actos; Hypothyroidism on levotyroxine, HTN on lisinopril.\par last HbA1c:7\par  Seen by GI.GERd symptoms control with omeprazole and Famotidine.\par hepatomegaly, fatty liver.\par She is seen by Breast surgeon, Dr coon. She had a recent mammo and breast Bxp in 02/2021 with clip [facility]. She sytates persist with pain in RT breast, and was not able to get appointmente with Dr Coon until 03/2022.\par She had a recent gyn exam with Dr Emelina Carney in [place].\par Had 1st dose Pfizer Civid vaccine on 8/12/21. Schedule for 2nd dose today.\par \par  \par

## 2021-08-12 NOTE — ASSESSMENT
[FreeTextEntry1] : CPE: 02/20\par HIV: screening: negative 2018\par \par Mammo: 02/2021.S/P Breast bxp.\par F/up  w/ Dr Coon\par \par Hypothyroidism: On levothyroxine. \par \par GERD: \par On omeprazole and famotidine.\par F/up with Dr Shah.\par \par  DM:Dx 7/2018: Education and counseling was given to pt with diet guidance. ,\par  HbA1c: 6.8%\par -Continue metformin 1000mg BID.\par -Continue glimepiride 4mg daily.\par -Continue Pioglitazone 30mg\par -Risks and benefits d/w pt.\par -F/up in 3 months.\par -Seen by Dietitian.\par -Ophthalmology > 05/2021\par \par HTN:\par on Lisinopril 5 mg\par \par Chronic LUQ pain:\par -CT done 04/20> normal\par -USG 08/20: Fatty liver/ hepatomegaly\par -GI f/up\par \par F/up in 3 months.\par \par

## 2021-08-12 NOTE — COUNSELING
[Fall prevention counseling provided] : Fall prevention counseling provided [Behavioral health counseling provided] : Behavioral health counseling provided [Potential consequences of obesity discussed] : Potential consequences of obesity discussed [Encouraged to maintain food diary] : Encouraged to maintain food diary [Encouraged to increase physical activity] : Encouraged to increase physical activity [Target Wt Loss Goal ___] : Weight Loss Goals: Target weight loss goal [unfilled] lbs [____ min/wk Activity] : [unfilled] min/wk activity [None] : None [Good understanding] : Patient has a good understanding of lifestyle changes and steps needed to achieve self management goal [FreeTextEntry4] : 15

## 2021-08-13 ENCOUNTER — NON-APPOINTMENT (OUTPATIENT)
Age: 46
End: 2021-08-13

## 2021-08-31 ENCOUNTER — APPOINTMENT (OUTPATIENT)
Dept: PLASTIC SURGERY | Facility: CLINIC | Age: 46
End: 2021-08-31
Payer: COMMERCIAL

## 2021-08-31 VITALS
HEIGHT: 62 IN | OXYGEN SATURATION: 97 % | DIASTOLIC BLOOD PRESSURE: 84 MMHG | BODY MASS INDEX: 33.68 KG/M2 | TEMPERATURE: 97.8 F | WEIGHT: 183 LBS | SYSTOLIC BLOOD PRESSURE: 130 MMHG | HEART RATE: 77 BPM

## 2021-08-31 PROCEDURE — 99214 OFFICE O/P EST MOD 30 MIN: CPT

## 2021-09-02 RX ORDER — HYDROCORTISONE 25 MG/G
2.5 CREAM TOPICAL
Qty: 30 | Refills: 0 | Status: ACTIVE | COMMUNITY
Start: 2021-06-18

## 2021-09-06 NOTE — PHYSICAL EXAM
[NI] : Normal [de-identified] : 1 cm x 1 cm soft, tender mass proximal to old forehead incision, no overlying skin changes

## 2021-09-06 NOTE — ASSESSMENT
[FreeTextEntry1] : The patient is scheduled for neoplasm extirpation.  I explained that following extirpation there will be a full thickness defect of the involved area.  The reconstructive options will be based on the defect size and surrounding tissue laxity of the involved area.  Primary closure is only possible for smaller defects.  For larger defects, local tissue rearrangement or skin grafting may be necessary.  The patient was explained the risks of each option. Risks following layered primary closure or local tissue rearrangement include wound dehiscence, contour irregularity, bleeding, infection, and parasthesia.  Risks following skin grafting include wound dehiscence, skin graft nonadherence (partial or complete), contour irregularity, bleeding, infection, parasthesia, and donor site complications.  \par \par The patient understands all the risks and has provided informed consent.\par \par \par

## 2021-09-06 NOTE — HISTORY OF PRESENT ILLNESS
[FreeTextEntry1] : Ms. TANNER ORTIZ is a 46 year old female with past medical history of hypothyroidism who presents to discuss forehead mass which has been present for many months.  Pt states she previously had a forehead mass excised 8/2020 and soon after the surgery she noticed a new mass near the incision.  Over the last year the mass has been slowly growing and becoming increasingly more painful.\par \par Pt is here to discuss excision\par non smoker, no anticoagulation or blood thinners\par

## 2021-11-04 ENCOUNTER — APPOINTMENT (OUTPATIENT)
Dept: BREAST CENTER | Facility: CLINIC | Age: 46
End: 2021-11-04
Payer: COMMERCIAL

## 2021-11-04 ENCOUNTER — APPOINTMENT (OUTPATIENT)
Dept: SURGERY | Facility: CLINIC | Age: 46
End: 2021-11-04

## 2021-11-04 VITALS
HEART RATE: 93 BPM | OXYGEN SATURATION: 97 % | BODY MASS INDEX: 34.96 KG/M2 | WEIGHT: 190 LBS | TEMPERATURE: 98 F | SYSTOLIC BLOOD PRESSURE: 143 MMHG | DIASTOLIC BLOOD PRESSURE: 90 MMHG | HEIGHT: 62 IN

## 2021-11-04 PROCEDURE — 99214 OFFICE O/P EST MOD 30 MIN: CPT

## 2021-11-04 NOTE — HISTORY OF PRESENT ILLNESS
[FreeTextEntry1] : I had the pleasure of seeing Aurelia Guajardo in the office for a follow up visit secondary to fibroadenomatous and chronic right breast pain. \par \par Aurelia is a george 45 yo premenopausal female who was initially seen secondary to complaint of right breast pain and "lump" and on clinical examination demonstrated to have a well circumscribed 1 cm nodule of the right breast.  She then underwent core needle biopsy under ultrasound guidance. Upon review of bilateral breast imaging, the recommendation was made for bilateral breast ultrasound guided core biopsy of a well circumscribed nodule in both right and left breast.  Biopsy demonstrated a right breast 12:00 Fibroadenoma and left breast 7:00 Fibroadenoma.\par \par Today she denies new dominant breast mass but states that she has persistent discomfort at the site of the biopsy proven right breast fibroadenoma at the 12:00 position. Of note, she has evidence of bilateral breast fibroadenoma after confirming with core needle biopsy under ultrasound guidance.\par \par Pathology 1/26/2017 \par Right breast 9mm nodule at the 12:00 position: Fibroadenoma without atypia\par Left breast 1cm nodule at the 7:00 position: Fibroadenoma without atypia\par \par 2/6/2021  US breast complete BI/ Mammo diag w [facility]\par No mammographic evidence of malignancy. Indeterminate mass in the right breast at the 3:00 location. Ultrasound-guided core biopsy is advised.  No suspicious interval change in the upper right breast at area of pain. Recommend management on clinical grounds.  Multiple bilateral similar-appearing stable masses, including bilateral biopsy-proven fibroadenomas. Recommend follow-up ultrasound evaluation in one year.\par RECOMMENDATION: Ultrasound biopsy.\par BI-RADS 4A -Suspicious Finding(s) -Low Suspicion for Malignancy\par \par 2/18/2021  Pathology\par RIGHT BREAST, '3:00, 4 CM FROM THE NIPPLE', ULTRASOUND GUIDED CORE NEEDLE BIOPSY:\par -FIBROADENOMA WITH USUAL DUCT HYPERPLASIA.\par -DENSE STROMAL FIBROSIS WITH APOCRINE METAPLASIA.\par IMPRESSION: Results are BENIGN AND CONCORDANT.\par RECOMMENDATION: Ultrasound in 1 year.\par \par We discussed the findings on imaging as well as biopsy.  She understands that fibroadenoma with out atypia is benign and we reviewed indications for excisional biopsy which is size >2cm, atypia, rapid growth over short interval, pain/patient preference.  She understands that recommendation is for clinical observation of right breast 3:00 1 cm fibroadenoma.  We also discussed treatment for mastodynia again and evening primrose oil was sent to her pharmacy.  Recommendation for screening mammogram and sonogram in February, lifestyle modifications/medication for mastodynia, and followup in 6 months for clinical breast exam.\par \par She understands and all questions were answered.

## 2021-11-04 NOTE — REASON FOR VISIT
[Follow-Up: _____] : a [unfilled] follow-up visit [[employer] ] : provided by [employer]   [Interpreters_IDNumber] : [id number] [Interpreters_FullName] : Morgan

## 2021-11-04 NOTE — PHYSICAL EXAM
[Normocephalic] : normocephalic [Atraumatic] : atraumatic [PERRL] : pupils equal, round and reactive to light [Sclera nonicteric] : sclera nonicteric [Supple] : supple [No Supraclavicular Adenopathy] : no supraclavicular adenopathy [Examined in the supine and seated position] : examined in the supine and seated position [No dominant masses] : no dominant masses left breast [No Nipple Retraction] : no left nipple retraction [No Nipple Discharge] : no left nipple discharge [Breast Mass Left Breast ___cm] : no masses [No Axillary Lymphadenopathy] : no left axillary lymphadenopathy [No Edema] : no edema [No Rashes] : no rashes [No Ulceration] : no ulceration [Breast Nipple Inversion] : nipples not inverted [Breast Nipple Retraction] : nipples not retracted [Breast Nipple Flattening] : nipples not flattened [Breast Nipple Fissures] : nipples not fissured [Breast Abnormal Lactation (Galactorrhea)] : no galactorrhea [Breast Abnormal Secretion Bloody Fluid] : no bloody discharge [Breast Abnormal Secretion Serous Fluid] : no serous discharge [Breast Abnormal Secretion Opalescent Fluid] : no milky discharge [de-identified] : 12:00-12:30 position there is a 1cm palpable well circumscribed mass which is mobile and unattached to the chest wall.  Right breast 3:00 well circumscribed mass which is mobile and unattached to the chest wall. No other dominant masses.  Everted nipple without discharge.  No skin changes. [de-identified] : No dominant masses, normal to palpation. Everted nipple without discharge. No skin changes.

## 2021-11-04 NOTE — ASSESSMENT
[FreeTextEntry1] : 45 yo premenopausal female with fibroadenomatosis presents for clinical breast exam and review of recent right breast 3:00 4 cmfn 1 cm biopsy proven fibroadenoma.  The fibroadenoma is palpable.   We also discussed treatment for mastodynia again and evening primrose oil was sent to her pharmacy.  Recommendation for screening mammogram and sonogram in February, lifestyle modifications/medication for mastodynia, and followup in 6 months for clinical breast exam.\par 1. CBE 5/2022\par 2. Annual mammogram and sonogram 2/2021\par 3.  Evening primrose oil\par \par

## 2021-11-12 ENCOUNTER — APPOINTMENT (OUTPATIENT)
Dept: FAMILY MEDICINE | Facility: CLINIC | Age: 46
End: 2021-11-12
Payer: COMMERCIAL

## 2021-11-12 VITALS
BODY MASS INDEX: 33.68 KG/M2 | HEART RATE: 82 BPM | DIASTOLIC BLOOD PRESSURE: 80 MMHG | OXYGEN SATURATION: 98 % | HEIGHT: 62 IN | WEIGHT: 183 LBS | RESPIRATION RATE: 16 BRPM | TEMPERATURE: 98 F | SYSTOLIC BLOOD PRESSURE: 126 MMHG

## 2021-11-12 DIAGNOSIS — I83.93 ASYMPTOMATIC VARICOSE VEINS OF BILATERAL LOWER EXTREMITIES: ICD-10-CM

## 2021-11-12 PROCEDURE — 83036 HEMOGLOBIN GLYCOSYLATED A1C: CPT | Mod: QW

## 2021-11-12 PROCEDURE — 99214 OFFICE O/P EST MOD 30 MIN: CPT | Mod: 25

## 2021-11-12 NOTE — HISTORY OF PRESENT ILLNESS
[FreeTextEntry1] : DM f/up\par Flu shot [de-identified] : 47 y/o HF, w/ hx Breast Fibroadenoma, GERD on Famotidine, Uncontrolled DM on metformin,  Amaryl and Actos; Hypothyroidism on levotyroxine, HTN on lisinopril.\par last HbA1c:6.8\par  Seen by GI.GERd symptoms control with omeprazole and Famotidine.\par hepatomegaly, fatty liver.\par She is seen by Breast surgeon, Dr barnes. She had a recent mammo and breast Bxp in 02/2021 with clip placement. She was recently seen by Breast surgery for Mastodynia and Breast Fibroadenoma. She is now on Primrose oil and states is doing better.\par She had a recent gyn exam with Dr Emelina Carney in [place].\par Last eye exam 03/2021\par Had 2 doses Covid vaccine.\par \par  \par  ****ATTENDING**** 70yo male hx CAD< DM, HTN, PAD BIB daughter for LLE pain. Pt states he had a vascular procedure done by Dr. Yolie Hughes 7th at [facility] for decreased flow in extremity. Pt has had persistent symptoms of pain to his 1st toe and spoke to surgeon who recommended amputation. Pt saw Dr. Gregg who sent her in for procedure. Pt is on amoxicillin outpatient. Denies fever or chills.  On exam, Patient is awake,alert,oriented x 3. Patient is well appearing and in no acute distress. Patient's chest is clear to ausculation, +s1s2. Abdomen is soft nd/nt +BS. LLE + medial post surgical healing. L 1st toe with erythema and tenderness extending to the leg.   Check Labs, Cultures, pod/vascular consult. Pain control.

## 2021-11-12 NOTE — PHYSICAL EXAM
[Well Nourished] : well nourished [Well Developed] : well developed [Well-Appearing] : well-appearing [Normal Sclera/Conjunctiva] : normal sclera/conjunctiva [PERRL] : pupils equal round and reactive to light [EOMI] : extraocular movements intact [Normal Outer Ear/Nose] : the outer ears and nose were normal in appearance [Normal Oropharynx] : the oropharynx was normal [No JVD] : no jugular venous distention [No Lymphadenopathy] : no lymphadenopathy [Supple] : supple [Thyroid Normal, No Nodules] : the thyroid was normal and there were no nodules present [No Respiratory Distress] : no respiratory distress  [No Accessory Muscle Use] : no accessory muscle use [Clear to Auscultation] : lungs were clear to auscultation bilaterally [Normal Rate] : normal rate  [Regular Rhythm] : with a regular rhythm [Normal S1, S2] : normal S1 and S2 [No Murmur] : no murmur heard [No Carotid Bruits] : no carotid bruits [No Abdominal Bruit] : a ~M bruit was not heard ~T in the abdomen [No Varicosities] : no varicosities [Pedal Pulses Present] : the pedal pulses are present [No Edema] : there was no peripheral edema [No Palpable Aorta] : no palpable aorta [No Extremity Clubbing/Cyanosis] : no extremity clubbing/cyanosis [Soft] : abdomen soft [Non-distended] : non-distended [No Masses] : no abdominal mass palpated [No HSM] : no HSM [Normal Bowel Sounds] : normal bowel sounds [Normal Posterior Cervical Nodes] : no posterior cervical lymphadenopathy [Normal Anterior Cervical Nodes] : no anterior cervical lymphadenopathy [No CVA Tenderness] : no CVA  tenderness [No Spinal Tenderness] : no spinal tenderness [No Joint Swelling] : no joint swelling [Grossly Normal Strength/Tone] : grossly normal strength/tone [No Rash] : no rash [Coordination Grossly Intact] : coordination grossly intact [No Focal Deficits] : no focal deficits [Normal Gait] : normal gait [Normal Affect] : the affect was normal [Normal Insight/Judgement] : insight and judgment were intact [de-identified] : tenderness in LUQ

## 2021-11-12 NOTE — ASSESSMENT
[FreeTextEntry1] : CPE: 02/20\par HIV: screening: negative 2018\par \par Mammo: 02/2021.S/P Breast bxp.Fibroadenoma-Mastodynia\par -Doing well on Primrose oil.\par F/up  w/ Dr Coon\par \par Hypothyroidism: On levothyroxine. \par \par GERD: \par On omeprazole and famotidine.\par F/up with Dr Shah.\par \par  DM:Dx 7/2018: Education and counseling was given to pt with diet guidance. ,\par  HbA1c: 6.5%\par -Continue metformin 1000mg BID.\par -Continue glimepiride 4mg daily.\par -Continue Pioglitazone 30mg\par -Risks and benefits d/w pt.\par -F/up in 3 months.\par -Seen by Dietitian.\par -Ophthalmology > 05/2021\par \par HTN:\par on Lisinopril 5 mg\par \par Chronic LUQ pain:\par -CT done 04/20> normal\par -USG 08/20: Fatty liver/ hepatomegaly\par -GI f/up\par \par F/up in 3 months.\par \par

## 2021-12-06 ENCOUNTER — APPOINTMENT (OUTPATIENT)
Dept: VASCULAR SURGERY | Facility: CLINIC | Age: 46
End: 2021-12-06
Payer: COMMERCIAL

## 2021-12-06 VITALS
HEIGHT: 60.5 IN | HEART RATE: 79 BPM | RESPIRATION RATE: 16 BRPM | DIASTOLIC BLOOD PRESSURE: 87 MMHG | SYSTOLIC BLOOD PRESSURE: 127 MMHG | BODY MASS INDEX: 34.81 KG/M2 | TEMPERATURE: 97.5 F | OXYGEN SATURATION: 96 % | WEIGHT: 182 LBS

## 2021-12-06 DIAGNOSIS — N63.15 UNSP LUMP IN THE RT BREAST, OVERLAPPING QUADRANTS: ICD-10-CM

## 2021-12-06 PROCEDURE — 99202 OFFICE O/P NEW SF 15 MIN: CPT

## 2021-12-06 PROCEDURE — 99212 OFFICE O/P EST SF 10 MIN: CPT

## 2021-12-06 PROCEDURE — 93970 EXTREMITY STUDY: CPT

## 2021-12-06 NOTE — HISTORY OF PRESENT ILLNESS
[FreeTextEntry1] : Area of  right  ;ayeral calf with spider vein She is  concerned  has occassional pain not  intense  
[place]

## 2021-12-06 NOTE — PHYSICAL EXAM
[2+] : left 2+ [Ankle Swelling (On Exam)] : not present [Varicose Veins Of Lower Extremities] : not present [] : not present [FreeTextEntry1] : spider veins  one  small area

## 2021-12-06 NOTE — ASSESSMENT
[FreeTextEntry1] : No varicosities  Has  one area of spider veins  cosmetic  i wxplained to her through   it is not health  hazard  it is  cosmetic  Sclero  is possible  with  out of pocket   expenses  insurance does not  cover  NO DVT or  SVT  or  Reflux  noted on US  Irf she  wants  sclero  in the  future she  can  make  an appointment

## 2022-01-14 ENCOUNTER — APPOINTMENT (OUTPATIENT)
Dept: GASTROENTEROLOGY | Facility: CLINIC | Age: 47
End: 2022-01-14
Payer: COMMERCIAL

## 2022-01-14 VITALS
RESPIRATION RATE: 14 BRPM | TEMPERATURE: 98.1 F | HEART RATE: 82 BPM | HEIGHT: 60 IN | WEIGHT: 185 LBS | BODY MASS INDEX: 36.32 KG/M2 | SYSTOLIC BLOOD PRESSURE: 128 MMHG | OXYGEN SATURATION: 98 % | DIASTOLIC BLOOD PRESSURE: 82 MMHG

## 2022-01-14 DIAGNOSIS — E66.9 OBESITY, UNSPECIFIED: ICD-10-CM

## 2022-01-14 PROCEDURE — 99214 OFFICE O/P EST MOD 30 MIN: CPT

## 2022-01-14 NOTE — ASSESSMENT
[FreeTextEntry1] : At this time she is doing well and we will simply continue with the same medical regimen.  She will obtain a CBC, basic metabolic profile, prothrombin time, magnesium and B12 level.  She will be seen again in 1 year for further follow-up.

## 2022-01-14 NOTE — HISTORY OF PRESENT ILLNESS
[de-identified] : The patient continues to take omeprazole 40 mg p.o. every morning and famotidine 40 mg every evening for her LA class A reflux esophagitis.  She denies any abdominal pain, nausea or vomiting.  There is no heartburn or dysphagia.  Her appetite and weight are stable.  There is no diarrhea, constipation, rectal bleeding or melena.

## 2022-01-14 NOTE — PHYSICAL EXAM
[Sclera] : the sclera and conjunctiva were normal [PERRL With Normal Accommodation] : pupils were equal in size, round, and reactive to light [Extraocular Movements] : extraocular movements were intact [Outer Ear] : the ears and nose were normal in appearance [Hearing Threshold Finger Rub Not Beaver] : hearing was normal [Examination Of The Oral Cavity] : the lips and gums were normal [Oropharynx] : the oropharynx was normal [Neck Appearance] : the appearance of the neck was normal [Heart Rate And Rhythm] : heart rate was normal and rhythm regular [Bowel Sounds] : normal bowel sounds [Abdomen Soft] : soft [Abdomen Tenderness] : non-tender [Abdomen Mass (___ Cm)] : no abdominal mass palpated [No CVA Tenderness] : no ~M costovertebral angle tenderness [No Spinal Tenderness] : no spinal tenderness [Abnormal Walk] : normal gait [Nail Clubbing] : no clubbing  or cyanosis of the fingernails [Musculoskeletal - Swelling] : no joint swelling seen [Motor Tone] : muscle strength and tone were normal [Skin Color & Pigmentation] : normal skin color and pigmentation [Skin Turgor] : normal skin turgor [] : no rash [Motor Exam] : the motor exam was normal [No Focal Deficits] : no focal deficits [Oriented To Time, Place, And Person] : oriented to person, place, and time [Impaired Insight] : insight and judgment were intact [Affect] : the affect was normal [FreeTextEntry1] :  a physical exam was not performed as this was a telephonic visit

## 2022-02-18 ENCOUNTER — APPOINTMENT (OUTPATIENT)
Dept: FAMILY MEDICINE | Facility: CLINIC | Age: 47
End: 2022-02-18
Payer: COMMERCIAL

## 2022-02-18 VITALS
HEIGHT: 60 IN | SYSTOLIC BLOOD PRESSURE: 128 MMHG | TEMPERATURE: 97.2 F | RESPIRATION RATE: 16 BRPM | BODY MASS INDEX: 36.2 KG/M2 | OXYGEN SATURATION: 98 % | HEART RATE: 77 BPM | DIASTOLIC BLOOD PRESSURE: 72 MMHG | WEIGHT: 184.38 LBS

## 2022-02-18 DIAGNOSIS — J30.2 OTHER SEASONAL ALLERGIC RHINITIS: ICD-10-CM

## 2022-02-18 LAB — HBA1C MFR BLD HPLC: 6.6

## 2022-02-18 PROCEDURE — 83036 HEMOGLOBIN GLYCOSYLATED A1C: CPT | Mod: QW

## 2022-02-18 PROCEDURE — 0013A: CPT

## 2022-02-18 PROCEDURE — 90686 IIV4 VACC NO PRSV 0.5 ML IM: CPT

## 2022-02-18 PROCEDURE — 99396 PREV VISIT EST AGE 40-64: CPT | Mod: 25

## 2022-02-18 PROCEDURE — G0008: CPT

## 2022-02-18 RX ORDER — OMEPRAZOLE 40 MG/1
40 CAPSULE, DELAYED RELEASE ORAL
Qty: 30 | Refills: 5 | Status: DISCONTINUED | COMMUNITY
Start: 2020-12-02 | End: 2022-02-18

## 2022-02-18 RX ORDER — PIOGLITAZONE HYDROCHLORIDE 30 MG/1
30 TABLET ORAL
Qty: 90 | Refills: 1 | Status: DISCONTINUED | COMMUNITY
Start: 2020-10-23 | End: 2022-02-18

## 2022-02-18 RX ORDER — OMEPRAZOLE 40 MG/1
40 CAPSULE, DELAYED RELEASE ORAL
Qty: 30 | Refills: 5 | Status: DISCONTINUED | COMMUNITY
Start: 2021-05-14 | End: 2022-02-18

## 2022-02-18 RX ORDER — FAMOTIDINE 40 MG/1
40 TABLET, FILM COATED ORAL DAILY
Qty: 90 | Refills: 1 | Status: DISCONTINUED | COMMUNITY
Start: 2021-05-14 | End: 2022-02-18

## 2022-02-18 NOTE — ASSESSMENT
[FreeTextEntry1] : CPE: blood and UA today\par HIV: screening: negative 2018\par \par Mammo: 02/2021.S/P Breast bxp.Fibroadenoma-Mastodynia\par -Doing well on Primrose oil.\par F/up w/ Dr Coon\par Gyn: with Emelina Carney in [place].\par \par \par Hypothyroidism: On levothyroxine. TSH today.\par \par GERD: doing better. \par On omeprazole.\par Schedule for EGD with Dr Shah.\par \par  DM:Dx 7/2018: Education and counseling was given to pt with diet guidance. ,\par  HbA1c:6.6\par -Continue metformin 1000mg BID.\par -Continue glimepiride 4mg daily.\par -D/C Pioglitazone\par -Risks and benefits d/w pt.\par -F/up in 3 months.\par -Refused Dietitian and endocrinology referral. Never done\par -Ophthalmology eval done as per pt.\par -Microalbumin urine order\par \par HTN:\par start Lisinopril 5 mg\par

## 2022-02-18 NOTE — HISTORY OF PRESENT ILLNESS
[FreeTextEntry1] : Annual physical [de-identified] : 47 y/o HF, w/ hx Breast Fibroadenoma, GERD on Famotidine, better controlled DM on metformin, Amaryl and Actos; Hypothyroidism on levotyroxine, HTN on lisinopril.\par last HbA1c:6.8\par  Seen by GI.GERd symptoms control with omeprazole and Famotidine.\par hepatomegaly, fatty liver.\par She is seen by Breast surgeon, Dr barnes. She had a recent mammo and breast Bxp in 02/2021 with clip placement. She was recently seen by Breast surgery for Mastodynia and Breast Fibroadenoma. She is now on Primrose oil and states is doing better.\par Gyn exam with Dr Emelina Carney in [place].\par Last eye exam 03/2021\par Had 2 doses Covid vaccine.\par

## 2022-02-18 NOTE — PHYSICAL EXAM

## 2022-02-18 NOTE — HEALTH RISK ASSESSMENT
[Good] : ~his/her~  mood as  good [No] : In the past 12 months have you used drugs other than those required for medical reasons? No [No falls in past year] : Patient reported no falls in the past year [0] : 2) Feeling down, depressed, or hopeless: Not at all (0) [No Retinopathy] : No retinopathy [Patient reported mammogram was normal] : Patient reported mammogram was normal [Patient reported PAP Smear was normal] : Patient reported PAP Smear was normal [HIV test declined] : HIV test declined [None] : None [With Family] : lives with family [Employed] : employed [] :  [# Of Children ___] : has [unfilled] children [Sexually Active] : sexually active [Feels Safe at Home] : Feels safe at home [Fully functional (bathing, dressing, toileting, transferring, walking, feeding)] : Fully functional (bathing, dressing, toileting, transferring, walking, feeding) [Fully functional (using the telephone, shopping, preparing meals, housekeeping, doing laundry, using] : Fully functional and needs no help or supervision to perform IADLs (using the telephone, shopping, preparing meals, housekeeping, doing laundry, using transportation, managing medications and managing finances) [Smoke Detector] : smoke detector [Seat Belt] :  uses seat belt [Never] : Never [de-identified] : no [de-identified] : no sugar/ low cabs [EyeExamDate] : 2019 [Change in mental status noted] : No change in mental status noted [Language] : denies difficulty with language [Reports changes in hearing] : Reports no changes in hearing [Reports changes in vision] : Reports no changes in vision [Reports changes in dental health] : Reports no changes in dental health [MammogramDate] : 03/19 [PapSmearDate] : 2020 [PapSmearComments] : Gyn: Dr Emelina Carney [HIVDate] : 2018 [HIVComments] : negative [HepatitisCComments] : 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

## 2022-02-23 LAB
25(OH)D3 SERPL-MCNC: 22.5 NG/ML
ALBUMIN SERPL ELPH-MCNC: 4.5 G/DL
ALP BLD-CCNC: 64 U/L
ALT SERPL-CCNC: 48 U/L
ANION GAP SERPL CALC-SCNC: 13 MMOL/L
APPEARANCE: CLEAR
AST SERPL-CCNC: 28 U/L
BASOPHILS # BLD AUTO: 0.06 K/UL
BASOPHILS NFR BLD AUTO: 0.6 %
BILIRUB SERPL-MCNC: 0.2 MG/DL
BILIRUBIN URINE: NEGATIVE
BLOOD URINE: NEGATIVE
BUN SERPL-MCNC: 11 MG/DL
CALCIUM SERPL-MCNC: 9.5 MG/DL
CHLORIDE SERPL-SCNC: 105 MMOL/L
CHOLEST SERPL-MCNC: 197 MG/DL
CO2 SERPL-SCNC: 23 MMOL/L
COLOR: YELLOW
CREAT SERPL-MCNC: 0.65 MG/DL
CREAT SPEC-SCNC: 97 MG/DL
CREAT/PROT UR: 0.1 RATIO
EOSINOPHIL # BLD AUTO: 0.33 K/UL
EOSINOPHIL NFR BLD AUTO: 3.2 %
GLUCOSE QUALITATIVE U: NEGATIVE
GLUCOSE SERPL-MCNC: 92 MG/DL
HCT VFR BLD CALC: 41.1 %
HDLC SERPL-MCNC: 48 MG/DL
HGB BLD-MCNC: 12.7 G/DL
IMM GRANULOCYTES NFR BLD AUTO: 0.2 %
KETONES URINE: NEGATIVE
LDLC SERPL CALC-MCNC: 123 MG/DL
LEUKOCYTE ESTERASE URINE: NEGATIVE
LYMPHOCYTES # BLD AUTO: 3.3 K/UL
LYMPHOCYTES NFR BLD AUTO: 31.8 %
MAN DIFF?: NORMAL
MCHC RBC-ENTMCNC: 25 PG
MCHC RBC-ENTMCNC: 30.9 GM/DL
MCV RBC AUTO: 80.9 FL
MONOCYTES # BLD AUTO: 0.69 K/UL
MONOCYTES NFR BLD AUTO: 6.6 %
NEUTROPHILS # BLD AUTO: 5.98 K/UL
NEUTROPHILS NFR BLD AUTO: 57.6 %
NITRITE URINE: NEGATIVE
NONHDLC SERPL-MCNC: 148 MG/DL
PH URINE: 5.5
PLATELET # BLD AUTO: 391 K/UL
POTASSIUM SERPL-SCNC: 4.3 MMOL/L
PROT SERPL-MCNC: 7 G/DL
PROT UR-MCNC: 10 MG/DL
PROTEIN URINE: NEGATIVE
RBC # BLD: 5.08 M/UL
RBC # FLD: 15.4 %
SODIUM SERPL-SCNC: 141 MMOL/L
SPECIFIC GRAVITY URINE: 1.02
TRIGL SERPL-MCNC: 127 MG/DL
TSH SERPL-ACNC: 3.07 UIU/ML
UROBILINOGEN URINE: NORMAL
WBC # FLD AUTO: 10.38 K/UL

## 2022-03-03 ENCOUNTER — NON-APPOINTMENT (OUTPATIENT)
Age: 47
End: 2022-03-03

## 2022-04-05 ENCOUNTER — APPOINTMENT (OUTPATIENT)
Dept: ULTRASOUND IMAGING | Facility: CLINIC | Age: 47
End: 2022-04-05
Payer: COMMERCIAL

## 2022-04-05 ENCOUNTER — APPOINTMENT (OUTPATIENT)
Dept: MAMMOGRAPHY | Facility: CLINIC | Age: 47
End: 2022-04-05
Payer: COMMERCIAL

## 2022-04-05 ENCOUNTER — RESULT REVIEW (OUTPATIENT)
Age: 47
End: 2022-04-05

## 2022-04-05 ENCOUNTER — OUTPATIENT (OUTPATIENT)
Dept: OUTPATIENT SERVICES | Facility: HOSPITAL | Age: 47
LOS: 1 days | End: 2022-04-05
Payer: COMMERCIAL

## 2022-04-05 DIAGNOSIS — Z00.00 ENCOUNTER FOR GENERAL ADULT MEDICAL EXAMINATION WITHOUT ABNORMAL FINDINGS: ICD-10-CM

## 2022-04-05 PROCEDURE — 77063 BREAST TOMOSYNTHESIS BI: CPT | Mod: 26

## 2022-04-05 PROCEDURE — 76641 ULTRASOUND BREAST COMPLETE: CPT | Mod: 26,50

## 2022-04-05 PROCEDURE — 76641 ULTRASOUND BREAST COMPLETE: CPT

## 2022-04-05 PROCEDURE — 77063 BREAST TOMOSYNTHESIS BI: CPT

## 2022-04-05 PROCEDURE — 77067 SCR MAMMO BI INCL CAD: CPT | Mod: 26

## 2022-04-05 PROCEDURE — 77067 SCR MAMMO BI INCL CAD: CPT

## 2022-04-07 ENCOUNTER — APPOINTMENT (OUTPATIENT)
Dept: FAMILY MEDICINE | Facility: CLINIC | Age: 47
End: 2022-04-07
Payer: COMMERCIAL

## 2022-04-07 VITALS
WEIGHT: 191 LBS | SYSTOLIC BLOOD PRESSURE: 118 MMHG | DIASTOLIC BLOOD PRESSURE: 78 MMHG | HEART RATE: 88 BPM | TEMPERATURE: 98.1 F | RESPIRATION RATE: 16 BRPM | HEIGHT: 60 IN | OXYGEN SATURATION: 99 % | BODY MASS INDEX: 37.5 KG/M2

## 2022-04-07 DIAGNOSIS — R05.3 CHRONIC COUGH: ICD-10-CM

## 2022-04-07 DIAGNOSIS — R05.8 OTHER SPECIFIED COUGH: ICD-10-CM

## 2022-04-07 PROCEDURE — 99213 OFFICE O/P EST LOW 20 MIN: CPT

## 2022-04-07 NOTE — HISTORY OF PRESENT ILLNESS
[FreeTextEntry8] : Pt presents for acute visit c/o continued dry cough on / off for the past 2-3 months, has been given Cetirizine with partial improvement, pt states that she feels her mouth and throat dry, her heat at home has been shut off and cough continues, denies any sick contacts at home, denies any wheezing.

## 2022-04-07 NOTE — REVIEW OF SYSTEMS
[Shortness Of Breath] : no shortness of breath [Wheezing] : no wheezing [Cough] : cough [Negative] : Neurological

## 2022-04-07 NOTE — ASSESSMENT
[FreeTextEntry1] : Presents with persistent cough:\par \par Viral vs Allergy in nature\par -Physical exam unremarkable\par -Medrol dose pack, Tessalon caps e-prescribed\par -Continue Cetirizine\par -Less likely ACE driven cough as is on / off

## 2022-04-07 NOTE — HEALTH RISK ASSESSMENT
[Never] : Never [No] : In the past 12 months have you used drugs other than those required for medical reasons? No [No falls in past year] : Patient reported no falls in the past year [0] : 2) Feeling down, depressed, or hopeless: Not at all (0) [de-identified] : no [de-identified] : no sugar/ low cabs [Patient reported mammogram was normal] : Patient reported mammogram was normal [Patient reported PAP Smear was normal] : Patient reported PAP Smear was normal [HIV test declined] : HIV test declined [Change in mental status noted] : No change in mental status noted [Language] : denies difficulty with language [None] : None [With Family] : lives with family [Employed] : employed [] :  [# Of Children ___] : has [unfilled] children [Sexually Active] : sexually active [Feels Safe at Home] : Feels safe at home [Fully functional (bathing, dressing, toileting, transferring, walking, feeding)] : Fully functional (bathing, dressing, toileting, transferring, walking, feeding) [Fully functional (using the telephone, shopping, preparing meals, housekeeping, doing laundry, using] : Fully functional and needs no help or supervision to perform IADLs (using the telephone, shopping, preparing meals, housekeeping, doing laundry, using transportation, managing medications and managing finances) [Reports changes in hearing] : Reports no changes in hearing [Reports changes in vision] : Reports no changes in vision [Reports changes in dental health] : Reports no changes in dental health [Smoke Detector] : smoke detector [Seat Belt] :  uses seat belt [MammogramDate] : 03/19 [PapSmearDate] : 2020 [PapSmearComments] : Gyn: Dr Emelina Carney [HIVDate] : 2018 [HIVComments] : negative [HepatitisCComments] : 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

## 2022-05-16 ENCOUNTER — APPOINTMENT (OUTPATIENT)
Dept: DERMATOLOGY | Facility: CLINIC | Age: 47
End: 2022-05-16
Payer: COMMERCIAL

## 2022-05-16 PROCEDURE — 99213 OFFICE O/P EST LOW 20 MIN: CPT

## 2022-05-18 ENCOUNTER — APPOINTMENT (OUTPATIENT)
Dept: FAMILY MEDICINE | Facility: CLINIC | Age: 47
End: 2022-05-18
Payer: COMMERCIAL

## 2022-05-18 ENCOUNTER — RESULT CHARGE (OUTPATIENT)
Age: 47
End: 2022-05-18

## 2022-05-18 VITALS
DIASTOLIC BLOOD PRESSURE: 78 MMHG | WEIGHT: 184 LBS | SYSTOLIC BLOOD PRESSURE: 122 MMHG | BODY MASS INDEX: 36.12 KG/M2 | RESPIRATION RATE: 14 BRPM | TEMPERATURE: 97.2 F | OXYGEN SATURATION: 97 % | HEIGHT: 60 IN | HEART RATE: 80 BPM

## 2022-05-18 LAB — HBA1C MFR BLD HPLC: 7.3

## 2022-05-18 PROCEDURE — 99214 OFFICE O/P EST MOD 30 MIN: CPT | Mod: 25

## 2022-05-18 PROCEDURE — 83036 HEMOGLOBIN GLYCOSYLATED A1C: CPT | Mod: QW

## 2022-05-18 RX ORDER — CHROMIUM 200 MCG
500 TABLET ORAL
Qty: 180 | Refills: 6 | Status: DISCONTINUED | COMMUNITY
Start: 2019-12-26 | End: 2022-05-18

## 2022-05-18 RX ORDER — METHYLPREDNISOLONE 4 MG/1
4 TABLET ORAL
Qty: 1 | Refills: 0 | Status: DISCONTINUED | COMMUNITY
Start: 2022-04-07 | End: 2022-05-18

## 2022-05-18 RX ORDER — CHROMIUM 200 MCG
500 TABLET ORAL
Qty: 180 | Refills: 6 | Status: DISCONTINUED | COMMUNITY
Start: 2021-11-04 | End: 2022-05-18

## 2022-05-18 RX ORDER — BENZOCAINE AND MENTHOL 15; 2.6 MG/1; MG/1
15-2.6 LOZENGE ORAL
Qty: 1 | Refills: 1 | Status: DISCONTINUED | COMMUNITY
Start: 2022-04-07 | End: 2022-05-18

## 2022-05-18 RX ORDER — OMEPRAZOLE 40 MG/1
40 CAPSULE, DELAYED RELEASE ORAL
Qty: 30 | Refills: 5 | Status: DISCONTINUED | COMMUNITY
Start: 2020-02-25 | End: 2022-05-18

## 2022-05-18 RX ORDER — BENZONATATE 200 MG/1
200 CAPSULE ORAL 3 TIMES DAILY
Qty: 30 | Refills: 1 | Status: DISCONTINUED | COMMUNITY
Start: 2022-04-07 | End: 2022-05-18

## 2022-05-18 NOTE — ASSESSMENT
[FreeTextEntry1] : CPE: 02/2022\par HIV: screening: negative 2018\par \par Mammo: 02/2021.S/P Breast bxp.Fibroadenoma-Mastodynia\par -Doing well on Primrose oil.\par F/up w/ Dr Coon\par Gyn: with Emelina Carney in [place].\par \par \par Hypothyroidism: On levothyroxine. TSH today.\par \par GERD: doing better. \par On omeprazole.\par Schedule for EGD with Dr Shah.\par \par  DM:Dx 7/2018: Education and counseling was given to pt with diet guidance. ,\par  HbA1c:6.6\par -Continue metformin 1000mg BID.\par -Continue glimepiride 4mg daily.\par -D/C Pioglitazone\par -Risks and benefits d/w pt.\par -F/up in 3 months.\par -Refused Dietitian and endocrinology referral. Never done\par -Ophthalmology eval done as per pt.\par -Microalbumin urine order\par \par HTN:\par start Lisinopril 5 mg\par

## 2022-05-18 NOTE — HISTORY OF PRESENT ILLNESS
[FreeTextEntry1] : DM f/up [de-identified] : 45 y/o HF, w/ hx Breast Fibroadenoma, GERD on Famotidine, better controlled DM on metformin, Amaryl and Actos; Hypothyroidism on levotyroxine, HTN on lisinopril.\par last HbA1c:6.8\par  Seen by GI.GERd symptoms control with omeprazole and Famotidine.\par hepatomegaly, fatty liver.\par She is seen by Breast surgeon, Dr barnes. She had a recent mammo and breast Bxp in 02/2021 with clip placement. She was recently seen by Breast surgery for Mastodynia and Breast Fibroadenoma. She is now on Primrose oil and states is doing better.\par Gyn exam with Dr Emelina Carney in [place].\par Last eye exam 03/2021\par Had 2 doses Covid vaccine

## 2022-08-18 ENCOUNTER — APPOINTMENT (OUTPATIENT)
Dept: FAMILY MEDICINE | Facility: CLINIC | Age: 47
End: 2022-08-18

## 2022-09-13 ENCOUNTER — APPOINTMENT (OUTPATIENT)
Dept: SURGERY | Facility: CLINIC | Age: 47
End: 2022-09-13

## 2022-09-13 ENCOUNTER — RESULT REVIEW (OUTPATIENT)
Age: 47
End: 2022-09-13

## 2022-09-13 VITALS
TEMPERATURE: 97.4 F | OXYGEN SATURATION: 98 % | WEIGHT: 180 LBS | HEART RATE: 94 BPM | DIASTOLIC BLOOD PRESSURE: 86 MMHG | BODY MASS INDEX: 35.34 KG/M2 | SYSTOLIC BLOOD PRESSURE: 138 MMHG | HEIGHT: 60 IN

## 2022-09-13 PROCEDURE — 99214 OFFICE O/P EST MOD 30 MIN: CPT

## 2022-09-16 ENCOUNTER — APPOINTMENT (OUTPATIENT)
Dept: DERMATOLOGY | Facility: CLINIC | Age: 47
End: 2022-09-16

## 2022-09-24 NOTE — HISTORY OF PRESENT ILLNESS
[FreeTextEntry1] : I had the pleasure of seeing Aurelia Guajardo in the office for a follow up visit secondary to fibroadenomatous and chronic right breast pain. \par \par Aurelia is a george 46 yo premenopausal female who was initially seen secondary to complaint of right breast pain and "lump" and on clinical examination demonstrated to have a well circumscribed 1 cm nodule of the right breast.  She then underwent core needle biopsy under ultrasound guidance. Upon review of bilateral breast imaging, the recommendation was made for bilateral breast ultrasound guided core biopsy of a well circumscribed nodule in both right and left breast.  Biopsy demonstrated a right breast 12:00 Fibroadenoma and left breast 7:00 Fibroadenoma.\par \par Today she denies new dominant breast mass, skin changes or nipple discharge. Of note, she has evidence of bilateral breast fibroadenoma after confirming with core needle biopsy under ultrasound guidance. She also has a history of right breast excisional biopsy in the past. \par \par Pathology 1/26/2017 \par Right breast 9mm nodule at the 12:00 position: Fibroadenoma without atypia\par Left breast 1cm nodule at the 7:00 position: Fibroadenoma without atypia\par \par 2/6/2021  US breast complete BI/ Mammo diag w clara BI\par No mammographic evidence of malignancy. Indeterminate mass in the right breast at the 3:00 location. Ultrasound-guided core biopsy is advised.  No suspicious interval change in the upper right breast at area of pain. Recommend management on clinical grounds.  Multiple bilateral similar-appearing stable masses, including bilateral biopsy-proven fibroadenomas. Recommend follow-up ultrasound evaluation in one year.\par RECOMMENDATION: Ultrasound biopsy.\par BI-RADS 4A -Suspicious Finding(s) -Low Suspicion for Malignancy\par \par 2/18/2021  Pathology\par RIGHT BREAST, '3:00, 4 CM FROM THE NIPPLE', ULTRASOUND GUIDED CORE NEEDLE BIOPSY:\par -FIBROADENOMA WITH USUAL DUCT HYPERPLASIA.\par -DENSE STROMAL FIBROSIS WITH APOCRINE METAPLASIA.\par IMPRESSION: Results are BENIGN AND CONCORDANT.\par RECOMMENDATION: Ultrasound in 1 year.\par \par Imaging at [facility]\par 4/5/22 bilat mammo clara, impression- multiple bilateral US findings, follow up ultrasound in 6 months is advised, Birads 3 probably benign. \par \par \par She understands and all questions were answered.  Recommend follow up  US in October 2022 and clinical breast exam.

## 2022-09-24 NOTE — ASSESSMENT
[FreeTextEntry1] : 46 yo premenopausal female with history of fibroadenomatosis presents for clinical breast exam, most recent imaging demonstrates multiple bilateral US findings, follow up ultrasound in 6 months is advised.\par 1. Follow up bilateral ultrasound October 2022\par 2. Follow up telehealth visit post imaging to discuss results. \par \par

## 2022-09-24 NOTE — PHYSICAL EXAM
[EOMI] : extra ocular movement intact [Breast Nipple Inversion] : nipples not inverted [Breast Nipple Retraction] : nipples not retracted [Breast Nipple Flattening] : nipples not flattened [Breast Nipple Fissures] : nipples not fissured [Breast Abnormal Lactation (Galactorrhea)] : no galactorrhea [Breast Abnormal Secretion Bloody Fluid] : no bloody discharge [Breast Abnormal Secretion Serous Fluid] : no serous discharge [Breast Abnormal Secretion Opalescent Fluid] : no milky discharge [de-identified] : Healed surgical scar. 12:00-12:30 position there is a 1cm palpable well circumscribed mass which is mobile and unattached to the chest wall.  Right breast 3:00 well circumscribed mass which is mobile and unattached to the chest wall. No other dominant masses.  Everted nipple without discharge.  No skin changes. [de-identified] : No dominant masses, normal to palpation. Everted nipple without discharge. No skin changes.

## 2022-10-06 ENCOUNTER — OUTPATIENT (OUTPATIENT)
Dept: OUTPATIENT SERVICES | Facility: HOSPITAL | Age: 47
LOS: 1 days | End: 2022-10-06
Payer: COMMERCIAL

## 2022-10-06 ENCOUNTER — APPOINTMENT (OUTPATIENT)
Dept: ULTRASOUND IMAGING | Facility: CLINIC | Age: 47
End: 2022-10-06

## 2022-10-06 ENCOUNTER — RESULT REVIEW (OUTPATIENT)
Age: 47
End: 2022-10-06

## 2022-10-06 DIAGNOSIS — Z00.8 ENCOUNTER FOR OTHER GENERAL EXAMINATION: ICD-10-CM

## 2022-10-06 PROCEDURE — 76642 ULTRASOUND BREAST LIMITED: CPT | Mod: 26,50

## 2022-10-06 PROCEDURE — 76642 ULTRASOUND BREAST LIMITED: CPT

## 2022-10-12 ENCOUNTER — APPOINTMENT (OUTPATIENT)
Dept: FAMILY MEDICINE | Facility: CLINIC | Age: 47
End: 2022-10-12

## 2022-10-12 VITALS
WEIGHT: 177 LBS | HEIGHT: 60 IN | HEART RATE: 97 BPM | RESPIRATION RATE: 14 BRPM | BODY MASS INDEX: 34.75 KG/M2 | OXYGEN SATURATION: 98 % | SYSTOLIC BLOOD PRESSURE: 130 MMHG | DIASTOLIC BLOOD PRESSURE: 90 MMHG

## 2022-10-12 DIAGNOSIS — M79.672 PAIN IN LEFT FOOT: ICD-10-CM

## 2022-10-12 DIAGNOSIS — Z23 ENCOUNTER FOR IMMUNIZATION: ICD-10-CM

## 2022-10-12 PROCEDURE — 83036 HEMOGLOBIN GLYCOSYLATED A1C: CPT | Mod: QW

## 2022-10-12 PROCEDURE — 99214 OFFICE O/P EST MOD 30 MIN: CPT | Mod: 25

## 2022-10-12 PROCEDURE — G0008: CPT

## 2022-10-12 PROCEDURE — 90686 IIV4 VACC NO PRSV 0.5 ML IM: CPT

## 2022-10-12 PROCEDURE — 36415 COLL VENOUS BLD VENIPUNCTURE: CPT

## 2022-10-12 NOTE — HEALTH RISK ASSESSMENT
[Never] : Never [No] : In the past 12 months have you used drugs other than those required for medical reasons? No [0] : 2) Feeling down, depressed, or hopeless: Not at all (0) [PHQ-2 Negative - No further assessment needed] : PHQ-2 Negative - No further assessment needed [[id number]] : 0

## 2022-10-12 NOTE — ASSESSMENT
[FreeTextEntry1] : CPE: 02/2022\par HIV: screening: negative 2018\par \par Mammo: 02/2021.S/P Breast bxp.Fibroadenoma-Mastodynia\par -Doing well on Primrose oil.\par F/up w/ Dr Coon\par Gyn: with Emelina Angelika in [place].\par \par \par Hypothyroidism: On levothyroxine.\par \par GERD: doing better. \par On omeprazole.\par Schedule for EGD with Dr Shah.\par \par  DM:Dx 7/2018: Education and counseling was given to pt with diet guidance. ,\par  HbA1c:8.0\par -Continue metformin 1000mg BID.\par -Continue glimepiride 4mg daily.\par -Start Ozempic 0.25mg weekly> Advise to come to the office for 1st dose,\par -D/C Pioglitazone\par -Risks and benefits d/w pt.\par -F/up in 2 months.\par -Refused Dietitian and endocrinology referral. Never done\par -Ophthalmology eval done as per pt.\par -Microalbumin urine order\par \par HTN:\par on Lisinopril 5 mg\par \par # Colon cancer screening:\par -GI referral for colonoscopy\par -Genetic referral.\par \par # Immunizations:\par -Flu shot today

## 2022-10-12 NOTE — HISTORY OF PRESENT ILLNESS
[FreeTextEntry1] : DM f/up [de-identified] : 48 y/o HF, w/ hx Breast Fibroadenoma, GERD on Famotidine, better controlled DM on metformin, Amaryl ; Hypothyroidism on levotyroxine, HTN on lisinopril.\par last HbA1c:6.8\par  Seen by GI.GERd symptoms control with omeprazole and Famotidine.\par hepatomegaly, fatty liver.\par She is seen by Breast surgeon, Dr barnes. She had a recent mammo and breast Bxp in 02/2021 with clip placement. She was recently seen by Breast surgery for Mastodynia and Breast Fibroadenoma. She is now on Primrose oil and states is doing better.\par Gyn exam with Dr Emelina Carney in [place].\par Last eye exam 03/2021\par Had 2 doses Covid vaccine\par Concern about recent Positive genetic tests on daughter.

## 2022-10-14 LAB
ALBUMIN SERPL ELPH-MCNC: 4.8 G/DL
ALP BLD-CCNC: 62 U/L
ALT SERPL-CCNC: 87 U/L
ANION GAP SERPL CALC-SCNC: 13 MMOL/L
AST SERPL-CCNC: 52 U/L
BILIRUB SERPL-MCNC: 0.3 MG/DL
BUN SERPL-MCNC: 11 MG/DL
CALCIUM SERPL-MCNC: 9.9 MG/DL
CHLORIDE SERPL-SCNC: 106 MMOL/L
CHOLEST SERPL-MCNC: 220 MG/DL
CO2 SERPL-SCNC: 21 MMOL/L
CREAT SERPL-MCNC: 0.66 MG/DL
EGFR: 110 ML/MIN/1.73M2
GLUCOSE SERPL-MCNC: 126 MG/DL
HDLC SERPL-MCNC: 41 MG/DL
LDLC SERPL CALC-MCNC: 148 MG/DL
NONHDLC SERPL-MCNC: 179 MG/DL
POTASSIUM SERPL-SCNC: 4.7 MMOL/L
PROT SERPL-MCNC: 7.5 G/DL
SODIUM SERPL-SCNC: 140 MMOL/L
TRIGL SERPL-MCNC: 156 MG/DL

## 2022-12-07 ENCOUNTER — APPOINTMENT (OUTPATIENT)
Dept: GASTROENTEROLOGY | Facility: CLINIC | Age: 47
End: 2022-12-07

## 2022-12-07 VITALS
OXYGEN SATURATION: 98 % | HEART RATE: 98 BPM | WEIGHT: 177 LBS | BODY MASS INDEX: 34.75 KG/M2 | HEIGHT: 60 IN | SYSTOLIC BLOOD PRESSURE: 130 MMHG | TEMPERATURE: 98 F | RESPIRATION RATE: 16 BRPM | DIASTOLIC BLOOD PRESSURE: 80 MMHG

## 2022-12-07 DIAGNOSIS — Z12.11 ENCOUNTER FOR SCREENING FOR MALIGNANT NEOPLASM OF COLON: ICD-10-CM

## 2022-12-07 DIAGNOSIS — R79.89 OTHER SPECIFIED ABNORMAL FINDINGS OF BLOOD CHEMISTRY: ICD-10-CM

## 2022-12-07 PROCEDURE — 99214 OFFICE O/P EST MOD 30 MIN: CPT

## 2022-12-07 NOTE — REASON FOR VISIT
[Follow-up] : a follow-up of an existing diagnosis [FreeTextEntry1] : Reflux esophagitis and colon cancer screening

## 2022-12-07 NOTE — ADDENDUM
[FreeTextEntry1] : I, Marisela Villa NP, acted as scribe for ASTRID Parson for this patient encounter.

## 2022-12-07 NOTE — END OF VISIT
[FreeTextEntry3] : In addition to the nurse practitioner I also performed a full history and physical and agree with the assessment and plans

## 2022-12-07 NOTE — PHYSICAL EXAM

## 2022-12-07 NOTE — HISTORY OF PRESENT ILLNESS
[FreeTextEntry1] : TANNER ORTIZ is a 47 year year old female with a PMH significant for DM, HTN, HLD, and Hypothyroidism.\par \par She presents today for colon cancer screening. She has never had a colonoscopy before. She reports a history of constipation. She has a hard BM every 2-3 days. She reports a lot of straining and sometimes notices some blood with wiping after BMs. Denies abdominal pain, melena, dark stools, unintentional weight loss. She has not tried any OTC remedies for this. Denies family history of colon cancer or colon polyps.\par \par Pt reports her acid reflux symptoms have somewhat improved. She takes the Omeprazole 40 mg every morning for her LA class a reflux esophagitis and was supposed to be taking famotidine 40 mg every evening but has apparently not been doing so.  She states symptoms are controlled in the morning but she will start having breakthrough heartburn mid day. Denies nausea, vomiting, change in appetite, hematemesis or dysphagia. \par \par Pt had blood work done with her PCP in October that revealed elevated transaminases levels. AST 52 and ALT 87. Previous to this, she had an elevation of ALT (48) in February but prior LFTs dating back to 2017 were normal. Denies alcohol consumption. Denies OTC/herbal supplement. She started taking Atorvastatin approximately 2 months ago, unclear if this correlates with the elevation. Denies fatigue, malaise, arthralgias, myalgias, pruritus, recent infection, abdominal pain or distension, jaundice, hematemesis, hematochezia, dark urine, confusion, unintentional weight loss or gain. Last imaging on file was an abdominal ultrasound done September 2020 which showed hepatic steatosis.\par \par Denies any significant pulmonary or cardiac conditions.

## 2022-12-07 NOTE — HISTORY OF PRESENT ILLNESS
[FreeTextEntry1] : I had the pleasure of seeing Aurelia Guajardo in the office for a follow up visit to review recent breast imaging.\par \par Aurelia is a george 42 yo premenopausal female who was seen secondary to complaint of right breast pain and "lump" and on clinical examination demonstrated to have a well circumscribed 1cm nodule of the right breast and was referred for core needle biopsy under ultrasound guidance. Upon review of bilateral breast imaging, the recommendation was made for bilateral breast ultrasound guided core biopsy of a well circumscribed nodule in both right and left breast.  Biopsy demonstrated a right breast 12:00 Fibroadenoma and left breast 7:00 Fibroadenoma.\par \par Today she denies new dominant breast mass but states that she has persistent discomfort at the site of the biopsy proven right breast fibroadenoma at the 12:00 position. Of note, she has evidence of bilateral breast fibroadenoma after confirming with core needle biopsy under ultrasound guidance.\par \par Imaging: [facility]\par 3/6/2019  Us breast complete BI/ MG mammo screen w clara BI#:  \par Impression:  No mammographic evidence of malignancy.  No sonographic evidence of malignancy.  Bilateral benign sonographic findings as described above.  BIRADS 2 Benign findings.\par \par \par Pathology 1/26/2017 \par Right breast 9mm nodule at the 12:00 position: Fibroadenoma without atypia\par Left breast 1cm nodule at the 7:00 position: Fibroadenoma without atypia\par \par I reviewed the findings with Aurelia. She understands that mammogram and sonogram showed the right biopsy proven fibroadenoma stable and left breast biopsy proven fibroadenoma has decreased slightly in size since last imaging.  \par \par She understands that the recommendation at this time is for continued observation and bilateral screening mammogram annually. She has multiple nodule/cyst seen on imaging and recommendation for continued observation.  She still continues to complain about pain and was given a script for naproxen 500mg BID PRN. She states she takes the medication 3-4 times a week. Follow up clinical breast exam in 6 months unless anything is to change.\par \par She understands and all questions were answered. 
no

## 2022-12-08 ENCOUNTER — OFFICE (OUTPATIENT)
Dept: URBAN - METROPOLITAN AREA CLINIC 94 | Facility: CLINIC | Age: 47
Setting detail: OPHTHALMOLOGY
End: 2022-12-08
Payer: COMMERCIAL

## 2022-12-08 DIAGNOSIS — E11.9: ICD-10-CM

## 2022-12-08 DIAGNOSIS — H04.122: ICD-10-CM

## 2022-12-08 DIAGNOSIS — H11.041: ICD-10-CM

## 2022-12-08 DIAGNOSIS — H43.393: ICD-10-CM

## 2022-12-08 DIAGNOSIS — H04.121: ICD-10-CM

## 2022-12-08 PROCEDURE — 83861 MICROFLUID ANALY TEARS: CPT | Performed by: OPHTHALMOLOGY

## 2022-12-08 PROCEDURE — 92014 COMPRE OPH EXAM EST PT 1/>: CPT | Performed by: OPHTHALMOLOGY

## 2022-12-08 ASSESSMENT — CONFRONTATIONAL VISUAL FIELD TEST (CVF)
OS_FINDINGS: FULL
OD_FINDINGS: FULL

## 2022-12-08 ASSESSMENT — TONOMETRY
OD_IOP_MMHG: 13
OS_IOP_MMHG: 15

## 2022-12-08 ASSESSMENT — CORNEAL PTERYGIUM: OD_PTERYGIUM: NASAL

## 2022-12-13 ENCOUNTER — APPOINTMENT (OUTPATIENT)
Dept: SURGERY | Facility: CLINIC | Age: 47
End: 2022-12-13

## 2022-12-13 ENCOUNTER — RESULT REVIEW (OUTPATIENT)
Age: 47
End: 2022-12-13

## 2022-12-13 ENCOUNTER — LABORATORY RESULT (OUTPATIENT)
Age: 47
End: 2022-12-13

## 2022-12-13 VITALS
BODY MASS INDEX: 34.75 KG/M2 | WEIGHT: 177 LBS | HEIGHT: 60 IN | DIASTOLIC BLOOD PRESSURE: 64 MMHG | SYSTOLIC BLOOD PRESSURE: 120 MMHG

## 2022-12-13 DIAGNOSIS — N64.4 MASTODYNIA: ICD-10-CM

## 2022-12-13 DIAGNOSIS — Z80.3 FAMILY HISTORY OF MALIGNANT NEOPLASM OF BREAST: ICD-10-CM

## 2022-12-13 LAB
BASOPHILS # BLD AUTO: 0.05 K/UL
BASOPHILS NFR BLD AUTO: 0.5 %
EOSINOPHIL # BLD AUTO: 0.19 K/UL
EOSINOPHIL NFR BLD AUTO: 1.8 %
HCT VFR BLD CALC: 38.9 %
HGB BLD-MCNC: 12.2 G/DL
IMM GRANULOCYTES NFR BLD AUTO: 0.4 %
INR PPP: 0.93 RATIO
LYMPHOCYTES # BLD AUTO: 3.99 K/UL
LYMPHOCYTES NFR BLD AUTO: 36.8 %
MAN DIFF?: NORMAL
MCHC RBC-ENTMCNC: 23.5 PG
MCHC RBC-ENTMCNC: 31.4 GM/DL
MCV RBC AUTO: 74.8 FL
MONOCYTES # BLD AUTO: 0.75 K/UL
MONOCYTES NFR BLD AUTO: 6.9 %
NEUTROPHILS # BLD AUTO: 5.82 K/UL
NEUTROPHILS NFR BLD AUTO: 53.6 %
PLATELET # BLD AUTO: 412 K/UL
PT BLD: 11 SEC
RBC # BLD: 5.2 M/UL
RBC # FLD: 17 %
WBC # FLD AUTO: 10.84 K/UL

## 2022-12-13 PROCEDURE — 99213 OFFICE O/P EST LOW 20 MIN: CPT

## 2022-12-13 NOTE — PHYSICAL EXAM
[Normocephalic] : normocephalic [Atraumatic] : atraumatic [EOMI] : extra ocular movement intact [PERRL] : pupils equal, round and reactive to light [Sclera nonicteric] : sclera nonicteric [Supple] : supple [No Supraclavicular Adenopathy] : no supraclavicular adenopathy [Examined in the supine and seated position] : examined in the supine and seated position [No dominant masses] : no dominant masses in right breast  [No dominant masses] : no dominant masses left breast [No Nipple Retraction] : no left nipple retraction [No Nipple Discharge] : no left nipple discharge [Breast Mass Left Breast ___cm] : no masses [No Axillary Lymphadenopathy] : no left axillary lymphadenopathy [No Edema] : no edema [No Rashes] : no rashes [No Ulceration] : no ulceration [Breast Nipple Inversion] : nipples not inverted [Breast Nipple Retraction] : nipples not retracted [Breast Nipple Flattening] : nipples not flattened [Breast Nipple Fissures] : nipples not fissured [Breast Abnormal Lactation (Galactorrhea)] : no galactorrhea [Breast Abnormal Secretion Bloody Fluid] : no bloody discharge [Breast Abnormal Secretion Serous Fluid] : no serous discharge [Breast Abnormal Secretion Opalescent Fluid] : no milky discharge [de-identified] : Healed surgical scar. 12:00-12:30 position there is a 1cm palpable well circumscribed mass which is mobile and unattached to the chest wall.  Right breast 3:00 well circumscribed mass which is mobile and unattached to the chest wall. No other dominant masses.  Everted nipple without discharge.  No skin changes. [de-identified] : No dominant masses, normal to palpation. Everted nipple without discharge. No skin changes.

## 2022-12-13 NOTE — HISTORY OF PRESENT ILLNESS
[FreeTextEntry1] : Problem List:\par 1. Bilateral breast fibroadenomas\par     -Right 12:00\par     -Left 7:00 \par 2. Right breast 3:00 4cm FN, biopsy-proven fibroadenoma with usual ductal hyperplasia and dense stromal fibrosis and apocrine metaplasia. \par 3. Family history of breast cancer\par \par \par INTERVAL HISTORY:\par Patient presents today for followup and review of imaging. She reports she has persistent bilateral breast pain. It comes and goes but happens most days. She denies any nipple discharge or skin changes. She also expresses some concern due to a strong family history of breast cancer. \par \par P niece at 22, M cousin x2 at 34, M aunt at 42, and M aunt at 72. \par \par HPI: \par Patient initially saw Dr. Coon in February 2017 for right breast pain and lump. She went for diagnostic imaging demonstrating bilateral breast masses that were ultimately biopsies and found to be fibroadenomas. She has since been undergoing surveillance imaging for bilateral breast masses, birads 3. \par \par IMAGING:\par 04/05/22 - [facility]: Screening Mammogram & US Breast Complete Bilateral - MAMMOGRAM: The breasts are heterogeneously dense, which may obscure small masses. No suspicious mass, suspicious microcalcifications, or other sign of malignancy is identified. There are now 2 biopsy clips in the right breast. A biopsy clip in the left breast is unchanged. Bilateral nodularity is stable. ULTRASOUND: Multiple bilateral ultrasound findings. Follow-up ultrasound in 6 month advised. - BIRADS 3: Probably Benign Finding(s).\par \par 10/06/22 - [facility]: US Breast Limited Bilateral - No significant changes. Continued ultrasound follow-up advised in 6 months, with the patient will also be due for annual bilateral mammogram. - BIRADS 3: Probably Benign Finding(s).\par \par \par

## 2022-12-13 NOTE — ADDENDUM
[FreeTextEntry1] : This note was written by Adri Mai, acting as the  for Dr. Hall. This note accurately reflects the work and decisions made by Dr. Hall.

## 2022-12-13 NOTE — DATA REVIEWED
[No studies available for review at this time.] : No studies available for review at this time. [FreeTextEntry1] : Please see HPI

## 2022-12-13 NOTE — REASON FOR VISIT
[Follow-Up: _____] : a [unfilled] follow-up visit [Time Spent: ____ minutes] : Total time spent using  services: [unfilled] minutes. The patient's primary language is not English thus required  services. [Interpreters_IDNumber] : [id number] [Interpreters_FullName] : Colette

## 2022-12-13 NOTE — ASSESSMENT
[FreeTextEntry1] : 47 year old premenopausal female with history of bilateral fibroadenomatosis with recent imaging demonstrating multiple bilateral US findings, follow up ultrasound in 6 months is advised.\par \par We reviewed her imaging from October 2022. Her known bilateral breast masses are currently stable. She will be due for a repeat breast US in April. She will also be due for her mammogram at that time as well. I will see her back in April after her imaging is complete. \par \par Regarding her breast pain. We discussed other possible etiologies of her breast pain including hormonal shifts with fibrocystic changes and musculoskeletal pain. I recommended wearing a supportive, compressive bra day and night, and to take ibuprofen or Tylenol as needed. Additionally, ice packs or warm compresses can be helpful. We also discussed other lifestyle modifications including decreasing caffeine, salt, and saturated fat consumption. She can also try taking vitamin E, primrose oil or flaxseed/omega acid supplements--all of which are not necessarily supported by medical literature but are of minimum risk and may be able to help. Tobacco and alcohol use can also be triggers.  \par \par Given her family history of breast she does meet NCCN criteria for genetic testing. We discussed the clinical implications of a positive result on future screening for her and her family members. She was given a referral to our genetic counselors to discuss in further detail.\par \par Patient verbalized understanding for all treatment plans discussed. All of her questions were answered to the best of my ability. She was encouraged to call the office if any questions or concerns or come in sooner if needed.\par \par \par PLAN\par 1. Bilateral Diagnostic mammogram/US in April 2023\par 2. Followup in April after imaging\par 3. Genetics referra. \par \par \par

## 2022-12-14 ENCOUNTER — OUTPATIENT (OUTPATIENT)
Dept: OUTPATIENT SERVICES | Facility: HOSPITAL | Age: 47
LOS: 1 days | Discharge: HOME | End: 2022-12-14

## 2022-12-14 DIAGNOSIS — Z15.89 GENETIC SUSCEPTIBILITY TO OTHER DISEASE: ICD-10-CM

## 2022-12-15 LAB
A1AT SERPL-MCNC: 128 MG/DL
AFP-TM SERPL-MCNC: 1.9 NG/ML
ALBUMIN SERPL ELPH-MCNC: 4.6 G/DL
ALP BLD-CCNC: 62 U/L
ALT SERPL-CCNC: 48 U/L
ANA SER IF-ACNC: NEGATIVE
ANION GAP SERPL CALC-SCNC: 13 MMOL/L
AST SERPL-CCNC: 25 U/L
BILIRUB DIRECT SERPL-MCNC: 0.1 MG/DL
BILIRUB INDIRECT SERPL-MCNC: 0.2 MG/DL
BILIRUB SERPL-MCNC: 0.2 MG/DL
BUN SERPL-MCNC: 13 MG/DL
CALCIUM SERPL-MCNC: 9.9 MG/DL
CERULOPLASMIN SERPL-MCNC: 22 MG/DL
CHLORIDE SERPL-SCNC: 103 MMOL/L
CO2 SERPL-SCNC: 24 MMOL/L
CREAT SERPL-MCNC: 0.73 MG/DL
EGFR: 103 ML/MIN/1.73M2
FERRITIN SERPL-MCNC: 13 NG/ML
GGT SERPL-CCNC: 43 U/L
GLUCOSE SERPL-MCNC: 90 MG/DL
HBV CORE IGG+IGM SER QL: NONREACTIVE
HBV SURFACE AB SER QL: NONREACTIVE
HBV SURFACE AG SER QL: NONREACTIVE
HCV AB SER QL: NONREACTIVE
HCV RNA SERPL NAA+PROBE-LOG IU: NOT DETECTED LOGIU/ML
HCV S/CO RATIO: 0.11 S/CO
HEPATITIS A IGG ANTIBODY: REACTIVE
HEPC RNA INTERP: NOT DETECTED
IGG SER QL IEP: 1140 MG/DL
IRON SATN MFR SERPL: 5 %
IRON SERPL-MCNC: 21 UG/DL
LKM AB SER QL IF: <20.1 UNITS
MITOCHONDRIA AB SER IF-ACNC: NORMAL
NT-PROBNP SERPL-MCNC: 6 PG/ML
POTASSIUM SERPL-SCNC: 4.2 MMOL/L
PROT SERPL-MCNC: 7 G/DL
SMOOTH MUSCLE AB SER QL IF: NORMAL
SODIUM SERPL-SCNC: 140 MMOL/L
SOLUBLE LIVER IGG SER IA-ACNC: 0.7
TIBC SERPL-MCNC: 383 UG/DL
TSH SERPL-ACNC: 3.29 UIU/ML
UIBC SERPL-MCNC: 363 UG/DL

## 2022-12-15 ASSESSMENT — REFRACTION_AUTOREFRACTION
OD_CYLINDER: SPH
OS_AXIS: 075
OS_CYLINDER: -0.50
OS_SPHERE: +0.75
OD_SPHERE: +0.50

## 2022-12-15 ASSESSMENT — KERATOMETRY
OS_AXISANGLE_DEGREES: 135
OD_K2POWER_DIOPTERS: 42.25
OD_AXISANGLE_DEGREES: 080
OD_K1POWER_DIOPTERS: 41.74
OS_K2POWER_DIOPTERS: 42.50
OS_K1POWER_DIOPTERS: 41.75

## 2022-12-15 ASSESSMENT — VISUAL ACUITY
OD_BCVA: 20/25+1
OS_BCVA: 20/20

## 2022-12-15 ASSESSMENT — SPHEQUIV_DERIVED: OS_SPHEQUIV: 0.5

## 2022-12-15 ASSESSMENT — AXIALLENGTH_DERIVED: OS_AL: 23.9063

## 2022-12-17 ENCOUNTER — OUTPATIENT (OUTPATIENT)
Dept: OUTPATIENT SERVICES | Facility: HOSPITAL | Age: 47
LOS: 1 days | End: 2022-12-17
Payer: COMMERCIAL

## 2022-12-17 ENCOUNTER — APPOINTMENT (OUTPATIENT)
Dept: ULTRASOUND IMAGING | Facility: CLINIC | Age: 47
End: 2022-12-17

## 2022-12-17 DIAGNOSIS — Z00.8 ENCOUNTER FOR OTHER GENERAL EXAMINATION: ICD-10-CM

## 2022-12-17 DIAGNOSIS — K76.0 FATTY (CHANGE OF) LIVER, NOT ELSEWHERE CLASSIFIED: ICD-10-CM

## 2022-12-17 PROCEDURE — 76705 ECHO EXAM OF ABDOMEN: CPT

## 2022-12-17 PROCEDURE — 76705 ECHO EXAM OF ABDOMEN: CPT | Mod: 26

## 2022-12-18 ENCOUNTER — RX RENEWAL (OUTPATIENT)
Age: 47
End: 2022-12-18

## 2022-12-21 ENCOUNTER — APPOINTMENT (OUTPATIENT)
Dept: HEMATOLOGY ONCOLOGY | Facility: CLINIC | Age: 47
End: 2022-12-21

## 2022-12-24 NOTE — DISCUSSION/SUMMARY
[FreeTextEntry1] : REASON FOR CONSULT\par Aurelia Guajardo is a 47-year-old female who was referred by Dr. Josette Hall for cancer genetic counseling and risk assessment due to her family history of cancer. She was accompanied by her friend, Nicki. This consultation was carried out with the aid of a  #[id number]. \par \par RELEVANT MEDICAL HISTORY\par Ms. Guajardo is a healthy individual who has never had cancer. She has a family history of cancer, see below.\par \par OTHER MEDICAL AND SURGICAL HISTORY:\par Medical: hypothyroidism, GERD\par Surgical: Cyst removed from breast\par \par PAST OB/GYN HISTORY:\par Obstetrical History: \par Age at Menarche: 15\par Premenopausal \par Age at First Live Birth: 21\par Oral Contraceptive Use: No\par Hormone Replacement Therapy: No\par \par CANCER SCREENING HISTORY:  \par Breast: \par •	Most recent mammo: 2022; Results: BIRADS3 (probably benign findings); Frequency: yearly\par •	Most recent sono: 10/6/2022; Results: BIRADS3 (probably benign findings); Frequency: repeat in 6 months with mammo \par •	No prior breast MRI\par •	Breast Biopsies: 2 total\par •	L-breast biopsy 2017, Results: fibroadenoma\par •	R-breast biopsy 2021, Results: fibroadenoma with usual duct hyperplasia, dense stromal fibrosis with apocrine metaplasia\par GYN:\par •	Most recent GYN visit: 2022; Frequency: yearly\par •	Most recent pap smear: 2022; Results: reported normal\par •	No prior transvaginal ultrasound\par Colon:\par •	No prior colonoscopy\par Skin:  \par •	No prior skin exam, sees dermatologist for cosmetic concerns\par \par SOCIAL HISTORY:\par •	Tobacco-product use: Never smoker\par •	Environmental exposure: Denied\par \par FAMILY HISTORY:\par Maternal and paternal ancestry was reported as [place]. No Ashkenazi Taoism heritage reported. Consanguinity was denied. A detailed family history of cancer was ascertained, see below and scanned chart for pedigree. \par \par To Ms. Guajardo’s knowledge, no one in the family has had germline testing for cancer susceptibility.  \par 	\par 	RISK ASSESSMENT:\par Ms. Guajardo’s family history of breast cancer in her niece (dx 22), her two maternal aunts (dx 42 and 72), and her two maternal first cousins (dx 34 and 34) is suggestive of a hereditary cancer predisposition syndrome. The patient meets National Comprehensive Cancer Network (NCCN) criteria for testing. We recommended genetic testing for Breast Cancer Guidelines-Based Panel. Ms. Guajardo elected to expand her testing to include genes related to gynecologic cancer as well. This test analyzes 19 genes: TANMAY, BARD1, BRCA1, BRCA2, BRIP1, CDH1, CHEK2, EPCAM, MLH1, MSH2, MSH6, NF1, PALB2, PMS2, PTEN, RAD51C, RAD51D, STK11, TP53.\par \par The risks, benefits and limitations of genetic testing were discussed with Ms. Guajardo. In addition, we discussed the purpose of genetic testing and possible test results (positive, negative, inconclusive) along with associated medical management options and psychosocial implications. Insurance coverage and potential out of pocket costs were also discussed. The Genetic Information Non-discrimination Act (SIMRAN) was also reviewed, and Ms. Guajardo had no concerns at this time.\par \par Ms. Guajardo consented to the above-mentioned genetic testing panel. Blood was drawn in our laboratory and sent to [facility] today.\par \par PLAN:\par \par 1.	Blood drawn today will be sent to [facility] for analysis. \par 2.	We will contact Ms. Guajardo to schedule a follow-up appointment once the results are available. Results generally return in 2-3 weeks. \par \par \par For any additional questions please call Cancer Genetics at [phone]. \par \par \par Grisel Begum, MS\par Genetic Counselor, Cancer Genetics\par \par \par CC: Patient\par Dr. Josette Hall

## 2022-12-28 ENCOUNTER — FORM ENCOUNTER (OUTPATIENT)
Age: 47
End: 2022-12-28

## 2023-01-11 ENCOUNTER — NON-APPOINTMENT (OUTPATIENT)
Age: 48
End: 2023-01-11

## 2023-01-12 ENCOUNTER — NON-APPOINTMENT (OUTPATIENT)
Age: 48
End: 2023-01-12

## 2023-01-12 NOTE — DISCUSSION/SUMMARY
[FreeTextEntry1] : Ms. Guajardo was called on 1/11/2023 with the assistance of  ID#[id number] and informed of her NEGATIVE genetic testing results. She provided additional family history information (updated pedigree to be scanned) and was informed that we would discuss at our departmental case conference whether we would like to see her for a collaborative follow-up appointment given her family history of cancer. She was informed that I would reach out after case conference. A copy of the report will be scanned into the patient’s chart.\par \par Grisel Begum, MS\tegan Genetic Counselor

## 2023-01-12 NOTE — DISCUSSION/SUMMARY
[FreeTextEntry1] : Ms. Guajardo was called on 1/12/2023 with the assistance of  ID#[id number] and informed that we would like to set up a collaborative appointment with Dr. Jose Carrasco and myself to discuss family history-based screening recommendations. The patient is aware that our scheduling team will reach out to schedule this appointment.\par \par Grisel Begum MS\par Genetic Counselor\par

## 2023-01-20 ENCOUNTER — APPOINTMENT (OUTPATIENT)
Dept: FAMILY MEDICINE | Facility: CLINIC | Age: 48
End: 2023-01-20
Payer: COMMERCIAL

## 2023-01-20 ENCOUNTER — RESULT CHARGE (OUTPATIENT)
Age: 48
End: 2023-01-20

## 2023-01-20 VITALS
HEART RATE: 85 BPM | HEIGHT: 60 IN | WEIGHT: 174 LBS | RESPIRATION RATE: 12 BRPM | SYSTOLIC BLOOD PRESSURE: 110 MMHG | TEMPERATURE: 97.9 F | BODY MASS INDEX: 34.16 KG/M2 | DIASTOLIC BLOOD PRESSURE: 70 MMHG | OXYGEN SATURATION: 98 %

## 2023-01-20 DIAGNOSIS — D50.8 OTHER IRON DEFICIENCY ANEMIAS: ICD-10-CM

## 2023-01-20 PROCEDURE — 83036 HEMOGLOBIN GLYCOSYLATED A1C: CPT | Mod: QW

## 2023-01-20 PROCEDURE — 99214 OFFICE O/P EST MOD 30 MIN: CPT | Mod: 25

## 2023-01-20 RX ORDER — IRON PS COMPLEX/B12/FOLIC ACID 150-25-1
150-25-1 CAPSULE ORAL
Qty: 60 | Refills: 3 | Status: ACTIVE | COMMUNITY
Start: 2023-01-20 | End: 1900-01-01

## 2023-01-20 NOTE — HEALTH RISK ASSESSMENT
[Never] : Never [No] : In the past 12 months have you used drugs other than those required for medical reasons? No [0] : 2) Feeling down, depressed, or hopeless: Not at all (0) [PHQ-2 Negative - No further assessment needed] : PHQ-2 Negative - No further assessment needed [de-identified] : GI, Breast Sx, Hemeonc. [[id number]] : 0

## 2023-01-20 NOTE — HISTORY OF PRESENT ILLNESS
[FreeTextEntry1] : DM f/up [de-identified] : 46 y/o HF, w/ hx Breast Fibroadenoma, GERD on Famotidine, better controlled DM on metformin, Amaryl and Ozempic ; Hypothyroidism on levotyroxine, HTN on lisinopril.\par last HbA1c:6.8\par  Seen by GI.GERd symptoms control with omeprazole and Famotidine.\par hepatomegaly, fatty liver.\par She is seen by Breast surgeon, Dr barnes. She had a recent mammo and breast Bxp in 02/2021 with clip placement. She was recently seen by Breast surgery for Mastodynia and Breast Fibroadenoma. She is now on Primrose oil and states is doing better.\par Gyn exam with Dr Emelina Carney in [place].\par Last eye exam 03/2021\par Had 2 doses Covid vaccine\par Concern about recent Positive genetic tests on daughter. She had Genetic evaluation.

## 2023-01-20 NOTE — ASSESSMENT
[FreeTextEntry1] : CPE: 02/2022\par HIV: screening: negative 2018\par \par Mammo: 02/2021.S/P Breast bxp.Fibroadenoma-Mastodynia\par -Doing well on Primrose oil.\par F/up w/ Breast sx.\par Gyn: with Emelina Carney in [place].\par \par \par Hypothyroidism: On levothyroxine.\par \par # Anemia:\par -Start iron.\par -Schedule for EGD/ Colonoscopy\par \par GERD: doing better. \par On omeprazole.\par Schedule for EGD/ Colonoscopy with Dr Shah.\par \par  DM:Dx 7/2018: Education and counseling was given to pt with diet guidance. ,\par  HbA1c:8.0\par -Continue metformin 1000mg BID.\par -Continue glimepiride 4mg daily.\par -On Ozempic 0.25mg weekly,\par -D/C Pioglitazone\par -Risks and benefits d/w pt.\par -F/up in 2 months.\par -Refused Dietitian and endocrinology referral. Never done\par -Ophthalmology eval done as per pt.\par -Microalbumin urine order\par \par HTN:\par on Lisinopril 5 mg\par \par # Colon cancer screening:\par -GI referral for colonoscopy\par -Genetic specialist eval..> negative Genetic testing results.\par \par # Immunizations:\par -Flu shot UTD

## 2023-01-31 LAB — SARS-COV-2 N GENE NPH QL NAA+PROBE: NOT DETECTED

## 2023-02-01 ENCOUNTER — TRANSCRIPTION ENCOUNTER (OUTPATIENT)
Age: 48
End: 2023-02-01

## 2023-02-02 ENCOUNTER — OUTPATIENT (OUTPATIENT)
Dept: OUTPATIENT SERVICES | Facility: HOSPITAL | Age: 48
LOS: 1 days | End: 2023-02-02
Payer: COMMERCIAL

## 2023-02-02 ENCOUNTER — RESULT REVIEW (OUTPATIENT)
Age: 48
End: 2023-02-02

## 2023-02-02 ENCOUNTER — APPOINTMENT (OUTPATIENT)
Dept: GASTROENTEROLOGY | Facility: GI CENTER | Age: 48
End: 2023-02-02
Payer: COMMERCIAL

## 2023-02-02 DIAGNOSIS — Z12.11 ENCOUNTER FOR SCREENING FOR MALIGNANT NEOPLASM OF COLON: ICD-10-CM

## 2023-02-02 DIAGNOSIS — K76.0 FATTY (CHANGE OF) LIVER, NOT ELSEWHERE CLASSIFIED: ICD-10-CM

## 2023-02-02 LAB — GLUCOSE BLDC GLUCOMTR-MCNC: 155 MG/DL — HIGH (ref 70–99)

## 2023-02-02 PROCEDURE — 88305 TISSUE EXAM BY PATHOLOGIST: CPT

## 2023-02-02 PROCEDURE — 82962 GLUCOSE BLOOD TEST: CPT

## 2023-02-02 PROCEDURE — 45385 COLONOSCOPY W/LESION REMOVAL: CPT | Mod: 33

## 2023-02-02 PROCEDURE — 88305 TISSUE EXAM BY PATHOLOGIST: CPT | Mod: 26

## 2023-02-02 PROCEDURE — 45385 COLONOSCOPY W/LESION REMOVAL: CPT | Mod: PT

## 2023-02-02 PROCEDURE — 45380 COLONOSCOPY AND BIOPSY: CPT | Mod: PT,XS

## 2023-02-02 NOTE — PHYSICAL EXAM
[Alert] : alert [Normal Voice/Communication] : normal voice/communication [Healthy Appearing] : healthy appearing [No Acute Distress] : no acute distress [Sclera] : the sclera and conjunctiva were normal [Hearing Threshold Finger Rub Not [place]] : hearing was normal [Normal Lips/Gums] : the lips and gums were normal [Normal Appearance] : the appearance of the neck was normal [No Respiratory Distress] : no respiratory distress [No Acc Muscle Use] : no accessory muscle use [Respiration, Rhythm And Depth] : normal respiratory rhythm and effort [Auscultation Breath Sounds / Voice Sounds] : lungs were clear to auscultation bilaterally [Heart Rate And Rhythm] : heart rate was normal and rhythm regular [Normal S1, S2] : normal S1 and S2 [Murmurs] : no murmurs [Bowel Sounds] : normal bowel sounds [Abdomen Tenderness] : non-tender [No Masses] : no abdominal mass palpated [Abdomen Soft] : soft [] : no hepatosplenomegaly [Oriented To Time, Place, And Person] : oriented to person, place, and time

## 2023-02-02 NOTE — ASSESSMENT
[FreeTextEntry1] : The previous encounters have been reviewed and there are no significant changes other those those items which were stated above. The patient's current medical status is stable for the procedure(s) to be performed today.\par 
No

## 2023-02-06 LAB — SURGICAL PATHOLOGY STUDY: SIGNIFICANT CHANGE UP

## 2023-02-09 ENCOUNTER — OUTPATIENT (OUTPATIENT)
Dept: OUTPATIENT SERVICES | Facility: HOSPITAL | Age: 48
LOS: 1 days | Discharge: HOME | End: 2023-02-09

## 2023-02-09 DIAGNOSIS — Z15.89 GENETIC SUSCEPTIBILITY TO OTHER DISEASE: ICD-10-CM

## 2023-02-16 ENCOUNTER — APPOINTMENT (OUTPATIENT)
Dept: HEMATOLOGY ONCOLOGY | Facility: CLINIC | Age: 48
End: 2023-02-16
Payer: COMMERCIAL

## 2023-02-16 PROCEDURE — 99204 OFFICE O/P NEW MOD 45 MIN: CPT | Mod: 95

## 2023-03-08 NOTE — ASSESSMENT
[FreeTextEntry1] : The visit was provided via telehealth using real-time 2-way audio visual technology. The patient, Aurelia Guajardo, [place] [address] at the time of the visit. The Genetic Counselor, Grisel Begum, was located at the medical office located in [place], [place] at the time of the visit. The Physician, Dr. Jose Carrasco, was located in [place], [place] at the time of the visit. The patient, Aurelia Guajardo, and Providers, Grisel Begum and Dr. Jose Carrasco, participated in the telehealth encounter. The patient’s daughter and Malagasy interpreters also participated. Consent for telehealth services was given on 2/16/2023 by the patient, Aurelia Guajardo.\par \par REASON FOR CONSULT\par Aurelia Guajardo is a 47-year-old female who was seen on 2/16/2023 for a discussion regarding her negative genetic testing results related to hereditary cancer predisposition as well as for a discussion regarding high-risk breast cancer surveillance options. Her daughter also participated. This consultation was carried out with the aid of a Malagasy interpreters #[id number] and #[id number].\par \par Ms. Guajardo was originally seen at [facility] on 12/21/2022 for hereditary cancer predisposition risk assessment. Ms. Guajardo is a healthy individual who has never had cancer. She has a family history of breast cancer in her niece (dx 22), her two maternal aunts (dx 42 and 72), and her two maternal first cousins (dx 34 and 34). Ms. Guajardo decided to pursue genetic testing using the Breast and Gyn Cancers Guidelines-Based Panel offered by [facility].\par \par TEST RESULTS: NEGATIVE\par \par NO pathogenic (disease-causing) variants or VUSs were detected in the following 19genes:  TANMAY, BARD1, BRCA1, BRCA2, BRIP1, CDH1, CHEK2, EPCAM, MLH1, MSH2, MSH6, NF1, PALB2, PMS2, PTEN, RAD51C, RAD51D, STK11, TP53.\par \par RESULTS INTERPRETATION AND ASSESSMENT:\par Given Ms. Guajardo’s current reported family history of cancer, and her negative genetic test results, the following screening guidelines and risk-reducing recommendations were discussed:\par \par BREAST: \par - We discussed that her estimated 10-year risk for developing breast cancer, based on the Tyrer-Cuzick (v8.0) breast cancer risk calculation, is 4.3% (compared to population rate of 1.6%), and her estimated lifetime risk for developing breast cancer to age 85 is 26.7% (compared to population rate of 10.8%). Per NCCN Guidelines, for women with a lifetime risk greater than 20%-25%, high-risk breast screening with the addition of yearly breast MRIs may be considered. Given Ms. Guajardo’s family history of breast cancer and her Tyrer-Cuzick score, we discussed that it is reasonable to consider incremental screening with annual breast MRIs.\par - Of note, Ms. Guajardo currently receives annual mammograms. She had both a mammogram and sonogram in 2022. \par -Furthermore, we discussed that at this level of risk, chemoprevention options, such as Tamoxifen, and risk-reducing mastectomy is not generally recommended. \par \par OTHER: In the absence of other indications, Ms. Guajardo should practice age-appropriate cancer screening of other organ systems as recommended for the general population.\par \par We also discussed the limitations of negative results:\par 1.	The cause of Ms. Guajardo’s family history of cancer remains unknown. The cancer(s) may have developed randomly, or due to environmental factors.  \par 2.	This negative result does not completely rule out a hereditary basis for the reported family history due to limitations in technology or a variant being present in an unidentified gene. \par 3.	Variants in other genes would not be identified by this analysis, so this negative result does not rule out the low likelihood of having a mutation in a different hereditary cancer gene or the possibility of ever developing cancer.\par 4.	It is possible there is a hereditary cancer predisposition gene mutation in the family, but the patient did not inherit it. \par \par We informed Ms. Guajardo that our knowledge of genetics and inherited cancer conditions is changing rapidly. Therefore, we recommended that Ms. Guajardo contact our office, every 2 to 3 years, to discuss relevant advances in cancer genetics.  We emphasized the importance of re-contacting us with updates regarding her personal and family history of cancer as well as any updates regarding additional cancer genetic test results performed for the patient and/or family members.  Such updates could possibly change our risk assessment and recommendations. \par \par IMPLICATIONS FOR FAMILY MEMBERS\par Ms. Guajardo’s current test results do not exclude the possibility of a germline mutation in her at-risk relatives. Thus, to further clarify the risk for her and her family, Ms. Guajardo’s niece and maternal relatives may consider pursuing cancer risk assessment genetic counseling with the option of genetic testing. Of note, Ms. Guajardo reported that her relatives do not reside in the [place].\par \par We discussed that her daughter may undergo a genetics risk evaluation as she approaches age 30.\par \par PLAN:\par 1.	These genetic results do not change Ms. Guajardo’s medical management. However, she may consider increased screening via annual breast MRI based on the family history of breast cancer and her estimated lifetime risk for developing breast cancer as provided by the Tyrer-Cuzick model (see discussion above) at the discretion of her care team.\par 2.	Patient informed consult note(s) will be available through their [facility] patient portal and genetic test results will be released via [facility]’s laboratory portal. \par 3.	Ms. Guajardo was encouraged to contact us every 2-3 years to discuss relevant advances in cancer genetics, or sooner if there are any changes in her personal or family history of cancer.\par \par \par Grisel Begum, MS\par Genetic Counselor\par \par \par Attending Attestation:\par \par I have reviewed and edited the genetic counselor's note and I agree with the assessment and plan as documented. I spent approximately 45 minutes in total time of which approximately 20-25 minutes was face-to-face (via 2-way audiovisual telemedicine connection) with Ms. Guajardo reviewing her relevant personal and family history, the genetic testing results, our risk-assessment, and options for future cancer risk-reduction for the patient and her relevant family members. Over half this time was spent in counseling and coordination of care.\par \par Jose Carrasco MD\par Chief, Cancer Genetics\par [facility]\par \par \par \par CC: Patient\par Dr. Josette Hall

## 2023-03-20 ENCOUNTER — APPOINTMENT (OUTPATIENT)
Dept: GASTROENTEROLOGY | Facility: CLINIC | Age: 48
End: 2023-03-20
Payer: COMMERCIAL

## 2023-03-20 VITALS
WEIGHT: 174 LBS | HEIGHT: 60 IN | BODY MASS INDEX: 34.16 KG/M2 | SYSTOLIC BLOOD PRESSURE: 110 MMHG | OXYGEN SATURATION: 98 % | RESPIRATION RATE: 16 BRPM | DIASTOLIC BLOOD PRESSURE: 70 MMHG | HEART RATE: 80 BPM

## 2023-03-20 PROCEDURE — 99204 OFFICE O/P NEW MOD 45 MIN: CPT

## 2023-04-03 ENCOUNTER — APPOINTMENT (OUTPATIENT)
Dept: FAMILY MEDICINE | Facility: CLINIC | Age: 48
End: 2023-04-03
Payer: COMMERCIAL

## 2023-04-03 VITALS
HEART RATE: 90 BPM | SYSTOLIC BLOOD PRESSURE: 120 MMHG | HEIGHT: 60 IN | BODY MASS INDEX: 34.16 KG/M2 | WEIGHT: 174 LBS | RESPIRATION RATE: 12 BRPM | OXYGEN SATURATION: 98 % | DIASTOLIC BLOOD PRESSURE: 80 MMHG

## 2023-04-03 DIAGNOSIS — K21.00 GASTRO-ESOPHAGEAL REFLUX DISEASE WITH ESOPHAGITIS, WITHOUT BLEEDING: ICD-10-CM

## 2023-04-03 DIAGNOSIS — M25.551 PAIN IN RIGHT HIP: ICD-10-CM

## 2023-04-03 PROCEDURE — 83036 HEMOGLOBIN GLYCOSYLATED A1C: CPT | Mod: QW

## 2023-04-03 PROCEDURE — 99214 OFFICE O/P EST MOD 30 MIN: CPT | Mod: 25

## 2023-04-03 NOTE — HEALTH RISK ASSESSMENT
[No] : In the past 12 months have you used drugs other than those required for medical reasons? No [0] : 2) Feeling down, depressed, or hopeless: Not at all (0) [PHQ-2 Negative - No further assessment needed] : PHQ-2 Negative - No further assessment needed [de-identified] : GI, Breast Sx, Susan. hepatology [[id number]] : 0

## 2023-04-03 NOTE — HISTORY OF PRESENT ILLNESS
[FreeTextEntry1] : DM f/up [de-identified] : 47 y/o HF, w/ hx Breast Fibroadenoma, GERD on Famotidine, better controlled DM on metformin, Amaryl and Ozempic ; Hypothyroidism on levotyroxine, HTN on lisinopril.\par last HbA1c:8\par  Seen by GI.GERd symptoms control with omeprazole and Famotidine. She reports persistent bloating. Hx H.Pylori treated in the past.\par hepatomegaly, fatty liver. Seen by Hepatology\par She is seen by Breast surgeon, Dr barnes. She had a recent mammo and breast Bxp in 02/2021 with clip placement. She was recently seen by Breast surgery for Mastodynia and Breast Fibroadenoma. She is now on Primrose oil and states is doing better.\par Gyn exam with Dr Emelina Carney in [place].\par Last eye exam 03/2021\par Had 2 doses Covid vaccine\par Concern about recent Positive genetic tests on daughter. She had Genetic evaluation.\par reports pain in RT hip with radiation down the leg to knee for aprox 1 month. Denies trauma, injury or fall.

## 2023-04-03 NOTE — ASSESSMENT
[FreeTextEntry1] : CPE: 02/2022\par HIV: screening: negative 2018\par \par Mammo: 02/2021.S/P Breast bxp.Fibroadenoma-Mastodynia\par -Doing well on Primrose oil.\par F/up w/ Breast sx.\par Gyn: with Emelina Carney in [place].\par \par \par Hypothyroidism: On levothyroxine.\par \par # Anemia:\par -On iron.\par -Colonoscopy 02/2023> Polypectomy> Repeat Colonoscopy in 3 years.\par \par GERD: Hx H.P {pylori in the past\par On omeprazole.\par H.Pylori test\par \par  DM:Dx 7/2018: Education and counseling was given to pt with diet guidance. ,\par  HbA1c:7.2\par -Continue metformin 1000mg BID.\par -Continue glimepiride 4mg daily.\par -On Ozempic 0.25mg weekly,\par -D/C Pioglitazone\par -Risks and benefits d/w pt.\par -F/up in 3 months.\par -Refused Dietitian and endocrinology referral. Never done\par -Ophthalmology eval done as per pt.\par \par HTN:\par on Lisinopril 5 mg\par \par # Colon cancer screening:\par - colonoscopy 02/02/23 with Dr Shah\par -Genetic specialist eval..> negative Genetic testing results.\par \par # RT hip pain:> Isquio bad syndrome?\par -Orthopedic referral.\par \par # Immunizations:\par -Flu shot UTD

## 2023-04-03 NOTE — COUNSELING
[None] : None [Good understanding] : Patient has a good understanding of lifestyle changes and steps needed to achieve self management goal [Fall prevention counseling provided] : Fall prevention counseling provided [Adequate lighting] : Adequate lighting [No throw rugs] : No throw rugs [Use proper foot wear] : Use proper foot wear [Behavioral health counseling provided] : Behavioral health counseling provided

## 2023-04-07 NOTE — HISTORY OF PRESENT ILLNESS
[FreeTextEntry1] : TANNER ORTIZ is a 47 year year old female with a PMH significant for DM, HTN, HLD, and Hypothyroidism.\par \par Presents for evaluation of elevated LFT's.\par \par  Denies tattoos. Denies OTC or herbal supplements. Denies fatigue, malaise, arthralgias, myalgias, pruritus, recent infection, abdominal pain or distension, jaundice, hematemesis, hematochezia, dark urine, confusion, unintentional weight loss or gain. Denies family history of sudden death or late trimester miscarriages.\par \par Alcohol consumption Denies\par Family history/ Liver disease:  No pertinent family history of liver disease in first degree relatives\par h/o Breast Cancer in her family.\par \par Physical exam:\par Gen: A&Ox3, NAD\par HEENT: normal outer ears & nose, PERRL, mucus membranes moist, anicteric, no lymphadenopathy\par CVS: regular rate and rhythm, no murmur\par Pulm: vesicular breath sounds\par Abdomen: normal bowel sounds, soft, nontender, no hepatosplenomegaly\par Legs: no edema, no clubbing\par Musculoskeletal: normal gait\par Skin: no rash\par Neuro: no asterixis, EOMI\par Psych: normal affect\par \par \par

## 2023-04-07 NOTE — ASSESSMENT
[FreeTextEntry1] : A very pleasant 48 year female patient with metabolic syndrome morbid obesity, hypertension, high waist circumference comes in for evaluation and management of hepatic steatosis.\par Labs ordered to rule out competing etiologies of liver disease.\par Most likely nonalcoholic fatty liver disease.  This was discussed with the patient in detail.\par Etiology and natural history of fatty liver was explained to her in detail.  Complications were discussed in detail.\par There is no pill for fatty liver.  The recommendations are lifestyle modifications in the form of diet and exercise.\par  I gave the patient a copy of the Mediterranean diet.  Weight loss of upto 10% of the current body weight recommended.\par \par Follow-up in 3 weeks.\par Consider FibroScan  at that visit.\par

## 2023-04-18 ENCOUNTER — APPOINTMENT (OUTPATIENT)
Dept: SURGERY | Facility: CLINIC | Age: 48
End: 2023-04-18

## 2023-04-19 ENCOUNTER — APPOINTMENT (OUTPATIENT)
Dept: ULTRASOUND IMAGING | Facility: CLINIC | Age: 48
End: 2023-04-19
Payer: COMMERCIAL

## 2023-04-19 ENCOUNTER — RESULT REVIEW (OUTPATIENT)
Age: 48
End: 2023-04-19

## 2023-04-19 ENCOUNTER — OUTPATIENT (OUTPATIENT)
Dept: OUTPATIENT SERVICES | Facility: HOSPITAL | Age: 48
LOS: 1 days | End: 2023-04-19
Payer: COMMERCIAL

## 2023-04-19 ENCOUNTER — APPOINTMENT (OUTPATIENT)
Dept: MAMMOGRAPHY | Facility: CLINIC | Age: 48
End: 2023-04-19
Payer: COMMERCIAL

## 2023-04-19 DIAGNOSIS — D24.1 BENIGN NEOPLASM OF RIGHT BREAST: ICD-10-CM

## 2023-04-19 PROCEDURE — 76641 ULTRASOUND BREAST COMPLETE: CPT | Mod: 26,50

## 2023-04-19 PROCEDURE — 76641 ULTRASOUND BREAST COMPLETE: CPT

## 2023-04-19 PROCEDURE — 77063 BREAST TOMOSYNTHESIS BI: CPT | Mod: 26

## 2023-04-19 PROCEDURE — 77067 SCR MAMMO BI INCL CAD: CPT | Mod: 26

## 2023-04-19 PROCEDURE — 77067 SCR MAMMO BI INCL CAD: CPT

## 2023-04-19 PROCEDURE — 77063 BREAST TOMOSYNTHESIS BI: CPT

## 2023-04-20 ENCOUNTER — APPOINTMENT (OUTPATIENT)
Dept: ORTHOPEDIC SURGERY | Facility: CLINIC | Age: 48
End: 2023-04-20
Payer: COMMERCIAL

## 2023-04-20 VITALS
DIASTOLIC BLOOD PRESSURE: 74 MMHG | SYSTOLIC BLOOD PRESSURE: 107 MMHG | HEIGHT: 60 IN | WEIGHT: 174 LBS | HEART RATE: 84 BPM | BODY MASS INDEX: 34.16 KG/M2

## 2023-04-20 PROCEDURE — 73502 X-RAY EXAM HIP UNI 2-3 VIEWS: CPT

## 2023-04-20 PROCEDURE — 99203 OFFICE O/P NEW LOW 30 MIN: CPT

## 2023-04-20 NOTE — REASON FOR VISIT
[Initial Visit] : an initial visit for [[employer] ] : provided by [employer]   [Time Spent: ____ minutes] : Total time spent using  services: [unfilled] minutes. The patient's primary language is not English thus required  services. [FreeTextEntry2] : right hip pain  [Interpreters_IDNumber] : [id number] [Interpreters_FullName] : Tejal [TWNoteComboBox1] : Tongan

## 2023-04-20 NOTE — PHYSICAL EXAM
[de-identified] : General:\par Awake, alert, no acute distress, Patient was cooperative and appropriate during the examination.\par \par The patient's weight is overweight for height and age.\par \par Walks with without an antalgic gait.\par \par Full, painless range of motion of the neck and back.\par \par Exam of the bilateral lower extremities is intact and symmetric with regards to dermatologic, vascular, and neurologic exam. Bilateral lower extremity sensation is grossly intact to light touch in the DP/SP/T/S/S nerve distributions. Intact DF/PF/EHL. BIlateral lower extremity warm and well-perfused with brisk capillary refill.\par \par Pulmonary:\par Regular, nonlabored breathing\par \par Abdomen:\par Soft, nontender, nondistended.\par \par Lymphatic:\par No evidence of inguinal lymphadenopathy\par \par \par \par Right hip Examination:\par Physical examination of the hip demonstrates normal skin without signs of skin changes or abnormalities. No erythema, warmth, or joint effusion is appreciated.\par \par Sensation is intact to light touch L2-S1\par Palpable DP/PT pulse\par EHL/FHL/TA/GSC motor function intact\par \par Range of Motion\par 110 degrees of flexion to full extension\par 45 degrees of internal rotation\par 60 degrees of external rotation\par 45 degrees of hip abduction\par 20 degrees of hip adduction\par \par Strength Testing\par Hip Flexors/Hip Extensors 5/5\par Hip Abductors/Hip Adductors 5/5\par Quadriceps/Hamstring 5/5\par Patient is able to perform a straight leg raise without difficulty.\par \par Palpation\par Exquisitely tender to palpation about the greater trochanter\par Not tender to palpation about the hip joint\par Not tender to palpation about the lumbar spinous processes\par Not tender to palpation about the iliac crests or sacrum\par \par Special Tests\par JULIAN test negative\par FADIR test positive for pain at the greater trochanter\par Rosaura test negative\par Straight leg raise test negative\par \par  [de-identified] : X-rays including an AP pelvis and 2 views of the right hip were obtained in the office today on 4/20/2023 and reviewed the patient.  There is no acute fracture or dislocation.  Patient has mild degenerative changes of the hip with evidence of HO and pincer impingement.  She also has a small cam deformity.  There is calcific tendinopathy at the hip abductor insertion on the greater trochanter.

## 2023-04-20 NOTE — HISTORY OF PRESENT ILLNESS
[de-identified] : 4/20/2023: Aurelia is a pleasant 48-year-old Montserratian-speaking female who presents to the office today complaining of right sided hip pain.  This visit was performed with the use of a .  The patient states her pain began several months ago but she denies any history of trauma.  The pain is activity related and alleviated by rest.  Hurts to walk long distances and it hurts when she lays on her right side at night.  She has never had pain like this before.  She has not had any treatment for.  The patient denies any fevers, chills, sweats, recent illnesses, numbness, tingling, weakness, or pain elsewhere at this time.

## 2023-04-20 NOTE — DISCUSSION/SUMMARY
[de-identified] : Assessment: 48-year-old female with right hip pain secondary to calcific trochanteric bursitis\par \par Plan: I had a long discussion with the patient today regarding the nature of their diagnosis and treatment plan. We discussed the risks and benefits of no treatment as well as nonoperative and operative treatments.  I reviewed the patient's x-rays today with her in the office which demonstrate mild degenerative changes as well as calcific tendinopathy of both hips.  On examination the patient has isolated pain about the greater trochanter on the right side.  At this time I recommend conservative treatment of the patient's condition with modalities including rest, ice, heat, anti-inflammatory medications, activity modifications, and home stretching and strengthening exercises daily.  A prescription for meloxicam was sent to the patient's pharmacy today.  I discussed with the patient the risks and benefits associated with NSAID use.  A referral for physical therapy was provided to begin working exercises for her hip to help improve her pain and function.  Recommend follow-up in 8 weeks for repeat assessment.  If the patient continues to have pain at that time we will consider a cortisone injection versus an MRI.\par \par The patient verbalizes their understanding and agrees with the plan.  All questions were answered to their satisfaction.

## 2023-04-24 ENCOUNTER — NON-APPOINTMENT (OUTPATIENT)
Age: 48
End: 2023-04-24

## 2023-04-24 LAB — H PYLORI AG STL QL: NEGATIVE

## 2023-04-25 ENCOUNTER — NON-APPOINTMENT (OUTPATIENT)
Age: 48
End: 2023-04-25

## 2023-04-25 ENCOUNTER — RESULT REVIEW (OUTPATIENT)
Age: 48
End: 2023-04-25

## 2023-04-26 ENCOUNTER — RESULT REVIEW (OUTPATIENT)
Age: 48
End: 2023-04-26

## 2023-04-26 ENCOUNTER — OUTPATIENT (OUTPATIENT)
Dept: OUTPATIENT SERVICES | Facility: HOSPITAL | Age: 48
LOS: 1 days | End: 2023-04-26
Payer: COMMERCIAL

## 2023-04-26 ENCOUNTER — APPOINTMENT (OUTPATIENT)
Dept: ULTRASOUND IMAGING | Facility: CLINIC | Age: 48
End: 2023-04-26
Payer: COMMERCIAL

## 2023-04-26 DIAGNOSIS — R92.8 OTHER ABNORMAL AND INCONCLUSIVE FINDINGS ON DIAGNOSTIC IMAGING OF BREAST: ICD-10-CM

## 2023-04-26 PROCEDURE — 88305 TISSUE EXAM BY PATHOLOGIST: CPT | Mod: 26

## 2023-04-26 PROCEDURE — 88305 TISSUE EXAM BY PATHOLOGIST: CPT

## 2023-04-26 PROCEDURE — 77065 DX MAMMO INCL CAD UNI: CPT | Mod: 26,RT

## 2023-04-26 PROCEDURE — 19083 BX BREAST 1ST LESION US IMAG: CPT

## 2023-04-26 PROCEDURE — 77065 DX MAMMO INCL CAD UNI: CPT

## 2023-04-26 PROCEDURE — 19083 BX BREAST 1ST LESION US IMAG: CPT | Mod: RT

## 2023-04-26 PROCEDURE — A4648: CPT

## 2023-05-10 ENCOUNTER — APPOINTMENT (OUTPATIENT)
Dept: SURGERY | Facility: CLINIC | Age: 48
End: 2023-05-10
Payer: COMMERCIAL

## 2023-05-10 VITALS
DIASTOLIC BLOOD PRESSURE: 80 MMHG | BODY MASS INDEX: 34.36 KG/M2 | HEIGHT: 60 IN | SYSTOLIC BLOOD PRESSURE: 130 MMHG | WEIGHT: 175 LBS

## 2023-05-10 DIAGNOSIS — Z12.39 ENCOUNTER FOR OTHER SCREENING FOR MALIGNANT NEOPLASM OF BREAST: ICD-10-CM

## 2023-05-10 PROCEDURE — 99213 OFFICE O/P EST LOW 20 MIN: CPT

## 2023-05-10 NOTE — HISTORY OF PRESENT ILLNESS
[FreeTextEntry1] : Problem List:\par 1. Right breast fibroadenomas\par     - Right 12:00 (1.3cm)  2:00 (1.3cm), 3:00 (1cm)\par 2. Multiple bilateral breast nodules; stable \par 3. Family history of breast cancer\par 4. High-risk for breast cancer; TK 26.7%\par \par CARE TEAM\par PCP = Marily\par Plastics = [facility]\par \par \par INTERVAL HISTORY:\par 12/13/22- - Patient presents today for followup and review of imaging. She reports she has persistent bilateral breast pain. It comes and goes but happens most days. She denies any nipple discharge or skin changes. She also expresses some concern due to a strong family history of breast cancer. \par \par 5/10/23 - 48 year old female presents for followup after recent breast biopsy significant for fibroadenoma on pathology. She met with our genetic counselors and testing was negative. High risk screening discussed with patient \par \par \par HPI:\par - Patient initially saw Dr. Coon in January 2017 for right breast pain and lump. She went for diagnostic imaging demonstrating bilateral breast masses that were ultimately biopsied and found to be fibroadenomas. She has since been undergoing surveillance imaging for bilateral breast masses, BIRADS 3. \par \par P niece at 22, M cousin x2 at 34, M aunt at 42, and M aunt at 72. \par \par \par IM[facility]:\[facility] & US Breast Complete Bilateral - MAMMOGRAM: The breasts are heterogeneously dense, which may obscure small masses. No suspicious mass, suspicious microcalcifications, or other sign of malignancy is identified. There are now 2 biopsy clips in the right breast. A biopsy clip in the left breast is unchanged. Bilateral nodularity is stable. ULTRASOUND: Multiple bilateral ultrasound findings. Follow-up ultrasound in 6 month advised. - BIRADS 3: Probably Benign Finding(s).\par \par 10/06/22 - [facility]: US Breast Limited Bilateral - No significant changes. Continued ultrasound follow-up advised in 6 months, with the patient will also be due for annual bilateral mammogram. - BIRADS 3: Probably Benign Finding(s).\par \par 04/19/23 - [facility]: Bilateral Screening Mammogram & Complete Bilateral Breast Ultrasound - IMPRESSION: Recommend ultrasound-guided biopsy of new indeterminate right breast nodule at 2:00, 10 cm from the nipple, and new indeterminate left breast nodule at 8:00, 4 cm from the nipple. - BIRADS 4A: Suspicious Finding(s). Low suspicion for malignancy.\par \par 04/26/23- [facility]: Right Ultrasound Core Needle Biopsy - Q shaped marking clip at biopsy site. Pathology from right breast mass at 2:00 O'clock, 10 cm from nipple = Fibroadenoma with exaggerated intracanalicular pattern, three cores ranging from 1 cm to 1.8 cm, results are benign and concordant  Anesthesia Type: 1% lidocaine with epinephrine Add Associated Diagnoses If Applicable When Selecting Medical Necessity: Yes Anesthesia Volume In Cc: 3 Medical Necessity Information: It is in your best interest to select a reason for this procedure from the list below. All of these items fulfill various CMS LCD requirements except the new and changing color options. Include Z78.9 (Other Specified Conditions Influencing Health Status) As An Associated Diagnosis?: No Consent: Written consent obtained and the risks of skin tag removal was reviewed with the patient including but not limited to bleeding, pigmentary change, infection, pain, and remote possibility of scarring. Medical Necessity Clause: This procedure was medically necessary because the lesions that were treated were: Detail Level: Simple

## 2023-05-10 NOTE — ASSESSMENT
[FreeTextEntry1] : 48 year old premenopausal female with history of bilateral fibroadenomatosis presenting for a six month follow up. pt had screening mammogram and ultrasound preformed, showed new nodule in right breast. pt had biopsy preformed on right breast that showed fibroadenoma at 2:00 o'clock 10 cm from nipple, benign. \par \par We discussed the patient's diagnosis of fibroadenoma and its pathophysiology. We discussed that these are most common in young women, can fluctuate in size, and often times regress after menopause. We discussed that these are benign masses and do not increase the breast cancer risk by much, if at all. They can sometimes be sources of pain/discomfort, but can also by asymptomatic. We discussed the treatment options including continued surveillance vs surgical excision, and reasons for excision including enlarging size and pain. Will observe fibroadenoma \par \par High risk breast screening discussed with patient. Will get MRI in October, repeat ultrasound/mammogram in one year \par We discussed the clinical significance of being high-risk for breast cancer and the implications for additional screening. This would include both biannual clinical breast exams as well as imaging screening with mammogram and MRI (in 6-month intervals). We discussed that MRI screening does not improve overall survival from breast cancer, but has been shown to detect breast cancer earlier, resulting in smaller surgeries, less invasive treatment, and less morbidity. We discussed risks of MRI including contrast administration, possibility of additional imaging/biopsies, as well as the contrast accumulation in the brain (which has not shown any clinical significance to date). Chemoprevention discussed but not favored. \par \par \par Patient verbalized understanding for all treatment plans discussed. All of her questions were answered to the best of my ability. She was encouraged to call the office if any questions or concerns or come in sooner if needed.\par \par \par PLAN\par 1. MRI breast in October 2023\par 2. Follow-up in October after imaging\par 3. Bilateral mammogram/US April 2024\par

## 2023-05-10 NOTE — REVIEW OF SYSTEMS
[Breast Pain] : no breast pain [Breast Lump] : no breast lump [Nipple Discharge] : no nipple discharge [Nipple Inverted] : no inversion of the nipple

## 2023-06-08 ENCOUNTER — APPOINTMENT (OUTPATIENT)
Dept: ORTHOPEDIC SURGERY | Facility: CLINIC | Age: 48
End: 2023-06-08
Payer: COMMERCIAL

## 2023-06-08 DIAGNOSIS — M25.552 PAIN IN LEFT HIP: ICD-10-CM

## 2023-06-08 DIAGNOSIS — M25.551 PAIN IN RIGHT HIP: ICD-10-CM

## 2023-06-08 PROCEDURE — 73522 X-RAY EXAM HIPS BI 3-4 VIEWS: CPT

## 2023-06-08 PROCEDURE — 99213 OFFICE O/P EST LOW 20 MIN: CPT

## 2023-06-08 RX ORDER — MELOXICAM 15 MG/1
15 TABLET ORAL
Qty: 30 | Refills: 2 | Status: ACTIVE | COMMUNITY
Start: 2023-04-20 | End: 1900-01-01

## 2023-06-08 NOTE — REASON FOR VISIT
[[employer] ] : provided by [employer]   [Time Spent: ____ minutes] : Total time spent using  services: [unfilled] minutes. The patient's primary language is not English thus required  services. [Initial Visit] : an initial visit for [FreeTextEntry2] : right hip pain  [Interpreters_IDNumber] : [id number] [Interpreters_FullName] : Tejal [TWNoteComboBox1] : Malian

## 2023-06-08 NOTE — DISCUSSION/SUMMARY
Name: Claudine Rai      : 1938      MRN: [id number]  Encounter Provider: Odalis Cr MD  Encounter Date: 2/10/2023   Encounter department: [facility]    Assessment & Plan     1  Acute blood loss anemia  Assessment & Plan:    2023: WBC 3 43, hemoglobin 7 3, platelet 997, CMP normal except blood sugar 152, creatinine 0 59, calcium 0 calcium 8 2, GFR 85, hemoglobin A1c elevated, cholesterol 98 LDL 29 triglyceride 112 HDL 47 hemoglobin A1c elevated    2023: WBC 4 04, hemoglobin 7 5, platelet 141, total protein 6 5, iron saturation 10%, TIBC 422, iron 44, ferritin 21, cryoglobulin negative, lymphoma leukemia panel negative, serum protein electrophoresis negative, no monoclonal bands noted, Ig Kappa free light chain 37 4, Ig lambda free light chain 22 4, kappa/lambda ratio 1 67 slightly high IgA 157 IgG 773 and IgM 108  23:EGD: 1   Moderate degree of gastritis  2  No sign of upper GI bleeding  3  Slightly flattened small bowel villi, biopsies were done  4  Esophagus  23: Colonoscopy:  1  Suboptimal bowel prep with large amount of liquid stool throughout the colon, visualization was suboptimal  2  Colon polyp removed  3  Extensive diverticulosis throughout the colon  4  Internal hemorrhoid     RECOMMENDATION:    No further screening colonoscopies necessary     Age greater than 72      30 discussion with patient's daughter Rl Dallas as well as other family member at bedside regarding differential diagnosis of the anemia  Multiple reason including being on chronic anticoagulation, mechanical mitral valve, subtle blood loss cannot be ruled out, also history of celiac, probable absorption problem,    Patient is going for repeat upper GI endoscopy next week  Patient is getting iron infusion for 5 weeks  Patient is advised to stop taking oral iron by hematologist   Patient is being followed by them      Advised him to discuss with them about abnormal [de-identified] : Assessment: 48-year-old female with bilateral hip pain secondary to calcific trochanteric bursitis\par \par Plan: I had a long discussion with the patient today regarding the nature of their diagnosis and treatment plan. We discussed the risks and benefits of no treatment as well as nonoperative and operative treatments.  I reviewed the patient's x-rays today with her in the office which demonstrate mild degenerative changes as well as calcific tendinopathy of both hips.  On examination today her left hip is more bothersome than her right and her right is doing generally better and is not giving her issues.  Due to improvement with conservative treatment including therapy and medicine from the last office visit with the right hip I am recommending that we reinitiate physical therapy and medication for the left hip.  She was given a new physical therapy prescription today for pain and inflammation and given a new prescription of meloxicam today.  GI precautions were discussed.  She will continue with ice, heat, rest, avoid leaning on the hips as much as possible to assist with pain and inflammation.  She will follow-up in 2 months as needed for repeat evaluation.  We may consider MRI versus injection if symptoms were to persist or worsen despite conservative treatment.  Patient discussed and reviewed with Dr. Cm today.\par \par The patient verbalizes their understanding and agrees with the plan.  All questions were answered to their satisfaction. kappa/lambda ratio  Will monitor the trend  Advised him to discuss with cardiologist about recurrent anemia anticoagulation mechanical heart valve issue which is very complex  Orders:  -     Ambulatory referral to Neurology; Future    2  Anemia, unspecified type  Assessment & Plan:    2/6/2023: WBC 3 43, hemoglobin 7 3, platelet 247, CMP normal except blood sugar 152, creatinine 0 59, calcium 0 calcium 8 2, GFR 85, hemoglobin A1c elevated, cholesterol 98 LDL 29 triglyceride 112 HDL 47 hemoglobin A1c elevated    2/2/2023: WBC 4 04, hemoglobin 7 5, platelet 265, total protein 6 5, iron saturation 10%, TIBC 422, iron 44, ferritin 21, cryoglobulin negative, lymphoma leukemia panel negative, serum protein electrophoresis negative, no monoclonal bands noted, Ig Kappa free light chain 37 4, Ig lambda free light chain 22 4, kappa/lambda ratio 1 67 slightly high IgA 157 IgG 773 and IgM 108  1-12-23:EGD: 1   Moderate degree of gastritis  2  No sign of upper GI bleeding  3  Slightly flattened small bowel villi, biopsies were done  4  Esophagus  1-12-23: Colonoscopy:  1  Suboptimal bowel prep with large amount of liquid stool throughout the colon, visualization was suboptimal  2  Colon polyp removed  3  Extensive diverticulosis throughout the colon  4  Internal hemorrhoid     RECOMMENDATION:    No further screening colonoscopies necessary     Age greater than 72      30 discussion with patient's daughter Rabago Prime as well as other family member at bedside regarding differential diagnosis of the anemia  Multiple reason including being on chronic anticoagulation, mechanical mitral valve, subtle blood loss cannot be ruled out, also history of celiac, probable absorption problem,    Patient is going for repeat upper GI endoscopy next week  Patient is getting iron infusion for 5 weeks  Patient is advised to stop taking oral iron by hematologist   Patient is being followed by them      Advised him to discuss with them about abnormal kappa/lambda ratio  Will monitor the trend  Advised him to discuss with cardiologist about recurrent anemia anticoagulation mechanical heart valve issue which is very complex  3  H/O mitral valve replacement with mechanical valve    4  Long term (current) use of anticoagulants    5  Presence of prosthetic heart valve    6  Vitamin B12 deficiency  Assessment & Plan:  Recommend over-the-counter vitamin B12 1000 mcg daily  7  History of anemia due to CKD  Assessment & Plan:  Lab Results   Component Value Date    EGFR 85 02/06/2023    EGFR 85 02/02/2023    EGFR 83 01/17/2023    CREATININE 0 59 (L) 02/06/2023    CREATININE 0 58 (L) 02/02/2023    CREATININE 0 63 01/17/2023   Renal functions are stable  8  Celiac disease    9  Type 2 diabetes mellitus without complication, without long-term current use of insulin [facility])  Assessment & Plan:    Lab Results   Component Value Date    HGBA1C 5 5 01/10/2023   Of metformin  Repeat hemoglobin A1c awaited it was done 3 days ago  Advised to call back in 1 week  Orders:  -     atorvastatin (LIPITOR) 40 mg tablet; Take 1 tablet (40 mg total) by mouth daily  -     Comprehensive metabolic panel; Future; Expected date: 05/10/2023  -     Hemoglobin A1C; Future  -     Microalbumin / creatinine urine ratio  -     Lipid panel; Future    10  Other specified hypothyroidism  Assessment & Plan:  Lab Results   Component Value Date    [id number] 1 832 08/12/2022     Normal   No hypothyroidism clinically  And not on any supplement  11  Pulmonary emphysema, unspecified emphysema type ([address] )  Assessment & Plan:  Her COPD flare  Not using any inhaler at this time  12  Essential hypertension  Assessment & Plan:  Hypertension well controlled  Will monitor    Orders:  -     atorvastatin (LIPITOR) 40 mg tablet; Take 1 tablet (40 mg total) by mouth daily  -     Comprehensive metabolic panel;  Future; Expected date: 05/10/2023  -     Hemoglobin A1C; Future  -     Microalbumin / creatinine urine ratio    13  Abdominal aortic aneurysm (AAA) without rupture, unspecified part    14  Other iron deficiency anemia    15  Cerebrovascular accident (CVA), unspecified mechanism ([address] )  Assessment & Plan:  Taking aspirin on her own  Or maybe somebody recommended  It is unclear to me  Recommend to see neurologist this was being used for CVA RCVS been stable  It is reasonable to hold off given the complexity of recurrent anemia profound at this time  Will await their input  Also explained that given reason for the Coumadin versus aspirin  Also risk of bleeding were also reviewed with them  Orders:  -     Ambulatory referral to Neurology; Future    16  Hypercholesteremia  Assessment & Plan:  Lab Results   Component Value Date    LDLCALC 29 02/06/2023     Lab Results   Component Value Date    ALT 31 02/06/2023    ALT 34 11/09/2015     Lab Results   Component Value Date    CHOLESTEROL 98 02/06/2023    CHOLESTEROL 104 07/06/2022    CHOLESTEROL 200 03/12/2022     Lab Results   Component Value Date    HDL 47 (L) 02/06/2023    HDL 37 (L) 07/06/2022    HDL 38 (L) 03/12/2022     Lab Results   Component Value Date    TRIG 112 02/06/2023    TRIG 137 07/06/2022    TRIG 230 (H) 03/12/2022     Lab Results   Component Value Date    NONHDLC 51 02/06/2023    [place] 67 07/06/2022    [place] 189 02/17/2022     LDL tight        Orders:  -     atorvastatin (LIPITOR) 40 mg tablet; Take 1 tablet (40 mg total) by mouth daily  -     Comprehensive metabolic panel; Future; Expected date: 05/10/2023  -     Hemoglobin A1C; Future  -     Lipid panel; Future           Subjective     For follow-up of multiple medical problems      Anemia:    2/6/2023: WBC 3 43, hemoglobin 7 3, platelet 632, CMP normal except blood sugar 152, creatinine 0 59, calcium 0 calcium 8 2, GFR 85, hemoglobin A1c elevated, cholesterol 98 LDL 29 triglyceride 112 HDL 47 hemoglobin A1c elevated    2/2/2023: WBC 4 04, hemoglobin 7 5, platelet 937, total protein 6 5, iron saturation 10%, TIBC 422, iron 44, ferritin 21, cryoglobulin negative, lymphoma leukemia panel negative, serum protein electrophoresis negative, no monoclonal bands noted, Ig Kappa free light chain 37 4, Ig lambda free light chain 22 4, kappa/lambda ratio 1 67 slightly high IgA 157 IgG 773 and IgM 108  1-12-23:EGD: 1   Modera    Discussion with patient and family about anemia differential diagnosis  No active bleeding some fatigue is present  Not getting iron infusion  Advised to discuss with cardiologist about this situation given need for Coumadin as well as mechanical valve  Also recommend to discuss with him at oncologist about slightly abnormal kappa/lambda ratio  No active bleeding  Recommend to discuss with neurologist as patient stopped aspirin on her own  Diabetes symptom-free off metformin hemoglobin A1c elevated done on 2/6/2022  Hypertension symptom-free  Status post mitral replacement stable no CHF  Chronic chronic anticoagulation with atrial fibrillation stable rate controlled  Status post MVR  Anemia LDL tight will decrease to atorvastatin 40 mg  Celiac disease symptom-free  Sleep apnea fair  COPD fair  Remote history of UTI stable             Echocardiogram done on 08/11, EF 62%, basal and inferolateral wall hypokinesis  Mechanical mitral valve     08/11/2022:  Chest x-ray:  No active disease    08/11/2022:  CT scan of the brain no acute intracranial abnormality micro angiopathy changes    1-12-23:EGD: 1   Moderate degree of gastritis  2  No sign of upper GI bleeding  3  Slightly flattened small bowel villi, biopsies were done  4  Esophagus  1-12-23: Colonoscopy:  1  Suboptimal bowel prep with large amount of liquid stool throughout the colon, visualization was suboptimal  2  Colon polyp removed  3  Extensive diverticulosis throughout the colon  4  Internal hemorrho    CXR: borderline cardiomegaly   Mild vascular congestion     2-7-23: hb 7 5 wbc 2 95 plt 152  2-6-23: CMP wnl except bs 152,   Sodium                    Value: 141(mmol/L)        Dt: 02/06/2023  Potassium                 Value: 3 7(mmol/L)        Dt: 02/06/2023  Chloride                  Value: 111(mmol/L) (H)    Dt: 02/06/2023  CO2                       Value: 26(mmol/L)         Dt: 02/06/2023  ANION GAP                 Value:  4(mmol/L)          Dt: 02/06/2023  BUN                       Value: 18(mg/dL)          Dt: 02/06/2023  Creatinine                Value: 0 59(mg/dL) (L)    Dt: 02/06/2023  Glucose, Fasting          Value: 152(mg/dL) (H)     Dt: 02/06/2023  Calcium                   Value: 8 2(mg/dL) (L)     Dt: 02/06/2023  AST                       Value: 28(U/L)            Dt: 02/06/2023  ALT                       Value: 31(U/L)            Dt: 02/06/2023  Alkaline Phosphatase      Value: 96(U/L)            Dt: 02/06/2023  Total Protein             Value: 6 6(g/dL)          Dt: 02/06/2023  Albumin                   Value: 3 7(g/dL)          Dt: 02/06/2023  Total Bilirubin           Value: 0 44(mg/dL)        Dt: 02/06/2023  eGFR                      Value: 85(ml/min/1 73sq m) Dt: 02/06/2023  Cholesterol               Value: 98(mg/dL)          Dt: 02/06/2023  Triglycerides             Value: 112(mg/dL)         Dt: 02/06/2023  HDL, Direct               Value: 47(mg/dL) (L)      Dt: 02/06/2023  LDL Calculated            Value: 29(mg/dL)          Dt: 02/06/2023  Non-HDL-Chol (CHOL-HDL)   Value: 51(mg/dl)          Dt: 02/06/2023  WBC                       Value: 3 43(Thousand/uL) (L) Dt: 02/06/2023  RBC                       Value: 2 92(Million/uL) (L) Dt: 02/06/2023  Hemoglobin                Value: 7 3(g/dL) (L)      Dt: 02/06/2023  Hematocrit                Value: 27 1(%) (L)        Dt: 02/06/2023  MCV                       Value: 93(fL)             Dt: 02/06/2023  MCH                       Value: 25 0(pg) (L)       Dt: 02/06/2023  MCHC Value: 26 9(g/dL) (L)     Dt: 02/06/2023  RDW                       Value: 17 4(%) (H)        Dt: 02/06/2023  Platelets                 Value: 174(Thousands/uL)  Dt: 02/06/2023  MPV                       Value: 11 4(fL)           Dt: 02/06/2023  ------------  Appointment on 02/02/2023  WBC                       Value: 4 04(Thousand/uL) (L) Dt: 02/02/2023  RBC                       Value: 2 83(Million/uL) (L) Dt: 02/02/2023  Hemoglobin                Value: 7 5(g/dL) (L)      Dt: 02/02/2023  Hematocrit                Value: 26 5(%) (L)        Dt: 02/02/2023  MCV                       Value: 94(fL)             Dt: 02/02/2023  MCH                       Value: 26 5(pg) (L)       Dt: 02/02/2023  MCHC                      Value: 28 3(g/dL) (L)     Dt: 02/02/2023  RDW                       Value: 17 6(%) (H)        Dt: 02/02/2023  MPV                       Value: 9 7(fL)            Dt: 02/02/2023  Platelets                 Value: 187(Thousands/uL)  Dt: 02/02/2023  nRBC                      Value: 0(/100 WBCs)       Dt: 02/02/2023  Neutrophils Relative      Value: 66(%)              Dt: 02/02/2023  Immat GRANS %             Value: 0(%)               Dt: 02/02/2023  Lymphocytes Relative      Value: 20(%)              Dt: 02/02/2023  Monocytes Relative        Value: 9(%)               Dt: 02/02/2023  Eosinophils Relative      Value: 4(%)               Dt: 02/02/2023  Basophils Relative        Value: 1(%)               Dt: 02/02/2023  Neutrophils Absolute      Value: 2 65(Thousands/µL) Dt: 02/02/2023  Immature Grans Absolute   Value: 0 01(Thousand/uL)  Dt: 02/02/2023  Lymphocytes Absolute      Value: 0 82(Thousands/µL) Dt: 02/02/2023  Monocytes Absolute        Value: 0 38(Thousand/µL)  Dt: 02/02/2023  Eosinophils Absolute      Value: 0 14(Thousand/µL)  Dt: 02/02/2023  Basophils Absolute        Value: 0 04(Thousands/µL) Dt: 02/02/2023  Sodium                    Value: 142(mmol/L)        Dt: 02/02/2023  Potassium Value: 3 8(mmol/L)        Dt: 02/02/2023  Chloride                  Value: 107(mmol/L)        Dt: 02/02/2023  CO2                       Value: 28(mmol/L)         Dt: 02/02/2023  ANION GAP                 Value:  7(mmol/L)          Dt: 02/02/2023  BUN                       Value: 14(mg/dL)          Dt: 02/02/2023  Creatinine                Value: 0 58(mg/dL) (L)    Dt: 02/02/2023  Glucose                   Value: 86(mg/dL)          Dt: 02/02/2023  Calcium                   Value: 8 4(mg/dL)         Dt: 02/02/2023  AST                       Value: 33(U/L)            Dt: 02/02/2023  ALT                       Value: 32(U/L)            Dt: 02/02/2023  Alkaline Phosphatase      Value: 93(U/L)            Dt: 02/02/2023  Total Protein             Value: 6 6(g/dL)          Dt: 02/02/2023  Albumin                   Value: 3 6(g/dL)          Dt: 02/02/2023  Total Bilirubin           Value: 0 47(mg/dL)        Dt: 02/02/2023  eGFR                      Value: 85(ml/min/1 73sq m) Dt: 02/02/2023  Scan Result                                         Dt: 02/02/2023                  Value: SEE WRITTEN REPORT   DIRECT PERLA             Value: Negative           Dt: 02/02/2023  A/G Ratio                 Value: 1 42               Dt: 02/02/2023  Albumin Electrophoresis   Value: 58 6(%)            Dt: 02/02/2023  Albumin CONC              Value: 3 81(g/dl)         Dt: 02/02/2023  Alpha 1                   Value: 5 3(%) (H)         Dt: 02/02/2023  ALPHA 1 CONC              Value: 0 34(g/dL)         Dt: 02/02/2023  Alpha 2                   Value: 10 3(%)            Dt: 02/02/2023  ALPHA 2 CONC              Value: 0 67(g/dL)         Dt: 02/02/2023  Beta-1                    Value: 7 5(%)             Dt: 02/02/2023  BETA 1 CONC               Value: 0 49(g/dL)         Dt: 02/02/2023  Beta-2                    Value: 4 8(%)             Dt: 02/02/2023  BETA 2 CONC               Value: 0 31(g/dL)         Dt: 02/02/2023  Gamma Globulin Value: 13 5(%)            Dt: 02/02/2023  GAMMA CONC                Value: 0 88(g/dL)         Dt: 02/02/2023  Total Protein             Value: 6 5(g/dL)          Dt: 02/02/2023  SPEP Interpretation       Value: See Comment        Dt: 02/02/2023  Ig Kappa Free Light Chain Value: 37  4(mg/L) (H)     Dt: 02/02/2023  Ig Lambda Free Light [place]* Value: 22  4(mg/L)         Dt: 02/02/2023  Kappa/Lambda FluidC Ratio Value: 1 67 (H)           Dt: 02/02/2023  IGA                       Value: 157  0(mg/dL)       Dt: 02/02/2023  IGG                       Value: 773  0(mg/dL)       Dt: 02/02/2023  IGM                       Value: 108  0(mg/dL)       Dt: 02/02/2023  Iron Saturation           Value: 10(%) (L)          Dt: 02/02/2023  TIBC                      Value: 422(ug/dL)         Dt: 02/02/2023  Iron                      Value: 44(ug/dL) (L)      Dt: 02/02/2023  Ferritin                  Value: 21(ng/mL)          Dt: 02/02/2023  ------------  Appointment on 01/30/2023  WBC                       Value: 3 76(Thousand/uL) (L) Dt: 01/30/2023  RBC                       Value: 2 76(Million/uL) (L) Dt: 01/30/2023  Hemoglobin                Value: 7 4(g/dL) (L)      Dt: 01/30/2023  Hematocrit                Value: 26 2(%) (L)        Dt: 01/30/2023  MCV                       Value: 95(fL)             Dt: 01/30/2023  MCH                       Value: 26 8(pg)           Dt: 01/30/2023  MCHC                      Value: 28 2(g/dL) (L)     Dt: 01/30/2023  RDW                       Value: 18 4(%) (H)        Dt: 01/30/2023  Platelets                 Value: 208(Thousands/uL)  Dt: 01/30/2023  MPV                       Value: 10 6(fL)           Dt: 01/30/2023  ------------  Ancillary Orders on 01/27/2023  Protime                   Value: 34  4(seconds) (H)  Dt: 01/30/2023  INR                       Value: 3 37 (H)           Dt: 01/30/2023  ------------  Appointment on 01/23/2023  WBC                       Value: 4 19(Thousand/uL) (L) Dt: 01/23/2023  RBC Value: 2 86(Million/uL) (L) Dt: 01/23/2023  Hemoglobin                Value: 7 9(g/dL) (L)      Dt: 01/23/2023  Hematocrit                Value: 27 6(%) (L)        Dt: 01/23/2023  MCV                       Value: 97(fL)             Dt: 01/23/2023  MCH                       Value: 27 6(pg)           Dt: 01/23/2023  MCHC                      Value: 28 6(g/dL) (L)     Dt: 01/23/2023  RDW                       Value: 18 6(%) (H)        Dt: 01/23/2023  MPV                       Value: 11 6(fL)           Dt: 01/23/2023  Platelets                 Value: 198(Thousands/uL)  Dt: 01/23/2023  nRBC                      Value: 0(/100 WBCs)       Dt: 01/23/2023  Neutrophils Relative      Value: 58(%)              Dt: 01/23/2023  Immat GRANS %             Value: 1(%)               Dt: 01/23/2023  Lymphocytes Relative      Value: 23(%)              Dt: 01/23/2023  Monocytes Relative        Value: 12(%)              Dt: 01/23/2023  Eosinophils Relative      Value: 4(%)               Dt: 01/23/2023  Basophils Relative        Value: 2(%) (H)           Dt: 01/23/2023  Neutrophils Absolute      Value: 2 46(Thousands/µL) Dt: 01/23/2023  Immature Grans Absolute   Value: 0 04(Thousand/uL)  Dt: 01/23/2023  Lymphocytes Absolute      Value: 0 97(Thousands/µL) Dt: 01/23/2023  Monocytes Absolute        Value: 0 50(Thousand/µL)  Dt: 01/23/2023  Eosinophils Absolute      Value: 0 15(Thousand/µL)  Dt: 01/23/2023  Basophils Absolute        Value: 0 07(Thousands/µL) Dt: 01/23/2023  ------------  Ancillary Orders on 01/20/2023  Protime                   Value: 36  9(seconds) (H)  Dt: 01/23/2023  INR                       Value: 3 69 (H)           Dt: 01/23/2023  ------------  Admission on 01/10/2023, Discharged on 01/17/2023  No results displayed because visit has over 200 results      ------------  Ancillary Orders on 12/30/2022  Protime                   Value: 36 0(seconds) (H)  Dt: 01/04/2023  INR                       Value: 3 58 (H)           Dt: 2023  ------------  Ancillary Orders on 2022  Protime                   Value: 38 0(seconds) (H)  Dt: 2022  INR                       Value: 3 84 (H)           Dt: 2022  ------------  There may be more visits with results that are not included  - 6 moths labs          Review of Systems   Constitutional: Negative for appetite change, fatigue, fever and unexpected weight change  HENT: Negative for congestion, ear pain and sore throat  Eyes: Negative for pain and redness  Respiratory: Positive for shortness of breath  Negative for cough  Cardiovascular: Positive for leg swelling  Negative for chest pain and palpitations  Gastrointestinal: Negative for abdominal pain, diarrhea, nausea and vomiting  Endocrine: Negative for cold intolerance, polydipsia and polyuria  Genitourinary: Negative for dysuria, frequency and urgency  Musculoskeletal: Negative for arthralgias, gait problem and myalgias  Skin: Negative for rash  Allergic/Immunologic: Negative  Neurological: Negative for dizziness and headaches  Hematological: Negative for adenopathy  Psychiatric/Behavioral: Negative for behavioral problems         Past Medical History:   Diagnosis Date   • [facility] mellitus ([facility] )    • Stroke ([facility] ) 2022     Past Surgical History:   Procedure Laterality Date   • EYE SURGERY     • HYSTERECTOMY     • MITRAL VALVE REPLACEMENT       Family History   Problem Relation Age of Onset   • Heart failure Mother    • Heart attack Father    • Sleep apnea Brother      Social History     Socioeconomic History   • Marital status:      Spouse name: None   • Number of children: None   • Years of education: None   • Highest education level: None   Occupational History   • None   Tobacco Use   • Smoking status: Former     Packs/day: 2 00     Years: 30 00     Pack years: 60 00     Types: Cigarettes     Quit date:      Years since quittin 1   • Smokeless tobacco: Never   Vaping Use   • Vaping Use: Never used   Substance and Sexual Activity   • Alcohol use: Yes     Comment: special occasional   • Drug use: No   • Sexual activity: None   Other Topics Concern   • None   Social History Narrative   • None     Social Determinants of Health     Financial Resource Strain: Not on file   Food Insecurity: No Food Insecurity   • Worried About Running Out of Food in the Last Year: Never true   • Ran Out of Food in the Last Year: Never true   Transportation Needs: No Transportation Needs   • Lack of Transportation (Medical): No   • Lack of Transportation (Non-Medical):  No   Physical Activity: Not on file   Stress: Not on file   Social Connections: Not on file   Intimate Partner Violence: Not on file   Housing Stability: Low Risk    • Unable to Pay for Housing in the Last Year: No   • Number of Places Lived in the Last Year: 1   • Unstable Housing in the Last Year: No     Current Outpatient Medications on File Prior to Visit   Medication Sig   • acetaminophen (TYLENOL) 325 mg tablet Take 2 tablets (650 mg total) by mouth every 6 (six) hours as needed for mild pain or headaches   • Cholecalciferol (VITAMIN D PO) Take by mouth   • glucose blood test strip Use 1 each in the morning Use one daily   • Multiple Vitamins-Minerals (PRESERVISION AREDS PO) Take 2 tablets by mouth daily     • polyethylene glycol (MIRALAX) 17 g packet Take 17 g by mouth daily as needed (Constipation)   • warfarin (COUMADIN) 2 5 mg tablet Take 1 tablet (2 5 mg total) by mouth 3 (three) times a week On Monday Wednesday and Friday   • warfarin (COUMADIN) 5 mg tablet Take 1 tablet (5 mg total) by mouth 4 (four) times a week On Sunday, Tuesday, Thursday, Saturday (Patient taking differently: Take 5 mg by mouth in the morning)   • [DISCONTINUED] atorvastatin (LIPITOR) 80 mg tablet Take 1 tablet (80 mg total) by mouth daily   • aspirin (ECOTRIN LOW STRENGTH) 81 mg EC tablet Take 1 tablet (81 mg total) by mouth daily (Patient not taking: Reported on 2/2/2023)   • [DISCONTINUED] ferrous sulfate 324 (65 Fe) mg Take 1 tablet (324 mg total) by mouth daily before breakfast (Patient not taking: Reported on 2/10/2023)   • [DISCONTINUED] metFORMIN (GLUCOPHAGE) 500 mg tablet Take 1 tablet (500 mg total) by mouth 2 (two) times a day with meals (Patient not taking: Reported on 2/2/2023)     Allergies   Allergen Reactions   • Sulfa Antibiotics Other (See Comments)   • Sulfasalazine        There is no immunization history on file for this patient  Objective     /70   Pulse 91   Temp 98 °F (36 7 °C)   Ht 5' 5" (1 651 m)   Wt 72 4 kg (159 lb 9 6 oz)   SpO2 100%   BMI 26 56 kg/m²     Physical Exam  Vitals and nursing note reviewed  Constitutional:       General: She is not in acute distress  Appearance: Normal appearance  She is well-developed  She is not ill-appearing, toxic-appearing or diaphoretic  Comments: Short grey hair   HENT:      Head: Normocephalic and atraumatic  Mouth/Throat:      Pharynx: Oropharynx is clear  No oropharyngeal exudate or posterior oropharyngeal erythema  Eyes:      General: No scleral icterus  Right eye: No discharge  Left eye: No discharge  Conjunctiva/sclera: Conjunctivae normal    Neck:      Thyroid: No thyroid mass, thyromegaly or thyroid tenderness  Vascular: Normal carotid pulses  No carotid bruit or JVD  Cardiovascular:      Rate and Rhythm: Normal rate  Rhythm irregular  Heart sounds: S1 normal  Murmur heard  Systolic murmur is present with a grade of 2/6  Comments: S2 elevated  Pulmonary:      Effort: No respiratory distress  Breath sounds: Normal breath sounds  No stridor  No wheezing, rhonchi or rales  Chest:      Chest wall: No tenderness  Abdominal:      General: Bowel sounds are normal  There is no distension  Palpations: There is no shifting dullness, fluid wave, hepatomegaly, mass or pulsatile mass  Tenderness: There is no abdominal tenderness  There is no rebound  Hernia: There is no hernia in the umbilical area, ventral area, left inguinal area, left femoral area or right inguinal area  Musculoskeletal:         General: Normal range of motion  Cervical back: Normal range of motion  Right lower le+ Edema present  Left lower le+ Edema present  Comments: Mild varicosities present  No clinical DVT  Lymphadenopathy:      Cervical: No cervical adenopathy  Right cervical: No superficial cervical adenopathy  Skin:     General: Skin is warm  Coloration: Skin is pale  Findings: No rash  Neurological:      General: No focal deficit present  Mental Status: She is alert and oriented to person, place, and time  Mental status is at baseline  Deep Tendon Reflexes: Reflexes normal    Psychiatric:         Mood and Affect: Mood normal          Behavior: Behavior normal          Thought Content:  Thought content normal          Judgment: Judgment normal        Hillary Mistry MD

## 2023-06-08 NOTE — PHYSICAL EXAM
[de-identified] : General:\par Awake, alert, no acute distress, Patient was cooperative and appropriate during the examination.\par \par The patient's weight is overweight for height and age.\par \par Walks with without an antalgic gait.\par \par Full, painless range of motion of the neck and back.\par \par Exam of the bilateral lower extremities is intact and symmetric with regards to dermatologic, vascular, and neurologic exam. Bilateral lower extremity sensation is grossly intact to light touch in the DP/SP/T/S/S nerve distributions. Intact DF/PF/EHL. BIlateral lower extremity warm and well-perfused with brisk capillary refill.\par \par Pulmonary:\par Regular, nonlabored breathing\par \par Abdomen:\par Soft, nontender, nondistended.\par \par Lymphatic:\par No evidence of inguinal lymphadenopathy\par \par Bilateral hips Examination:\par Physical examination of the hip demonstrates normal skin without signs of skin changes or abnormalities. No erythema, warmth, or joint effusion is appreciated.\par \par Sensation is intact to light touch L2-S1\par Palpable DP/PT pulse\par EHL/FHL/TA/GSC motor function intact\par \par Range of Motion\par 110 degrees of flexion to full extension\par 45 degrees of internal rotation\par 60 degrees of external rotation\par 45 degrees of hip abduction\par 20 degrees of hip adduction\par \par Strength Testing\par Hip Flexors/Hip Extensors 5/5\par Hip Abductors/Hip Adductors 5/5\par Quadriceps/Hamstring 5/5\par Patient is able to perform a straight leg raise without difficulty.\par \par Palpation\par Mildly tender to palpation about the greater trochanter on the right, mildly tender on the left\par Not tender to palpation about the hip joint\par Not tender to palpation about the lumbar spinous processes\par Not tender to palpation about the iliac crests or sacrum\par \par Special Tests\par JULIAN test negative\par FADIR test positive for pain at the greater trochanter on the left, negative on the right\par Rosaura test negative\par Straight leg raise test negative\par \par  [de-identified] : X-rays including an AP pelvis and 2 views of the right hip were obtained in the office today on 4/20/2023 and reviewed the patient.  There is no acute fracture or dislocation.  Patient has mild degenerative changes of the hip with evidence of HO and pincer impingement.  She also has a small cam deformity.  There is calcific tendinopathy at the hip abductor insertion on the greater trochanter.\par \par X-rays including AP pelvis and 2 views of the left hip taken in the office today on 6/8/2023 reveals mild degenerative changes with a small cam deformity and some mild calcific tendinopathy over the greater trochanter with no other acute findings.

## 2023-06-08 NOTE — HISTORY OF PRESENT ILLNESS
[de-identified] : 06/08/2023 : TANNER ORTIZ  is a 48 year  old female who presents to the office for follow-up evaluation of her right hip and new injury evaluation of her left hip.  She states that since the last office visit she did physical therapy and medication in the right hip is gotten better.  She states she still has a little bit of discomfort in the right hip but generally its not bothering her too much.  She states she is doing well with her right hip and is overall pleased with her progress.  She states however over the last several weeks she noticed her left hip is starting to bother her in a similar fashion over the lateral aspect of the hip that is worse certain activities and motions and worse when she is leaning on it and alleviated with rest.  She is here for evaluation for this today.  She denies any numbness or tingling distally.  She has no other complaints today.\par \par 4/20/2023: Tanner is a pleasant 48-year-old Togolese-speaking female who presents to the office today complaining of right sided hip pain.  This visit was performed with the use of a .  The patient states her pain began several months ago but she denies any history of trauma.  The pain is activity related and alleviated by rest.  Hurts to walk long distances and it hurts when she lays on her right side at night.  She has never had pain like this before.  She has not had any treatment for.  The patient denies any fevers, chills, sweats, recent illnesses, numbness, tingling, weakness, or pain elsewhere at this time.

## 2023-07-03 ENCOUNTER — RX RENEWAL (OUTPATIENT)
Age: 48
End: 2023-07-03

## 2023-07-03 ENCOUNTER — APPOINTMENT (OUTPATIENT)
Dept: FAMILY MEDICINE | Facility: CLINIC | Age: 48
End: 2023-07-03
Payer: COMMERCIAL

## 2023-07-03 VITALS
RESPIRATION RATE: 12 BRPM | HEIGHT: 60 IN | TEMPERATURE: 98.4 F | DIASTOLIC BLOOD PRESSURE: 72 MMHG | WEIGHT: 173 LBS | SYSTOLIC BLOOD PRESSURE: 120 MMHG | OXYGEN SATURATION: 98 % | BODY MASS INDEX: 33.96 KG/M2 | HEART RATE: 82 BPM

## 2023-07-03 DIAGNOSIS — L98.9 DISORDER OF THE SKIN AND SUBCUTANEOUS TISSUE, UNSPECIFIED: ICD-10-CM

## 2023-07-03 PROCEDURE — 99214 OFFICE O/P EST MOD 30 MIN: CPT | Mod: 25

## 2023-07-03 RX ORDER — CETIRIZINE HYDROCHLORIDE 5 MG/1
5 TABLET ORAL DAILY
Qty: 30 | Refills: 0 | Status: DISCONTINUED | COMMUNITY
Start: 2022-02-18 | End: 2023-07-03

## 2023-07-03 RX ORDER — FAMOTIDINE 40 MG/1
40 TABLET, FILM COATED ORAL
Qty: 90 | Refills: 3 | Status: ACTIVE | COMMUNITY
Start: 2022-01-14 | End: 1900-01-01

## 2023-07-03 NOTE — HEALTH RISK ASSESSMENT
[No] : In the past 12 months have you used drugs other than those required for medical reasons? No [0] : 2) Feeling down, depressed, or hopeless: Not at all (0) [PHQ-2 Negative - No further assessment needed] : PHQ-2 Negative - No further assessment needed [Never] : Never [de-identified] : GI, Breast Sx, Hemeonc. hepatology, Orthopedic [[id number]] : 0

## 2023-07-03 NOTE — ASSESSMENT
[FreeTextEntry1] : CPE: 02/2022\par HIV: screening: negative 2018\par \par Mammo: 04/2023.S/P Breast bxp.Fibroadenoma-Mastodynia\par -Doing well on Primrose oil.\par F/up w/ Breast sx.\par Gyn: with Emelina Carney in [place].\par \par \par Hypothyroidism: On levothyroxine.\par \par # Anemia:\par -On iron.\par -Colonoscopy 02/2023> Polypectomy> Repeat Colonoscopy in 3 years.\par \par GERD: Hx H.P     {pylori in the past\par On omeprazole.\par H.Pylori test\par \par  DM:Dx 7/2018: Education and counseling was given to pt with diet guidance. ,\par  HbA1c:8%\par -Continue metformin 1000mg BID.\par -Continue glimepiride 4mg daily.\par -On Ozempic > increase to 1 mg weekly,\par -D/C Pioglitazone\par -Risks and benefits d/w pt.\par -F/up in 3 months.\par -Refused Dietitian and endocrinology referral. Never done\par -Ophthalmology eval done as per pt.\par \par HTN:\par on Lisinopril 5 mg\par \par # Colon cancer screening:\par - colonoscopy 02/02/23 with Dr Shah\par -Genetic specialist eval..> negative Genetic testing results.\par \par # RT hip pain:> Isquio bad syndrome?\par -Seen by Orthopedic 6/08/23\par \par # Skin lesion in left trunk:\par -Derm referral.\par \par # Immunizations:\par -Flu shot UTD

## 2023-07-03 NOTE — HISTORY OF PRESENT ILLNESS
[FreeTextEntry1] : DM f/up\par Skin lesion\par Blood test [de-identified] : 47 y/o HF, w/ hx Breast Fibroadenoma, GERD on Famotidine, better controlled DM on metformin, Amaryl and Ozempic ; Hypothyroidism on levotyroxine, HTN on lisinopril.\par last HbA1c:8\par  Seen by GI.GERd symptoms control with omeprazole and Famotidine. She reports persistent bloating. Hx H.Pylori treated in the past.\par hepatomegaly, fatty liver. Seen by Hepatology\par She is seen by Breast surgeon, Dr barnes in the past. Now with Dr Hall.\par Gyn exam with Dr Emelina Carney in [place].\par Had 2 doses Covid vaccine\par Concern about recent Positive genetic tests on daughter. She had Genetic evaluation.\par reports pain in RT hip> seen by orthopedic..\par Reports skin lesion itchy for aprox 2 month in Left flank.

## 2023-07-05 LAB
ALBUMIN SERPL ELPH-MCNC: 4.5 G/DL
ALP BLD-CCNC: 64 U/L
ALT SERPL-CCNC: 80 U/L
ANION GAP SERPL CALC-SCNC: 14 MMOL/L
AST SERPL-CCNC: 44 U/L
BILIRUB SERPL-MCNC: 0.5 MG/DL
BUN SERPL-MCNC: 9 MG/DL
CALCIUM SERPL-MCNC: 9.8 MG/DL
CHLORIDE SERPL-SCNC: 103 MMOL/L
CHOLEST SERPL-MCNC: 141 MG/DL
CO2 SERPL-SCNC: 23 MMOL/L
CREAT SERPL-MCNC: 0.72 MG/DL
CREAT SPEC-SCNC: 95 MG/DL
CREAT/PROT UR: 0.1 RATIO
EGFR: 104 ML/MIN/1.73M2
FERRITIN SERPL-MCNC: 12 NG/ML
FOLATE SERPL-MCNC: 17.6 NG/ML
GLUCOSE SERPL-MCNC: 126 MG/DL
HDLC SERPL-MCNC: 41 MG/DL
IRON SATN MFR SERPL: 9 %
IRON SERPL-MCNC: 34 UG/DL
LDLC SERPL CALC-MCNC: 72 MG/DL
NONHDLC SERPL-MCNC: 100 MG/DL
POTASSIUM SERPL-SCNC: 4.5 MMOL/L
PROT SERPL-MCNC: 7.1 G/DL
PROT UR-MCNC: 6 MG/DL
SODIUM SERPL-SCNC: 140 MMOL/L
TIBC SERPL-MCNC: 372 UG/DL
TRIGL SERPL-MCNC: 141 MG/DL
TSH SERPL-ACNC: 2.02 UIU/ML
UIBC SERPL-MCNC: 338 UG/DL
VIT B12 SERPL-MCNC: 864 PG/ML

## 2023-07-10 ENCOUNTER — NON-APPOINTMENT (OUTPATIENT)
Age: 48
End: 2023-07-10

## 2023-07-27 ENCOUNTER — APPOINTMENT (OUTPATIENT)
Dept: DERMATOLOGY | Facility: CLINIC | Age: 48
End: 2023-07-27
Payer: COMMERCIAL

## 2023-07-27 PROCEDURE — 99212 OFFICE O/P EST SF 10 MIN: CPT | Mod: 25

## 2023-07-27 PROCEDURE — 17110 DESTRUCTION B9 LES UP TO 14: CPT

## 2023-10-02 ENCOUNTER — OUTPATIENT (OUTPATIENT)
Dept: OUTPATIENT SERVICES | Facility: HOSPITAL | Age: 48
LOS: 1 days | End: 2023-10-02
Payer: COMMERCIAL

## 2023-10-02 ENCOUNTER — APPOINTMENT (OUTPATIENT)
Dept: MRI IMAGING | Facility: CLINIC | Age: 48
End: 2023-10-02
Payer: COMMERCIAL

## 2023-10-02 DIAGNOSIS — Z12.39 ENCOUNTER FOR OTHER SCREENING FOR MALIGNANT NEOPLASM OF BREAST: ICD-10-CM

## 2023-10-02 PROCEDURE — C8908: CPT

## 2023-10-02 PROCEDURE — A9585: CPT

## 2023-10-02 PROCEDURE — 77049 MRI BREAST C-+ W/CAD BI: CPT | Mod: 26

## 2023-10-02 PROCEDURE — C8937: CPT

## 2023-10-19 ENCOUNTER — APPOINTMENT (OUTPATIENT)
Dept: FAMILY MEDICINE | Facility: CLINIC | Age: 48
End: 2023-10-19
Payer: COMMERCIAL

## 2023-10-19 VITALS
HEIGHT: 60 IN | RESPIRATION RATE: 16 BRPM | HEART RATE: 90 BPM | WEIGHT: 171 LBS | OXYGEN SATURATION: 98 % | BODY MASS INDEX: 33.57 KG/M2 | DIASTOLIC BLOOD PRESSURE: 78 MMHG | SYSTOLIC BLOOD PRESSURE: 120 MMHG | TEMPERATURE: 98 F

## 2023-10-19 DIAGNOSIS — D64.9 ANEMIA, UNSPECIFIED: ICD-10-CM

## 2023-10-19 PROCEDURE — 99214 OFFICE O/P EST MOD 30 MIN: CPT | Mod: 25

## 2023-10-20 LAB
ALBUMIN SERPL ELPH-MCNC: 4.8 G/DL
ALP BLD-CCNC: 62 U/L
ALT SERPL-CCNC: 72 U/L
ANION GAP SERPL CALC-SCNC: 14 MMOL/L
AST SERPL-CCNC: 41 U/L
BASOPHILS # BLD AUTO: 0.06 K/UL
BASOPHILS NFR BLD AUTO: 0.7 %
BILIRUB SERPL-MCNC: 0.4 MG/DL
BUN SERPL-MCNC: 13 MG/DL
CALCIUM SERPL-MCNC: 9.6 MG/DL
CHLORIDE SERPL-SCNC: 104 MMOL/L
CO2 SERPL-SCNC: 22 MMOL/L
CREAT SERPL-MCNC: 0.75 MG/DL
EGFR: 99 ML/MIN/1.73M2
EOSINOPHIL # BLD AUTO: 0.18 K/UL
EOSINOPHIL NFR BLD AUTO: 2 %
ESTIMATED AVERAGE GLUCOSE: 143 MG/DL
FERRITIN SERPL-MCNC: 14 NG/ML
GLUCOSE SERPL-MCNC: 103 MG/DL
HBA1C MFR BLD HPLC: 6.6 %
HCT VFR BLD CALC: 41.5 %
HGB BLD-MCNC: 12.3 G/DL
IMM GRANULOCYTES NFR BLD AUTO: 0.2 %
LYMPHOCYTES # BLD AUTO: 2.75 K/UL
LYMPHOCYTES NFR BLD AUTO: 30.5 %
MAN DIFF?: NORMAL
MCHC RBC-ENTMCNC: 22.8 PG
MCHC RBC-ENTMCNC: 29.6 GM/DL
MCV RBC AUTO: 77 FL
MONOCYTES # BLD AUTO: 0.56 K/UL
MONOCYTES NFR BLD AUTO: 6.2 %
NEUTROPHILS # BLD AUTO: 5.45 K/UL
NEUTROPHILS NFR BLD AUTO: 60.4 %
PLATELET # BLD AUTO: 403 K/UL
POTASSIUM SERPL-SCNC: 5.1 MMOL/L
PROT SERPL-MCNC: 7.3 G/DL
RBC # BLD: 5.39 M/UL
RBC # FLD: 17.9 %
SODIUM SERPL-SCNC: 139 MMOL/L
WBC # FLD AUTO: 9.02 K/UL

## 2023-10-21 ENCOUNTER — APPOINTMENT (OUTPATIENT)
Dept: ULTRASOUND IMAGING | Facility: CLINIC | Age: 48
End: 2023-10-21
Payer: COMMERCIAL

## 2023-10-21 ENCOUNTER — RESULT REVIEW (OUTPATIENT)
Age: 48
End: 2023-10-21

## 2023-10-21 ENCOUNTER — OUTPATIENT (OUTPATIENT)
Dept: OUTPATIENT SERVICES | Facility: HOSPITAL | Age: 48
LOS: 1 days | End: 2023-10-21
Payer: COMMERCIAL

## 2023-10-21 DIAGNOSIS — D64.9 ANEMIA, UNSPECIFIED: ICD-10-CM

## 2023-10-21 DIAGNOSIS — Z00.8 ENCOUNTER FOR OTHER GENERAL EXAMINATION: ICD-10-CM

## 2023-10-21 PROCEDURE — 76830 TRANSVAGINAL US NON-OB: CPT | Mod: 26

## 2023-10-21 PROCEDURE — 76830 TRANSVAGINAL US NON-OB: CPT

## 2023-10-21 PROCEDURE — 76856 US EXAM PELVIC COMPLETE: CPT | Mod: 26

## 2023-10-21 PROCEDURE — 76856 US EXAM PELVIC COMPLETE: CPT

## 2023-10-23 ENCOUNTER — NON-APPOINTMENT (OUTPATIENT)
Age: 48
End: 2023-10-23

## 2023-10-25 DIAGNOSIS — D25.9 LEIOMYOMA OF UTERUS, UNSPECIFIED: ICD-10-CM

## 2023-10-30 ENCOUNTER — APPOINTMENT (OUTPATIENT)
Dept: SURGERY | Facility: CLINIC | Age: 48
End: 2023-10-30
Payer: COMMERCIAL

## 2023-10-30 VITALS
WEIGHT: 168 LBS | HEIGHT: 60 IN | BODY MASS INDEX: 32.98 KG/M2 | SYSTOLIC BLOOD PRESSURE: 133 MMHG | TEMPERATURE: 98.5 F | DIASTOLIC BLOOD PRESSURE: 81 MMHG

## 2023-10-30 DIAGNOSIS — D24.2 BENIGN NEOPLASM OF RIGHT BREAST: ICD-10-CM

## 2023-10-30 DIAGNOSIS — D24.1 BENIGN NEOPLASM OF RIGHT BREAST: ICD-10-CM

## 2023-10-30 PROCEDURE — 99213 OFFICE O/P EST LOW 20 MIN: CPT

## 2023-11-14 ENCOUNTER — APPOINTMENT (OUTPATIENT)
Dept: OBGYN | Facility: CLINIC | Age: 48
End: 2023-11-14
Payer: MEDICAID

## 2023-11-14 VITALS
SYSTOLIC BLOOD PRESSURE: 120 MMHG | HEIGHT: 60 IN | DIASTOLIC BLOOD PRESSURE: 80 MMHG | WEIGHT: 167 LBS | BODY MASS INDEX: 32.79 KG/M2

## 2023-11-14 DIAGNOSIS — Z12.4 ENCOUNTER FOR SCREENING FOR MALIGNANT NEOPLASM OF CERVIX: ICD-10-CM

## 2023-11-14 DIAGNOSIS — Z01.419 ENCOUNTER FOR GYNECOLOGICAL EXAMINATION (GENERAL) (ROUTINE) W/OUT ABNORMAL FINDINGS: ICD-10-CM

## 2023-11-14 PROCEDURE — 99386 PREV VISIT NEW AGE 40-64: CPT | Mod: 25

## 2023-11-15 ENCOUNTER — NON-APPOINTMENT (OUTPATIENT)
Age: 48
End: 2023-11-15

## 2023-11-17 ENCOUNTER — NON-APPOINTMENT (OUTPATIENT)
Age: 48
End: 2023-11-17

## 2023-11-21 LAB
CYTOLOGY CVX/VAG DOC THIN PREP: ABNORMAL
ESTRADIOL SERPL-MCNC: 36 PG/ML
FSH SERPL-MCNC: 9 IU/L
HPV HIGH+LOW RISK DNA PNL CVX: NOT DETECTED
LH SERPL-ACNC: 4.5 IU/L
PROLACTIN SERPL-MCNC: 14.3 NG/ML

## 2023-12-06 ENCOUNTER — APPOINTMENT (OUTPATIENT)
Dept: OBGYN | Facility: CLINIC | Age: 48
End: 2023-12-06
Payer: MEDICAID

## 2023-12-06 VITALS
HEIGHT: 60 IN | DIASTOLIC BLOOD PRESSURE: 74 MMHG | SYSTOLIC BLOOD PRESSURE: 120 MMHG | WEIGHT: 167 LBS | BODY MASS INDEX: 32.79 KG/M2

## 2023-12-06 DIAGNOSIS — N93.9 ABNORMAL UTERINE AND VAGINAL BLEEDING, UNSPECIFIED: ICD-10-CM

## 2023-12-06 DIAGNOSIS — Z32.02 ENCOUNTER FOR PREGNANCY TEST, RESULT NEGATIVE: ICD-10-CM

## 2023-12-06 LAB
HCG UR QL: NEGATIVE
QUALITY CONTROL: YES

## 2023-12-06 PROCEDURE — 58558Z: CUSTOM

## 2023-12-06 PROCEDURE — 81025 URINE PREGNANCY TEST: CPT

## 2023-12-07 ENCOUNTER — APPOINTMENT (OUTPATIENT)
Dept: OBGYN | Facility: CLINIC | Age: 48
End: 2023-12-07

## 2023-12-17 ENCOUNTER — NON-APPOINTMENT (OUTPATIENT)
Age: 48
End: 2023-12-17

## 2023-12-23 LAB — CORE LAB BIOPSY: NORMAL

## 2024-01-16 ENCOUNTER — APPOINTMENT (OUTPATIENT)
Dept: FAMILY MEDICINE | Facility: CLINIC | Age: 49
End: 2024-01-16
Payer: MEDICAID

## 2024-01-16 VITALS
OXYGEN SATURATION: 99 % | WEIGHT: 163 LBS | TEMPERATURE: 97.9 F | SYSTOLIC BLOOD PRESSURE: 120 MMHG | BODY MASS INDEX: 32 KG/M2 | HEIGHT: 60 IN | DIASTOLIC BLOOD PRESSURE: 72 MMHG | HEART RATE: 97 BPM | RESPIRATION RATE: 16 BRPM

## 2024-01-16 DIAGNOSIS — K76.0 FATTY (CHANGE OF) LIVER, NOT ELSEWHERE CLASSIFIED: ICD-10-CM

## 2024-01-16 DIAGNOSIS — E03.9 HYPOTHYROIDISM, UNSPECIFIED: ICD-10-CM

## 2024-01-16 DIAGNOSIS — R03.0 ELEVATED BLOOD-PRESSURE READING, W/OUT DIAGNOSIS OF HYPERTENSION: ICD-10-CM

## 2024-01-16 PROCEDURE — 83036 HEMOGLOBIN GLYCOSYLATED A1C: CPT | Mod: QW

## 2024-01-16 PROCEDURE — 99214 OFFICE O/P EST MOD 30 MIN: CPT | Mod: 25

## 2024-01-16 PROCEDURE — G2211 COMPLEX E/M VISIT ADD ON: CPT

## 2024-01-16 RX ORDER — ATORVASTATIN CALCIUM 10 MG/1
10 TABLET, FILM COATED ORAL DAILY
Qty: 1 | Refills: 1 | Status: ACTIVE | COMMUNITY
Start: 2022-10-14 | End: 1900-01-01

## 2024-01-16 RX ORDER — METFORMIN HYDROCHLORIDE 1000 MG/1
1000 TABLET, COATED ORAL
Qty: 180 | Refills: 1 | Status: ACTIVE | COMMUNITY
Start: 2018-07-05 | End: 1900-01-01

## 2024-01-16 RX ORDER — GLIMEPIRIDE 4 MG/1
4 TABLET ORAL DAILY
Qty: 90 | Refills: 1 | Status: ACTIVE | COMMUNITY
Start: 2019-03-29 | End: 1900-01-01

## 2024-01-16 RX ORDER — SODIUM SULFATE, POTASSIUM SULFATE AND MAGNESIUM SULFATE 1.6; 3.13; 17.5 G/177ML; G/177ML; G/177ML
17.5-3.13-1.6 SOLUTION ORAL
Qty: 1 | Refills: 0 | Status: DISCONTINUED | COMMUNITY
Start: 2022-12-07 | End: 2024-01-16

## 2024-01-16 RX ORDER — LISINOPRIL 5 MG/1
5 TABLET ORAL DAILY
Qty: 90 | Refills: 1 | Status: ACTIVE | COMMUNITY
Start: 2020-02-13 | End: 1900-01-01

## 2024-01-16 RX ORDER — LEVOTHYROXINE SODIUM 0.07 MG/1
75 TABLET ORAL
Qty: 90 | Refills: 1 | Status: ACTIVE | COMMUNITY
Start: 2018-05-23 | End: 1900-01-01

## 2024-01-16 NOTE — HEALTH RISK ASSESSMENT
[No] : In the past 12 months have you used drugs other than those required for medical reasons? No [0] : 2) Feeling down, depressed, or hopeless: Not at all (0) [PHQ-2 Negative - No further assessment needed] : PHQ-2 Negative - No further assessment needed [Never] : Never [de-identified] : GI, Breast Sx, Hemeonc. hepatology, Orthopedic [[id number]] : 0

## 2024-01-16 NOTE — REVIEW OF SYSTEMS
INTERVAL HPI/OVERNIGHT EVENTS:    MEDICATIONS  (STANDING):  apixaban 5 milliGRAM(s) Oral every 12 hours  cefTRIAXone   IVPB 1000 milliGRAM(s) IV Intermittent every 24 hours  cholecalciferol 2000 Unit(s) Oral daily  cyanocobalamin 1000 MICROGram(s) Oral daily  dextrose 5%. 1000 milliLiter(s) (50 mL/Hr) IV Continuous <Continuous>  dextrose 50% Injectable 12.5 Gram(s) IV Push once  dextrose 50% Injectable 25 Gram(s) IV Push once  dextrose 50% Injectable 25 Gram(s) IV Push once  furosemide    Tablet 40 milliGRAM(s) Oral daily  guaiFENesin  milliGRAM(s) Oral every 12 hours  hydrALAZINE 25 milliGRAM(s) Oral two times a day  insulin glargine Injectable (LANTUS) 36 Unit(s) SubCutaneous at bedtime  insulin lispro (HumaLOG) corrective regimen sliding scale   SubCutaneous three times a day before meals  insulin lispro (HumaLOG) corrective regimen sliding scale   SubCutaneous at bedtime  lactulose Syrup 10 Gram(s) Oral daily  pantoprazole    Tablet 40 milliGRAM(s) Oral before breakfast  propranolol 40 milliGRAM(s) Oral two times a day  spironolactone 25 milliGRAM(s) Oral daily  traZODone 50 milliGRAM(s) Oral at bedtime    MEDICATIONS  (PRN):  ALBUTerol    90 MICROgram(s) HFA Inhaler 2 Puff(s) Inhalation every 6 hours PRN Shortness of Breath  dextrose 40% Gel 15 Gram(s) Oral once PRN Blood Glucose LESS THAN 70 milliGRAM(s)/deciliter  glucagon  Injectable 1 milliGRAM(s) IntraMuscular once PRN Glucose LESS THAN 70 milligrams/deciliter  oxyCODONE    IR 5 milliGRAM(s) Oral every 4 hours PRN Moderate to Severe Pain      Allergies    eggs (Nausea)  No Known Drug Allergies    Intolerances            PHYSICAL EXAM:   Vital Signs:  Vital Signs Last 24 Hrs  T(C): 36.9 (2019 04:14), Max: 36.9 (2019 04:14)  T(F): 98.4 (2019 04:14), Max: 98.4 (2019 04:14)  HR: 69 (2019 04:14) (58 - 69)  BP: 100/55 (2019 04:14) (100/55 - 119/66)  BP(mean): --  RR: 18 (2019 04:14) (18 - 18)  SpO2: 96% (2019 04:14) (96% - 97%)  Daily     Daily Weight in k.5 (2019 06:39)    GENERAL:  no distress  HEENT:  NC/AT,  anicteric  CHEST:   no increased effort, breath sounds clear  HEART:  Regular rhythm  ABDOMEN:  Soft, non-tender, non-distended, normoactive bowel sounds,  no masses ,no hepato-splenomegaly, no signs of chronic liver disease  EXTEREMITIES:  no cyanosis      LABS:                        8.7    4.3   )-----------( 59       ( 2019 05:27 )             25.4     -    132<L>  |  96  |  42<H>  ----------------------------<  248<H>  4.2   |  23  |  1.58<H>    Ca    9.0      2019 05:27            RADIOLOGY & ADDITIONAL TESTS: [Negative] : Heme/Lymph

## 2024-01-16 NOTE — HISTORY OF PRESENT ILLNESS
[FreeTextEntry1] : DM f/up  [de-identified] : 47 y/o HF, w/ hx Breast Fibroadenoma, GERD on Famotidine, better controlled DM on metformin, Amaryl and Ozempic ; Hypothyroidism on levotyroxine, HTN on lisinopril.  Seen by GI.GERd symptoms control with omeprazole and Famotidine. She reports persistent bloating. Hx H.Pylori treated in the past. hepatomegaly, fatty liver. Seen by Hepatology She is seen by Breast surgeon, Dr barnes in the past. Now with Dr Hall. Gyn exam with Dr Emelina Carney in [place]. Had 2 doses Covid vaccine  Positive genetic tests on daughter. She had Genetic evaluation. pain in RT hip> seen by orthopedic.. Reports skin lesion itchy for aprox 2 month in Left flank.

## 2024-01-16 NOTE — ASSESSMENT
[FreeTextEntry1] : CPE: 02/2022 HIV: screening: negative 2018 Mammo: 04/2023.S/P Breast bxp.Fibroadenoma-Mastodynia -Doing well on Primrose oil. F/up w/ Breast sx. Gyn: with Emelina Carney in [place]. Colonoscopy: 02/2023> Polypectomy> Repeat in 3 years  # Hypothyroidism: On levothyroxine.  # Anemia: -On iron. -[facility] order  # GERD: Hx H.P {pylori in the past On omeprazole. H.Pylori test 04/2023: negative  #  DM:Dx 7/2018: Education and counseling was given to pt with diet guidance. ,  HbA1c: 6.6% -Continue metformin 1000mg BID. -Continue glimepiride 4mg daily. -On Ozempic  1 mg weekly, -D/C Pioglitazone -Risks and benefits d/w pt. -F/up in 3 months. -Refused Dietitian and endocrinology referral. Never done -Ophthalmology eval done as per pt.  # HTN: on Lisinopril 5 mg  # RT hip pain:> Isquio bad syndrome? -Seen by Orthopedic 6/08/23  # Skin lesion in left trunk: -Derm referral.  # Immunizations: -UTD

## 2024-02-11 ENCOUNTER — RX RENEWAL (OUTPATIENT)
Age: 49
End: 2024-02-11

## 2024-02-11 RX ORDER — OMEPRAZOLE 40 MG/1
40 CAPSULE, DELAYED RELEASE ORAL
Qty: 90 | Refills: 8 | Status: ACTIVE | COMMUNITY
Start: 2022-01-14 | End: 1900-01-01

## 2024-03-07 RX ORDER — SEMAGLUTIDE 0.5 MG/.5ML
0.5 INJECTION, SOLUTION SUBCUTANEOUS
Qty: 1 | Refills: 2 | Status: DISCONTINUED | COMMUNITY
Start: 2024-02-21 | End: 2024-03-07

## 2024-04-29 ENCOUNTER — APPOINTMENT (OUTPATIENT)
Dept: FAMILY MEDICINE | Facility: CLINIC | Age: 49
End: 2024-04-29
Payer: MEDICAID

## 2024-04-29 VITALS
SYSTOLIC BLOOD PRESSURE: 118 MMHG | HEIGHT: 60 IN | WEIGHT: 168 LBS | DIASTOLIC BLOOD PRESSURE: 80 MMHG | TEMPERATURE: 98.2 F | HEART RATE: 106 BPM | OXYGEN SATURATION: 98 % | RESPIRATION RATE: 15 BRPM | BODY MASS INDEX: 32.98 KG/M2

## 2024-04-29 DIAGNOSIS — E11.9 TYPE 2 DIABETES MELLITUS W/OUT COMPLICATIONS: ICD-10-CM

## 2024-04-29 DIAGNOSIS — Z00.00 ENCOUNTER FOR GENERAL ADULT MEDICAL EXAMINATION W/OUT ABNORMAL FINDINGS: ICD-10-CM

## 2024-04-29 DIAGNOSIS — M67.439 GANGLION, UNSPECIFIED WRIST: ICD-10-CM

## 2024-04-29 PROCEDURE — 83036 HEMOGLOBIN GLYCOSYLATED A1C: CPT | Mod: QW

## 2024-04-29 PROCEDURE — 99396 PREV VISIT EST AGE 40-64: CPT | Mod: 25

## 2024-04-29 RX ORDER — SEMAGLUTIDE 1.34 MG/ML
4 INJECTION, SOLUTION SUBCUTANEOUS
Qty: 2 | Refills: 3 | Status: ACTIVE | COMMUNITY
Start: 2022-10-12 | End: 1900-01-01

## 2024-04-29 NOTE — CURRENT MEDS
[Takes medication as prescribed] : does not take [None] : Patient does not have any barriers to medication adherence [FreeTextEntry1] : OFF Ozempic for 2 months

## 2024-04-29 NOTE — HEALTH RISK ASSESSMENT
[Good] : ~his/her~  mood as  good [No] : In the past 12 months have you used drugs other than those required for medical reasons? No [No falls in past year] : Patient reported no falls in the past year [0] : 2) Feeling down, depressed, or hopeless: Not at all (0) [PHQ-2 Negative - No further assessment needed] : PHQ-2 Negative - No further assessment needed [[id number]] : 0 [Patient reported mammogram was normal] : Patient reported mammogram was normal [Patient reported PAP Smear was normal] : Patient reported PAP Smear was normal [Patient reported colonoscopy was abnormal] : Patient reported colonoscopy was abnormal [HIV test declined] : HIV test declined [Hepatitis C test declined] : Hepatitis C test declined [None] : None [With Family] : lives with family [Employed] : employed [] :  [# Of Children ___] : has [unfilled] children [Sexually Active] : sexually active [Feels Safe at Home] : Feels safe at home [Fully functional (bathing, dressing, toileting, transferring, walking, feeding)] : Fully functional (bathing, dressing, toileting, transferring, walking, feeding) [Fully functional (using the telephone, shopping, preparing meals, housekeeping, doing laundry, using] : Fully functional and needs no help or supervision to perform IADLs (using the telephone, shopping, preparing meals, housekeeping, doing laundry, using transportation, managing medications and managing finances) [Reports changes in hearing] : Reports no changes in hearing [Reports changes in vision] : Reports no changes in vision [Reports changes in dental health] : Reports no changes in dental health [Smoke Detector] : smoke detector [Safety elements used in home] : safety elements used in home [Seat Belt] :  uses seat belt [TB Exposure] : is not being exposed to tuberculosis [MammogramDate] : 04/23 [PapSmearDate] : 12/23 [ColonoscopyDate] : 02/23 [ColonoscopyComments] : Polypectomy> needs to repeat 3 years [With Patient/Caregiver] : , with patient/caregiver [Reviewed no changes] : Reviewed, no changes [AdvancecareDate] : 04/24 [Never] : Never

## 2024-04-29 NOTE — HISTORY OF PRESENT ILLNESS
[FreeTextEntry1] : CPE [de-identified] : 50 y/o HF, w/ hx Breast Fibroadenoma, GERD on Famotidine, better controlled DM on metformin, Amaryl and Ozempic ; Hypothyroidism on levotyroxine, HTN on lisinopril.  Seen by GI.GERd symptoms control with omeprazole and Famotidine. She reports persistent bloating. Hx H.Pylori treated in the past. hepatomegaly, fatty liver. Seen by Hepatology She is seen by Breast surgeon, Dr barnes in the past. Now with Dr Hall. Gyn exam with Dr Emelina Carney in [place]. Had 2 doses Covid vaccine  Positive genetic tests on daughter. She had Genetic evaluation. pain in RT hip> seen by orthopedic.. Reports skin lesion itchy for aprox 2 month in Left flank.

## 2024-04-29 NOTE — PHYSICAL EXAM
[No Acute Distress] : no acute distress [Well Nourished] : well nourished [Well Developed] : well developed [Well-Appearing] : well-appearing [Normal Sclera/Conjunctiva] : normal sclera/conjunctiva [PERRL] : pupils equal round and reactive to light [EOMI] : extraocular movements intact [Normal Outer Ear/Nose] : the outer ears and nose were normal in appearance [Normal Oropharynx] : the oropharynx was normal [No JVD] : no jugular venous distention [No Lymphadenopathy] : no lymphadenopathy [Supple] : supple [Thyroid Normal, No Nodules] : the thyroid was normal and there were no nodules present [No Respiratory Distress] : no respiratory distress  [No Accessory Muscle Use] : no accessory muscle use [Clear to Auscultation] : lungs were clear to auscultation bilaterally [Normal Rate] : normal rate  [Regular Rhythm] : with a regular rhythm [Normal S1, S2] : normal S1 and S2 [No Murmur] : no murmur heard [No Carotid Bruits] : no carotid bruits [No Abdominal Bruit] : a ~M bruit was not heard ~T in the abdomen [No Varicosities] : no varicosities [Pedal Pulses Present] : the pedal pulses are present [No Edema] : there was no peripheral edema [No Palpable Aorta] : no palpable aorta [No Extremity Clubbing/Cyanosis] : no extremity clubbing/cyanosis [Soft] : abdomen soft [Non Tender] : non-tender [Non-distended] : non-distended [No Masses] : no abdominal mass palpated [No HSM] : no HSM [Normal Bowel Sounds] : normal bowel sounds [Normal Posterior Cervical Nodes] : no posterior cervical lymphadenopathy [Normal Anterior Cervical Nodes] : no anterior cervical lymphadenopathy [No CVA Tenderness] : no CVA  tenderness [No Spinal Tenderness] : no spinal tenderness [No Joint Swelling] : no joint swelling [Grossly Normal Strength/Tone] : grossly normal strength/tone [Coordination Grossly Intact] : coordination grossly intact [No Focal Deficits] : no focal deficits [Normal Gait] : normal gait [Deep Tendon Reflexes (DTR)] : deep tendon reflexes were 2+ and symmetric [Normal Affect] : the affect was normal [Normal Insight/Judgement] : insight and judgment were intact [de-identified] : left wrist ganglion cyst

## 2024-04-29 NOTE — ASSESSMENT
[FreeTextEntry1] :   CPE: Blood and UA today HIV: screening: negative 2018 Mammo: 04/2023.S/P Breast bxp.Fibroadenoma-Mastodynia -Doing well on Primrose oil. F/up w/ Breast sx. Gyn: with Emelina Carney in [place]. Colonoscopy: 02/2023> Polypectomy> Repeat in 3 years  # Hypothyroidism: On levothyroxine.  # Anemia: -On iron. -[facility] order  # GERD: Hx H.P  {pylori in the past On omeprazole. H.Pylori test 04/2023: negative  # DM:Dx 7/2018: Education and counseling was given to pt with diet guidance. ,  HbA1c: 6.6%---> 7.6% -Continue metformin 1000mg BID. -Continue glimepiride 4mg daily. -Restart Ozempic 1 mg weekly, -D/C Pioglitazone -Risks and benefits d/w pt. -F/up in 3 months. -Refused Dietitian and endocrinology referral. Never done -Ophthalmology eval done as per pt.  # HTN: on Lisinopril 5 mg  # RT hip pain:> Isquio bad syndrome? -Seen by Orthopedic 6/08/23  # Ganglion Cyst in wrist -Hand ortho referral  # Immunizations: -UTD.

## 2024-04-29 NOTE — COUNSELING
[Fall prevention counseling provided] : Fall prevention counseling provided [Adequate lighting] : Adequate lighting [None] : None [Good understanding] : Patient has a good understanding of lifestyle changes and steps needed to achieve self management goal

## 2024-04-29 NOTE — PHYSICAL EXAM
[No Acute Distress] : no acute distress [Well Nourished] : well nourished [Well Developed] : well developed [Well-Appearing] : well-appearing [Normal Sclera/Conjunctiva] : normal sclera/conjunctiva [PERRL] : pupils equal round and reactive to light [EOMI] : extraocular movements intact [Normal Outer Ear/Nose] : the outer ears and nose were normal in appearance [Normal Oropharynx] : the oropharynx was normal [No JVD] : no jugular venous distention [No Lymphadenopathy] : no lymphadenopathy [Supple] : supple [Thyroid Normal, No Nodules] : the thyroid was normal and there were no nodules present [No Respiratory Distress] : no respiratory distress  [No Accessory Muscle Use] : no accessory muscle use [Clear to Auscultation] : lungs were clear to auscultation bilaterally [Normal Rate] : normal rate  [Regular Rhythm] : with a regular rhythm [Normal S1, S2] : normal S1 and S2 [No Murmur] : no murmur heard [No Carotid Bruits] : no carotid bruits [No Abdominal Bruit] : a ~M bruit was not heard ~T in the abdomen [No Varicosities] : no varicosities [Pedal Pulses Present] : the pedal pulses are present [No Edema] : there was no peripheral edema [No Palpable Aorta] : no palpable aorta [No Extremity Clubbing/Cyanosis] : no extremity clubbing/cyanosis [Soft] : abdomen soft [Non Tender] : non-tender [Non-distended] : non-distended [No Masses] : no abdominal mass palpated [No HSM] : no HSM [Normal Bowel Sounds] : normal bowel sounds [Normal Posterior Cervical Nodes] : no posterior cervical lymphadenopathy [Normal Anterior Cervical Nodes] : no anterior cervical lymphadenopathy [No CVA Tenderness] : no CVA  tenderness [No Spinal Tenderness] : no spinal tenderness [No Joint Swelling] : no joint swelling [Grossly Normal Strength/Tone] : grossly normal strength/tone [Coordination Grossly Intact] : coordination grossly intact [No Focal Deficits] : no focal deficits [Normal Gait] : normal gait [Deep Tendon Reflexes (DTR)] : deep tendon reflexes were 2+ and symmetric [Normal Affect] : the affect was normal [Normal Insight/Judgement] : insight and judgment were intact [de-identified] : left wrist ganglion cyst

## 2024-04-29 NOTE — ASSESSMENT
[FreeTextEntry1] :   CPE: Blood and UA today HIV: screening: negative 2018 Mammo: 04/2023.S/P Breast bxp.Fibroadenoma-Mastodynia -Doing well on Primrose oil. F/up w/ Breast sx. Gyn: with Emelina Carney in [place]. Colonoscopy: 02/2023> Polypectomy> Repeat in 3 years  # Hypothyroidism: On levothyroxine.  # Anemia: -On iron. -[facility] order  # GERD: Hx H.P  {pylori in the past On omeprazole. H.Pylori test 04/2023: negative  # DM:Dx 7/2018: Education and counseling was given to pt with diet guidance. ,  HbA1c: 6.6%---> 7.6% -Continue metformin 1000mg BID. -Continue glimepiride 4mg daily. -Restart Ozempic 1 mg weekly, -D/C Pioglitazone -Risks and benefits d/w pt. -F/up in 3 months. -Refused Dietitian and endocrinology referral. Never done -Ophthalmology eval done as per pt.  # HTN: on Lisinopril 5 mg  # RT hip pain:> Isquio bad syndrome? -Seen by Orthopedic 6/08/23  # Ganglion Cyst in wrist -Hand ortho referral  # Immunizations: -UTD.

## 2024-04-29 NOTE — HEALTH RISK ASSESSMENT
[Good] : ~his/her~  mood as  good [No] : In the past 12 months have you used drugs other than those required for medical reasons? No [No falls in past year] : Patient reported no falls in the past year [0] : 2) Feeling down, depressed, or hopeless: Not at all (0) [PHQ-2 Negative - No further assessment needed] : PHQ-2 Negative - No further assessment needed [[id number]] : 0 [Patient reported mammogram was normal] : Patient reported mammogram was normal [Patient reported PAP Smear was normal] : Patient reported PAP Smear was normal [Patient reported colonoscopy was abnormal] : Patient reported colonoscopy was abnormal [HIV test declined] : HIV test declined [Hepatitis C test declined] : Hepatitis C test declined [None] : None [With Family] : lives with family [Employed] : employed [] :  [# Of Children ___] : has [unfilled] children [Sexually Active] : sexually active [Feels Safe at Home] : Feels safe at home [Fully functional (bathing, dressing, toileting, transferring, walking, feeding)] : Fully functional (bathing, dressing, toileting, transferring, walking, feeding) [Fully functional (using the telephone, shopping, preparing meals, housekeeping, doing laundry, using] : Fully functional and needs no help or supervision to perform IADLs (using the telephone, shopping, preparing meals, housekeeping, doing laundry, using transportation, managing medications and managing finances) [Reports changes in hearing] : Reports no changes in hearing [Reports changes in vision] : Reports no changes in vision [Reports changes in dental health] : Reports no changes in dental health [Smoke Detector] : smoke detector [Safety elements used in home] : safety elements used in home [Seat Belt] :  uses seat belt [TB Exposure] : is not being exposed to tuberculosis [MammogramDate] : 04/23 [PapSmearDate] : 12/23 [ColonoscopyDate] : 02/23 [ColonoscopyComments] : Polypectomy> needs to repeat 3 years [With Patient/Caregiver] : , with patient/caregiver [Reviewed no changes] : Reviewed, no changes [AdvancecareDate] : 04/24 [Never] : Never

## 2024-04-29 NOTE — HISTORY OF PRESENT ILLNESS
[FreeTextEntry1] : CPE [de-identified] : 48 y/o HF, w/ hx Breast Fibroadenoma, GERD on Famotidine, better controlled DM on metformin, Amaryl and Ozempic ; Hypothyroidism on levotyroxine, HTN on lisinopril.  Seen by GI.GERd symptoms control with omeprazole and Famotidine. She reports persistent bloating. Hx H.Pylori treated in the past. hepatomegaly, fatty liver. Seen by Hepatology She is seen by Breast surgeon, Dr barnes in the past. Now with Dr Hall. Gyn exam with Dr Emelina Carney in [place]. Had 2 doses Covid vaccine  Positive genetic tests on daughter. She had Genetic evaluation. pain in RT hip> seen by orthopedic.. Reports skin lesion itchy for aprox 2 month in Left flank.

## 2024-05-01 ENCOUNTER — NON-APPOINTMENT (OUTPATIENT)
Age: 49
End: 2024-05-01

## 2024-05-01 LAB
25(OH)D3 SERPL-MCNC: 19.2 NG/ML
ALBUMIN SERPL ELPH-MCNC: 4.2 G/DL
ALP BLD-CCNC: 57 U/L
ALT SERPL-CCNC: 35 U/L
ANION GAP SERPL CALC-SCNC: 15 MMOL/L
APPEARANCE: CLEAR
AST SERPL-CCNC: 25 U/L
BASOPHILS # BLD AUTO: 0.08 K/UL
BASOPHILS NFR BLD AUTO: 0.7 %
BILIRUB SERPL-MCNC: 0.3 MG/DL
BILIRUBIN URINE: NEGATIVE
BLOOD URINE: NEGATIVE
BUN SERPL-MCNC: 11 MG/DL
CALCIUM SERPL-MCNC: 9.6 MG/DL
CHLORIDE SERPL-SCNC: 100 MMOL/L
CHOLEST SERPL-MCNC: 158 MG/DL
CO2 SERPL-SCNC: 22 MMOL/L
COLOR: YELLOW
CREAT SERPL-MCNC: 0.72 MG/DL
EGFR: 104 ML/MIN/1.73M2
EOSINOPHIL # BLD AUTO: 0.14 K/UL
EOSINOPHIL NFR BLD AUTO: 1.2 %
GLUCOSE QUALITATIVE U: NEGATIVE MG/DL
GLUCOSE SERPL-MCNC: 154 MG/DL
HCT VFR BLD CALC: 38 %
HDLC SERPL-MCNC: 47 MG/DL
HGB BLD-MCNC: 11 G/DL
IMM GRANULOCYTES NFR BLD AUTO: 0.3 %
KETONES URINE: NEGATIVE MG/DL
LDLC SERPL CALC-MCNC: 89 MG/DL
LEUKOCYTE ESTERASE URINE: NEGATIVE
LYMPHOCYTES # BLD AUTO: 2.81 K/UL
LYMPHOCYTES NFR BLD AUTO: 23.9 %
MAN DIFF?: NORMAL
MCHC RBC-ENTMCNC: 21.9 PG
MCHC RBC-ENTMCNC: 28.9 GM/DL
MCV RBC AUTO: 75.7 FL
MONOCYTES # BLD AUTO: 0.67 K/UL
MONOCYTES NFR BLD AUTO: 5.7 %
NEUTROPHILS # BLD AUTO: 8 K/UL
NEUTROPHILS NFR BLD AUTO: 68.2 %
NITRITE URINE: NEGATIVE
NONHDLC SERPL-MCNC: 111 MG/DL
PH URINE: 6.5
PLATELET # BLD AUTO: 408 K/UL
POTASSIUM SERPL-SCNC: 4 MMOL/L
PROT SERPL-MCNC: 6.7 G/DL
PROTEIN URINE: NEGATIVE MG/DL
RBC # BLD: 5.02 M/UL
RBC # FLD: 16.8 %
SODIUM SERPL-SCNC: 137 MMOL/L
SPECIFIC GRAVITY URINE: 1
TRIGL SERPL-MCNC: 122 MG/DL
TSH SERPL-ACNC: 2.83 UIU/ML
UROBILINOGEN URINE: 0.2 MG/DL
WBC # FLD AUTO: 11.74 K/UL

## 2024-05-06 DIAGNOSIS — M25.531 PAIN IN RIGHT WRIST: ICD-10-CM

## 2024-05-07 ENCOUNTER — APPOINTMENT (OUTPATIENT)
Dept: ORTHOPEDIC SURGERY | Facility: CLINIC | Age: 49
End: 2024-05-07
Payer: MEDICAID

## 2024-05-07 VITALS
SYSTOLIC BLOOD PRESSURE: 124 MMHG | TEMPERATURE: 98.1 F | WEIGHT: 165 LBS | HEART RATE: 92 BPM | HEIGHT: 63 IN | DIASTOLIC BLOOD PRESSURE: 80 MMHG | BODY MASS INDEX: 29.23 KG/M2

## 2024-05-07 DIAGNOSIS — M25.532 PAIN IN LEFT WRIST: ICD-10-CM

## 2024-05-07 DIAGNOSIS — R22.32 LOCALIZED SWELLING, MASS AND LUMP, LEFT UPPER LIMB: ICD-10-CM

## 2024-05-07 DIAGNOSIS — M25.531 PAIN IN RIGHT WRIST: ICD-10-CM

## 2024-05-07 DIAGNOSIS — G56.00 CARPAL TUNNEL SYNDROME, UNSPECIFIED UPPER LIMB: ICD-10-CM

## 2024-05-07 DIAGNOSIS — M25.532 PAIN IN RIGHT WRIST: ICD-10-CM

## 2024-05-07 PROCEDURE — 73130 X-RAY EXAM OF HAND: CPT | Mod: LT,RT

## 2024-05-07 PROCEDURE — 99214 OFFICE O/P EST MOD 30 MIN: CPT | Mod: 25

## 2024-05-07 PROCEDURE — 20612 ASPIRATE/INJ GANGLION CYST: CPT | Mod: LT

## 2024-05-07 PROCEDURE — 20526 THER INJECTION CARP TUNNEL: CPT | Mod: 50,59

## 2024-05-07 NOTE — PHYSICAL EXAM
[de-identified] : Examination of the [bilateral] wrist reveals discomfort with compression at the level of the volar carpal tunnel eliciting numbness/tingling throughout the fingertips. Examination of the [left wrist] [dorsally] reveals a large mass that causes discomfort upon compression. It appears to ballot with ganglion-like characteristics on assessment. [de-identified] : [4] views of [bilateral hands and wrists] were obtained today in my office and were seen by me and discussed with the patient.  These negative.

## 2024-05-07 NOTE — HISTORY OF PRESENT ILLNESS
[FreeTextEntry1] : Aurelia is a 49-year-old female who presents today with chronic exacerbated bilateral hand and wrist discomfort as well as numbness and tingling.  She also reports a chronic left dorsal wrist mass.  The symptoms are bothersome during both daytime and nighttime.  The symptoms are affecting her ADLs.

## 2024-05-07 NOTE — ASSESSMENT
[FreeTextEntry1] : ASSESSMENT: The patient comes in today with chronic exacerbated bilateral hand and wrist discomfort as well as numbness and tingling.  She also reports a chronic left dorsal wrist mass.  The symptoms are bothersome during both daytime and nighttime.  The symptoms are affecting her ADLs.  Symptoms are consistent with bilateral carpal tunnel syndrome as well as a left dorsal wrist mass.  Treatment modalities were discussed, the patient elects for injections as well as left wrist mass aspiration.    The patient was adequately and thoroughly informed of my assessment of their current condition(s).  - This may diminish bodily function for the extremity.  We discussed prognosis, treatment modalities including operative and nonoperative options for the above diagnostic assessment. As always, 2nd opinion is always provided as an option. For this, when accessible, I was able to review other physicians note(s) including reviewing other tests, imaging results as well as personally view these results for my own interpretation.      Injection:   The risks and benefits of a steroid injection were discussed in detail. The risks include but are not limited to: pain, infection, swelling, flare response, bleeding, subcutaneous fat atrophy, skin depigmentation and/or elevation of blood sugar. The risk of incomplete resolution of symptoms, recurrence and additional intervention was reviewed and considered by the patient.  The patient agreed to proceed and under a sterile prep, I injected 1 unit (6mg) into 1 cc of a combination of Celestone and Lidocaine into the [bilateral carpal tunnel]. The patient tolerated the injection well.   Aspiration Consent [Left Dorsal Wrist Mass]     The risks and benefits of a [left wrist mass aspiration] were discussed in detail.  The risks include but are not limited to: pain, infection, swelling, flare response, bleeding. The risk of incomplete resolution of symptoms, recurrence and additional intervention was reviewed and considered by the patient. The patient agreed to proceed and under a sterile prep, I aspirated mucinous fluid. The patient tolerated the injection well.  The patient was adequately and thoroughly informed of my assessment of their current condition(s).   DISCUSSION: 1.  Injections and aspiration as above.  Activity modifications. 2. [x] 3. [x]

## 2024-05-08 ENCOUNTER — APPOINTMENT (OUTPATIENT)
Dept: DERMATOLOGY | Facility: CLINIC | Age: 49
End: 2024-05-08

## 2024-05-11 ENCOUNTER — OUTPATIENT (OUTPATIENT)
Dept: OUTPATIENT SERVICES | Facility: HOSPITAL | Age: 49
LOS: 1 days | End: 2024-05-11
Payer: COMMERCIAL

## 2024-05-11 ENCOUNTER — APPOINTMENT (OUTPATIENT)
Dept: ULTRASOUND IMAGING | Facility: CLINIC | Age: 49
End: 2024-05-11
Payer: COMMERCIAL

## 2024-05-11 ENCOUNTER — RESULT REVIEW (OUTPATIENT)
Age: 49
End: 2024-05-11

## 2024-05-11 ENCOUNTER — APPOINTMENT (OUTPATIENT)
Dept: MAMMOGRAPHY | Facility: CLINIC | Age: 49
End: 2024-05-11
Payer: COMMERCIAL

## 2024-05-11 DIAGNOSIS — Z12.39 ENCOUNTER FOR OTHER SCREENING FOR MALIGNANT NEOPLASM OF BREAST: ICD-10-CM

## 2024-05-11 DIAGNOSIS — Z00.8 ENCOUNTER FOR OTHER GENERAL EXAMINATION: ICD-10-CM

## 2024-05-11 PROCEDURE — 77067 SCR MAMMO BI INCL CAD: CPT | Mod: 26

## 2024-05-11 PROCEDURE — 76641 ULTRASOUND BREAST COMPLETE: CPT | Mod: 26,50

## 2024-05-11 PROCEDURE — 77067 SCR MAMMO BI INCL CAD: CPT

## 2024-05-11 PROCEDURE — 77063 BREAST TOMOSYNTHESIS BI: CPT

## 2024-05-11 PROCEDURE — 77063 BREAST TOMOSYNTHESIS BI: CPT | Mod: 26

## 2024-05-11 PROCEDURE — 76641 ULTRASOUND BREAST COMPLETE: CPT

## 2024-05-13 ENCOUNTER — APPOINTMENT (OUTPATIENT)
Dept: SURGERY | Facility: CLINIC | Age: 49
End: 2024-05-13
Payer: COMMERCIAL

## 2024-05-13 VITALS
SYSTOLIC BLOOD PRESSURE: 118 MMHG | DIASTOLIC BLOOD PRESSURE: 79 MMHG | WEIGHT: 165.44 LBS | OXYGEN SATURATION: 98 % | HEART RATE: 90 BPM | HEIGHT: 63 IN | BODY MASS INDEX: 29.31 KG/M2 | RESPIRATION RATE: 16 BRPM | TEMPERATURE: 98.2 F

## 2024-05-13 DIAGNOSIS — N60.11 DIFFUSE CYSTIC MASTOPATHY OF LEFT BREAST: ICD-10-CM

## 2024-05-13 DIAGNOSIS — R92.8 OTHER ABNORMAL AND INCONCLUSIVE FINDINGS ON DIAGNOSTIC IMAGING OF BREAST: ICD-10-CM

## 2024-05-13 DIAGNOSIS — D24.1 BENIGN NEOPLASM OF RIGHT BREAST: ICD-10-CM

## 2024-05-13 DIAGNOSIS — Z12.39 ENCOUNTER FOR OTHER SCREENING FOR MALIGNANT NEOPLASM OF BREAST: ICD-10-CM

## 2024-05-13 DIAGNOSIS — N60.12 DIFFUSE CYSTIC MASTOPATHY OF LEFT BREAST: ICD-10-CM

## 2024-05-13 DIAGNOSIS — N64.4 MASTODYNIA: ICD-10-CM

## 2024-05-13 PROCEDURE — 99213 OFFICE O/P EST LOW 20 MIN: CPT

## 2024-05-13 NOTE — REASON FOR VISIT
[Follow-Up: _____] : a [unfilled] follow-up visit [Interpreters_IDNumber] : [id number] [Interpreters_FullName] : Peter

## 2024-05-13 NOTE — HISTORY OF PRESENT ILLNESS
[FreeTextEntry1] : Problem List: 1. Right breast fibroadenomas-stable     - Right 12:00 (1.3cm)  2:00 (1.3cm), 3:00 (1cm) 2. Multiple bilateral breast nodules    -Repeat US due in  3. Family history of breast cancer     -Genetic testing negative  4. High-risk for breast cancer; TK 26.7% 5. Circumscribed ehancing masses bilateral breast, BI-RADS 3 MRI      -MRI due   CARE TEAM PCP = [facility] = [facility]   INTERVAL HISTORY: 22- - Patient presents today for followup and review of imaging. She reports she has persistent bilateral breast pain. It comes and goes but happens most days. She denies any nipple discharge or skin changes. She also expresses some concern due to a strong family history of breast cancer.   5/10/23 - 48 year old female presents for followup after recent breast biopsy significant for fibroadenoma on pathology. She met with our genetic counselors and testing was negative. High risk screening discussed with patient   10/30/23: Patient presents for 6 month follow up. Denies any breast complaints.   24- Patient presents for 6 month followup. Reports right sided breast pain, worse around her menstrual cycle. Denies other complaints.     HPI: - Patient initially saw Dr. Coon in 2017 for right breast pain and lump. She went for diagnostic imaging demonstrating bilateral breast masses that were ultimately biopsied and found to be fibroadenomas. She has since been undergoing surveillance imaging for bilateral breast masses, BIRADS 3.   P niece at 22, M cousin x2 at 34, M aunt at 42, and M aunt at 72.    [facility]IN22 - [facility]: Screening Mamm[facility] & US Breast Complete Bilateral - MAMMOGRAM: The breasts are heterogeneously dense, which may obscure small masses. No suspicious mass, suspicious microcalcifications, or other sign of malignancy is identified. There are now 2 biopsy clips in the right breast. A biopsy clip in the left breast is unchanged. Bilateral nodularity is stable. ULTRASOUND: Multiple bilateral ultrasound findings. Follow-up ultrasound in 6 month advised. - BIRADS 3: Probably Benign Finding(s).  10/06/22 - [facility]: US Breast Limited Bilateral - No significant changes. Continued ultrasound follow-up advised in 6 months, with the patient will also be due for annual bilateral mammogram. - BIRADS 3: Probably Benign Finding(s).  23 - [facility]: Bilateral Screening Mammogram & Complete Bilateral Breast Ultrasound - IMPRESSION: Recommend ultrasound-guided biopsy of new indeterminate right breast nodule at 2:00, 10 cm from the nipple, and new indeterminate left breast nodule at 8:00, 4 cm from the nipple. - BIRADS 4A: Suspicious Finding(s). Low suspicion for malignancy.  23- [facility]: Right Ultrasound Core Needle Biopsy - Q shaped marking clip at biopsy site. Pathology from right breast mass at 2:00 O'clock, 10 cm from nipple = Fibroadenoma with exaggerated intracanalicular pattern, three cores ranging from 1 cm to 1.8 cm, results are benign and concordant   10/02/23: [facility]: MRI Breast: Impression - There are multiple similar appearing circumscribed enhancing mass in the bilateral breast, some of which correlate with pathology proven benign fibroadenomas and stable masses seen on sonogram. However, given baseline MRI imaging and moderate background parenchymal enhancement. A six month breast MRI is recommended. BI-RADS 3   24- [facility]- Bilat screening mammogram/US: Density C. RIGHT- No suspicious solid mass. 2:00, 10 cm from the nipple, stable 1.3 x 0.6 x 0.9 cm mass, previously biopsied in . 7:00, 1 cm from the nipple, newly noted 0.5 x 0.4 x 0.5 cm circumscribed hypoechoic mass. Recommend 6 month follow-up. Multiple additional stable masses measuring up to 1.1 cm including additional biopsied fibroadenomas. LEFT- No suspicious solid mass. At 12:00 retroareolar, newly noted 0.8 x 0.4 x 0.8 cm circumscribed hypoechoic mass. Recommend 6 month follow-up. Stable tissue at the 8:00 position, 4 cm from the nipple without discrete mass. Additional stable masses measuring up to 1.3 cm. BIRADS 3

## 2024-05-13 NOTE — PHYSICAL EXAM
[Normocephalic] : normocephalic [Atraumatic] : atraumatic [EOMI] : extra ocular movement intact [PERRL] : pupils equal, round and reactive to light [Sclera nonicteric] : sclera nonicteric [Supple] : supple [Examined in the supine and seated position] : examined in the supine and seated position [Symmetrical] : symmetrical [Grade 1] : Ptosis Grade 1 [No dominant masses] : no dominant masses in right breast  [No dominant masses] : no dominant masses left breast [No Nipple Retraction] : no left nipple retraction [No Nipple Discharge] : no left nipple discharge [No Edema] : no edema

## 2024-08-20 ENCOUNTER — APPOINTMENT (OUTPATIENT)
Dept: FAMILY MEDICINE | Facility: CLINIC | Age: 49
End: 2024-08-20
Payer: COMMERCIAL

## 2024-08-20 VITALS
WEIGHT: 167 LBS | DIASTOLIC BLOOD PRESSURE: 78 MMHG | RESPIRATION RATE: 14 BRPM | SYSTOLIC BLOOD PRESSURE: 122 MMHG | HEART RATE: 92 BPM | OXYGEN SATURATION: 98 % | BODY MASS INDEX: 29.59 KG/M2 | HEIGHT: 63 IN

## 2024-08-20 DIAGNOSIS — R03.0 ELEVATED BLOOD-PRESSURE READING, W/OUT DIAGNOSIS OF HYPERTENSION: ICD-10-CM

## 2024-08-20 DIAGNOSIS — E11.9 TYPE 2 DIABETES MELLITUS W/OUT COMPLICATIONS: ICD-10-CM

## 2024-08-20 DIAGNOSIS — E03.9 HYPOTHYROIDISM, UNSPECIFIED: ICD-10-CM

## 2024-08-20 PROCEDURE — 83036 HEMOGLOBIN GLYCOSYLATED A1C: CPT | Mod: QW

## 2024-08-20 PROCEDURE — G2211 COMPLEX E/M VISIT ADD ON: CPT | Mod: NC

## 2024-08-20 PROCEDURE — 99214 OFFICE O/P EST MOD 30 MIN: CPT | Mod: 25

## 2024-08-20 NOTE — HEALTH RISK ASSESSMENT
[No] : In the past 12 months have you used drugs other than those required for medical reasons? No [0] : 2) Feeling down, depressed, or hopeless: Not at all (0) [PHQ-2 Negative - No further assessment needed] : PHQ-2 Negative - No further assessment needed [Never] : Never [de-identified] : GI, Breast Sx, Hemeonc. hepatology, Orthopedic [[id number]] : 0

## 2024-08-20 NOTE — HISTORY OF PRESENT ILLNESS
[FreeTextEntry1] : DM f/up  [de-identified] : 47 y/o HF, w/ hx Breast Fibroadenoma, GERD on Famotidine, better controlled DM on metformin, Amaryl and Ozempic ; Hypothyroidism on levotyroxine, HTN on lisinopril.  Seen by GI.GERd symptoms control with omeprazole and Famotidine. She reports persistent bloating. Hx H.Pylori treated in the past. hepatomegaly, fatty liver. Seen by Hepatology She is seen by Breast surgeon, Dr barnes in the past. Now with Dr Hall. Gyn exam with Dr Emelina Carney in [place]. Had 2 doses Covid vaccine  Positive genetic tests on daughter. She had Genetic evaluation. pain in RT hip> seen by orthopedic..

## 2024-08-20 NOTE — ASSESSMENT
[FreeTextEntry1] : CPE: 04/2024 HIV: screening: negative 2018 Mammo: 05/2024. S/P Breast bxp.Fibroadenoma-Mastodynia in 2023 -Doing well on Primrose oil. F/up w/ Breast sx. Gyn: with Emelina Carney in [place]. Colonoscopy: 02/2023> Polypectomy> Repeat in 3 years  # Hypothyroidism: On levothyroxine.  # Anemia: -On iron. -USG order  # GERD: Hx H.P {pylori in the past On omeprazole. H.Pylori test 04/2023: negative  #  DM:Dx 7/2018: Education and counseling was given to pt with diet guidance. ,  HbA1c: 6.6%--> 7.6%--> 6.9% -Continue metformin 1000mg BID. -Continue glimepiride 4mg daily. -On Ozempic  1 mg weekly, -Diabetic education referral -D/C Pioglitazone -Risks and benefits d/w pt. -F/up in 3 months. -Refused Dietitian and endocrinology referral. Never done -Ophthalmology eval done as per pt.  # HTN: on Lisinopril 5 mg  # RT hip pain:> Isquio bad syndrome? -Seen by Orthopedic 6/08/23  # Skin lesion in left trunk: -Derm referral.  # Immunizations: -UTD

## 2024-10-18 ENCOUNTER — OFFICE (OUTPATIENT)
Dept: URBAN - METROPOLITAN AREA CLINIC 94 | Facility: CLINIC | Age: 49
Setting detail: OPHTHALMOLOGY
End: 2024-10-18
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
## 2024-10-18 PROCEDURE — NO SHOW FE NO SHOW FEE: Performed by: OPTOMETRIST

## 2024-10-22 ENCOUNTER — APPOINTMENT (OUTPATIENT)
Dept: DERMATOLOGY | Facility: CLINIC | Age: 49
End: 2024-10-22
Payer: COMMERCIAL

## 2024-10-22 PROCEDURE — 99214 OFFICE O/P EST MOD 30 MIN: CPT

## 2024-11-09 ENCOUNTER — APPOINTMENT (OUTPATIENT)
Dept: MRI IMAGING | Facility: CLINIC | Age: 49
End: 2024-11-09
Payer: COMMERCIAL

## 2024-11-09 ENCOUNTER — OUTPATIENT (OUTPATIENT)
Dept: OUTPATIENT SERVICES | Facility: HOSPITAL | Age: 49
LOS: 1 days | End: 2024-11-09
Payer: COMMERCIAL

## 2024-11-09 DIAGNOSIS — R92.8 OTHER ABNORMAL AND INCONCLUSIVE FINDINGS ON DIAGNOSTIC IMAGING OF BREAST: ICD-10-CM

## 2024-11-09 PROCEDURE — C8937: CPT

## 2024-11-09 PROCEDURE — A9585: CPT

## 2024-11-09 PROCEDURE — 77049 MRI BREAST C-+ W/CAD BI: CPT | Mod: 26

## 2024-11-09 PROCEDURE — C8908: CPT

## 2024-11-11 ENCOUNTER — NON-APPOINTMENT (OUTPATIENT)
Age: 49
End: 2024-11-11

## 2024-11-22 ENCOUNTER — APPOINTMENT (OUTPATIENT)
Dept: FAMILY MEDICINE | Facility: CLINIC | Age: 49
End: 2024-11-22
Payer: COMMERCIAL

## 2024-11-22 VITALS
HEART RATE: 97 BPM | WEIGHT: 163 LBS | OXYGEN SATURATION: 97 % | RESPIRATION RATE: 14 BRPM | TEMPERATURE: 98.2 F | SYSTOLIC BLOOD PRESSURE: 122 MMHG | HEIGHT: 63 IN | BODY MASS INDEX: 28.88 KG/M2 | DIASTOLIC BLOOD PRESSURE: 84 MMHG

## 2024-11-22 DIAGNOSIS — R03.0 ELEVATED BLOOD-PRESSURE READING, W/OUT DIAGNOSIS OF HYPERTENSION: ICD-10-CM

## 2024-11-22 DIAGNOSIS — Z23 ENCOUNTER FOR IMMUNIZATION: ICD-10-CM

## 2024-11-22 DIAGNOSIS — E11.9 TYPE 2 DIABETES MELLITUS W/OUT COMPLICATIONS: ICD-10-CM

## 2024-11-22 LAB — HBA1C MFR BLD HPLC: 7

## 2024-11-22 PROCEDURE — G0008: CPT

## 2024-11-22 PROCEDURE — 83036 HEMOGLOBIN GLYCOSYLATED A1C: CPT | Mod: QW

## 2024-11-22 PROCEDURE — 99214 OFFICE O/P EST MOD 30 MIN: CPT | Mod: 25

## 2024-11-22 PROCEDURE — 90656 IIV3 VACC NO PRSV 0.5 ML IM: CPT

## 2024-12-09 ENCOUNTER — APPOINTMENT (OUTPATIENT)
Dept: ULTRASOUND IMAGING | Facility: CLINIC | Age: 49
End: 2024-12-09

## 2024-12-09 ENCOUNTER — RESULT REVIEW (OUTPATIENT)
Age: 49
End: 2024-12-09

## 2024-12-09 ENCOUNTER — OUTPATIENT (OUTPATIENT)
Dept: OUTPATIENT SERVICES | Facility: HOSPITAL | Age: 49
LOS: 1 days | End: 2024-12-09
Payer: COMMERCIAL

## 2024-12-09 DIAGNOSIS — R92.8 OTHER ABNORMAL AND INCONCLUSIVE FINDINGS ON DIAGNOSTIC IMAGING OF BREAST: ICD-10-CM

## 2024-12-09 PROCEDURE — 76642 ULTRASOUND BREAST LIMITED: CPT

## 2024-12-09 PROCEDURE — 76642 ULTRASOUND BREAST LIMITED: CPT | Mod: 26,50

## 2024-12-17 ENCOUNTER — APPOINTMENT (OUTPATIENT)
Dept: DERMATOLOGY | Facility: CLINIC | Age: 49
End: 2024-12-17

## 2024-12-18 ENCOUNTER — NON-APPOINTMENT (OUTPATIENT)
Age: 49
End: 2024-12-18

## 2025-01-15 ENCOUNTER — APPOINTMENT (OUTPATIENT)
Facility: CLINIC | Age: 50
End: 2025-01-15
Payer: COMMERCIAL

## 2025-01-15 VITALS
SYSTOLIC BLOOD PRESSURE: 124 MMHG | OXYGEN SATURATION: 99 % | RESPIRATION RATE: 16 BRPM | HEART RATE: 100 BPM | WEIGHT: 164 LBS | HEIGHT: 63 IN | BODY MASS INDEX: 29.06 KG/M2 | DIASTOLIC BLOOD PRESSURE: 80 MMHG

## 2025-01-15 DIAGNOSIS — R92.8 OTHER ABNORMAL AND INCONCLUSIVE FINDINGS ON DIAGNOSTIC IMAGING OF BREAST: ICD-10-CM

## 2025-01-15 DIAGNOSIS — D24.1 BENIGN NEOPLASM OF RIGHT BREAST: ICD-10-CM

## 2025-01-15 DIAGNOSIS — Z12.39 ENCOUNTER FOR OTHER SCREENING FOR MALIGNANT NEOPLASM OF BREAST: ICD-10-CM

## 2025-01-15 DIAGNOSIS — D24.2 BENIGN NEOPLASM OF RIGHT BREAST: ICD-10-CM

## 2025-01-15 PROCEDURE — 99213 OFFICE O/P EST LOW 20 MIN: CPT

## 2025-02-20 ENCOUNTER — APPOINTMENT (OUTPATIENT)
Dept: FAMILY MEDICINE | Facility: CLINIC | Age: 50
End: 2025-02-20
Payer: COMMERCIAL

## 2025-02-20 VITALS
OXYGEN SATURATION: 98 % | SYSTOLIC BLOOD PRESSURE: 130 MMHG | RESPIRATION RATE: 15 BRPM | HEIGHT: 63 IN | HEART RATE: 90 BPM | BODY MASS INDEX: 29.06 KG/M2 | DIASTOLIC BLOOD PRESSURE: 80 MMHG | WEIGHT: 164 LBS | TEMPERATURE: 98.2 F

## 2025-02-20 DIAGNOSIS — D24.1 BENIGN NEOPLASM OF RIGHT BREAST: ICD-10-CM

## 2025-02-20 DIAGNOSIS — E11.9 TYPE 2 DIABETES MELLITUS W/OUT COMPLICATIONS: ICD-10-CM

## 2025-02-20 DIAGNOSIS — E03.9 HYPOTHYROIDISM, UNSPECIFIED: ICD-10-CM

## 2025-02-20 DIAGNOSIS — D24.2 BENIGN NEOPLASM OF RIGHT BREAST: ICD-10-CM

## 2025-02-20 DIAGNOSIS — R03.0 ELEVATED BLOOD-PRESSURE READING, W/OUT DIAGNOSIS OF HYPERTENSION: ICD-10-CM

## 2025-02-20 PROCEDURE — 99214 OFFICE O/P EST MOD 30 MIN: CPT | Mod: 25

## 2025-02-20 PROCEDURE — 83036 HEMOGLOBIN GLYCOSYLATED A1C: CPT | Mod: QW

## 2025-02-20 RX ORDER — METFORMIN HYDROCHLORIDE 1000 MG/1
1000 TABLET, COATED ORAL TWICE DAILY
Qty: 180 | Refills: 1 | Status: ACTIVE | COMMUNITY
Start: 2025-02-10 | End: 1900-01-01

## 2025-04-16 ENCOUNTER — APPOINTMENT (OUTPATIENT)
Dept: GASTROENTEROLOGY | Facility: CLINIC | Age: 50
End: 2025-04-16
Payer: COMMERCIAL

## 2025-04-16 VITALS
WEIGHT: 165 LBS | SYSTOLIC BLOOD PRESSURE: 130 MMHG | BODY MASS INDEX: 29.23 KG/M2 | HEIGHT: 63 IN | HEART RATE: 66 BPM | OXYGEN SATURATION: 98 % | DIASTOLIC BLOOD PRESSURE: 90 MMHG

## 2025-04-16 DIAGNOSIS — R79.89 OTHER SPECIFIED ABNORMAL FINDINGS OF BLOOD CHEMISTRY: ICD-10-CM

## 2025-04-16 DIAGNOSIS — G56.00 CARPAL TUNNEL SYNDROME, UNSPECIFIED UPPER LIMB: ICD-10-CM

## 2025-04-16 DIAGNOSIS — K21.00 GASTRO-ESOPHAGEAL REFLUX DISEASE WITH ESOPHAGITIS, WITHOUT BLEEDING: ICD-10-CM

## 2025-04-16 DIAGNOSIS — I83.93 ASYMPTOMATIC VARICOSE VEINS OF BILATERAL LOWER EXTREMITIES: ICD-10-CM

## 2025-04-16 DIAGNOSIS — R03.0 ELEVATED BLOOD-PRESSURE READING, W/OUT DIAGNOSIS OF HYPERTENSION: ICD-10-CM

## 2025-04-16 DIAGNOSIS — N93.9 ABNORMAL UTERINE AND VAGINAL BLEEDING, UNSPECIFIED: ICD-10-CM

## 2025-04-16 DIAGNOSIS — D25.9 LEIOMYOMA OF UTERUS, UNSPECIFIED: ICD-10-CM

## 2025-04-16 DIAGNOSIS — K76.0 FATTY (CHANGE OF) LIVER, NOT ELSEWHERE CLASSIFIED: ICD-10-CM

## 2025-04-16 PROCEDURE — G2211 COMPLEX E/M VISIT ADD ON: CPT | Mod: NC

## 2025-04-16 PROCEDURE — 99214 OFFICE O/P EST MOD 30 MIN: CPT

## 2025-04-16 RX ORDER — OMEPRAZOLE 40 MG/1
40 CAPSULE, DELAYED RELEASE ORAL
Qty: 90 | Refills: 3 | Status: ACTIVE | COMMUNITY
Start: 2025-04-16 | End: 1900-01-01

## 2025-04-16 RX ORDER — FAMOTIDINE 40 MG/1
40 TABLET, FILM COATED ORAL
Qty: 90 | Refills: 3 | Status: ACTIVE | COMMUNITY
Start: 2025-04-16 | End: 1900-01-01

## 2025-04-23 ENCOUNTER — LABORATORY RESULT (OUTPATIENT)
Age: 50
End: 2025-04-23

## 2025-04-23 ENCOUNTER — APPOINTMENT (OUTPATIENT)
Dept: FAMILY MEDICINE | Facility: CLINIC | Age: 50
End: 2025-04-23
Payer: COMMERCIAL

## 2025-04-23 VITALS
OXYGEN SATURATION: 98 % | BODY MASS INDEX: 29.06 KG/M2 | HEART RATE: 100 BPM | WEIGHT: 164 LBS | RESPIRATION RATE: 12 BRPM | SYSTOLIC BLOOD PRESSURE: 110 MMHG | HEIGHT: 63 IN | DIASTOLIC BLOOD PRESSURE: 80 MMHG

## 2025-04-23 DIAGNOSIS — Z00.00 ENCOUNTER FOR GENERAL ADULT MEDICAL EXAMINATION W/OUT ABNORMAL FINDINGS: ICD-10-CM

## 2025-04-23 PROCEDURE — 99396 PREV VISIT EST AGE 40-64: CPT | Mod: 25

## 2025-04-24 LAB
ALBUMIN SERPL ELPH-MCNC: 4.5 G/DL
ALP BLD-CCNC: 62 U/L
ALT SERPL-CCNC: 22 U/L
ANION GAP SERPL CALC-SCNC: 16 MMOL/L
APPEARANCE: CLEAR
AST SERPL-CCNC: 20 U/L
BASOPHILS # BLD AUTO: 0 K/UL
BASOPHILS NFR BLD AUTO: 0 %
BILIRUB SERPL-MCNC: 0.3 MG/DL
BILIRUBIN URINE: NEGATIVE
BLOOD URINE: NEGATIVE
BUN SERPL-MCNC: 12 MG/DL
CALCIUM SERPL-MCNC: 9.7 MG/DL
CHLORIDE SERPL-SCNC: 103 MMOL/L
CHOLEST SERPL-MCNC: 153 MG/DL
CO2 SERPL-SCNC: 20 MMOL/L
COLOR: YELLOW
CREAT SERPL-MCNC: 0.73 MG/DL
CREAT SPEC-SCNC: 142 MG/DL
CREAT/PROT UR: 0.1 RATIO
EGFRCR SERPLBLD CKD-EPI 2021: 101 ML/MIN/1.73M2
EOSINOPHIL # BLD AUTO: 0.18 K/UL
EOSINOPHIL NFR BLD AUTO: 1.8 %
ESTIMATED AVERAGE GLUCOSE: 154 MG/DL
GLUCOSE QUALITATIVE U: NEGATIVE MG/DL
GLUCOSE SERPL-MCNC: 116 MG/DL
HBA1C MFR BLD HPLC: 7 %
HCT VFR BLD CALC: 36 %
HDLC SERPL-MCNC: 52 MG/DL
HGB BLD-MCNC: 10.5 G/DL
KETONES URINE: ABNORMAL MG/DL
LDLC SERPL-MCNC: 78 MG/DL
LEUKOCYTE ESTERASE URINE: NEGATIVE
LYMPHOCYTES # BLD AUTO: 2.54 K/UL
LYMPHOCYTES NFR BLD AUTO: 25 %
MAN DIFF?: NORMAL
MCHC RBC-ENTMCNC: 20.3 PG
MCHC RBC-ENTMCNC: 29.2 G/DL
MCV RBC AUTO: 69.6 FL
MONOCYTES # BLD AUTO: 0.57 K/UL
MONOCYTES NFR BLD AUTO: 5.6 %
NEUTROPHILS # BLD AUTO: 6.86 K/UL
NEUTROPHILS NFR BLD AUTO: 67.6 %
NITRITE URINE: NEGATIVE
NONHDLC SERPL-MCNC: 100 MG/DL
PH URINE: 6
PLATELET # BLD AUTO: 408 K/UL
POTASSIUM SERPL-SCNC: 4.5 MMOL/L
PROT SERPL-MCNC: 7.1 G/DL
PROT UR-MCNC: 9 MG/DL
PROTEIN URINE: NEGATIVE MG/DL
RBC # BLD: 5.17 M/UL
RBC # FLD: 19.3 %
SODIUM SERPL-SCNC: 139 MMOL/L
SPECIFIC GRAVITY URINE: 1.02
TRIGL SERPL-MCNC: 126 MG/DL
TSH SERPL-ACNC: 1.25 UIU/ML
UROBILINOGEN URINE: 0.2 MG/DL
WBC # FLD AUTO: 10.15 K/UL

## 2025-06-05 ENCOUNTER — APPOINTMENT (OUTPATIENT)
Dept: MAMMOGRAPHY | Facility: CLINIC | Age: 50
End: 2025-06-05
Payer: COMMERCIAL

## 2025-06-05 ENCOUNTER — APPOINTMENT (OUTPATIENT)
Dept: ULTRASOUND IMAGING | Facility: CLINIC | Age: 50
End: 2025-06-05
Payer: COMMERCIAL

## 2025-06-05 ENCOUNTER — RESULT REVIEW (OUTPATIENT)
Age: 50
End: 2025-06-05

## 2025-06-05 ENCOUNTER — OUTPATIENT (OUTPATIENT)
Dept: OUTPATIENT SERVICES | Facility: HOSPITAL | Age: 50
LOS: 1 days | End: 2025-06-05
Payer: COMMERCIAL

## 2025-06-05 DIAGNOSIS — D24.1 BENIGN NEOPLASM OF RIGHT BREAST: ICD-10-CM

## 2025-06-05 DIAGNOSIS — R92.8 OTHER ABNORMAL AND INCONCLUSIVE FINDINGS ON DIAGNOSTIC IMAGING OF BREAST: ICD-10-CM

## 2025-06-05 PROCEDURE — 77063 BREAST TOMOSYNTHESIS BI: CPT | Mod: 26

## 2025-06-05 PROCEDURE — 76641 ULTRASOUND BREAST COMPLETE: CPT

## 2025-06-05 PROCEDURE — 77067 SCR MAMMO BI INCL CAD: CPT | Mod: 26

## 2025-06-05 PROCEDURE — 77067 SCR MAMMO BI INCL CAD: CPT

## 2025-06-05 PROCEDURE — 76641 ULTRASOUND BREAST COMPLETE: CPT | Mod: 26,50

## 2025-06-05 PROCEDURE — 77063 BREAST TOMOSYNTHESIS BI: CPT

## 2025-06-10 ENCOUNTER — NON-APPOINTMENT (OUTPATIENT)
Age: 50
End: 2025-06-10

## 2025-07-23 ENCOUNTER — APPOINTMENT (OUTPATIENT)
Dept: FAMILY MEDICINE | Facility: CLINIC | Age: 50
End: 2025-07-23
Payer: COMMERCIAL

## 2025-07-23 VITALS
DIASTOLIC BLOOD PRESSURE: 80 MMHG | HEIGHT: 63 IN | HEART RATE: 100 BPM | SYSTOLIC BLOOD PRESSURE: 116 MMHG | OXYGEN SATURATION: 99 %

## 2025-07-23 DIAGNOSIS — R03.0 ELEVATED BLOOD-PRESSURE READING, W/OUT DIAGNOSIS OF HYPERTENSION: ICD-10-CM

## 2025-07-23 DIAGNOSIS — E03.9 HYPOTHYROIDISM, UNSPECIFIED: ICD-10-CM

## 2025-07-23 DIAGNOSIS — E11.9 TYPE 2 DIABETES MELLITUS W/OUT COMPLICATIONS: ICD-10-CM

## 2025-07-23 PROCEDURE — 99214 OFFICE O/P EST MOD 30 MIN: CPT | Mod: 25

## 2025-07-23 PROCEDURE — 83036 HEMOGLOBIN GLYCOSYLATED A1C: CPT | Mod: QW

## 2025-07-30 ENCOUNTER — APPOINTMENT (OUTPATIENT)
Facility: CLINIC | Age: 50
End: 2025-07-30
Payer: COMMERCIAL

## 2025-07-30 VITALS
BODY MASS INDEX: 29.06 KG/M2 | OXYGEN SATURATION: 98 % | HEIGHT: 63 IN | SYSTOLIC BLOOD PRESSURE: 110 MMHG | WEIGHT: 164 LBS | DIASTOLIC BLOOD PRESSURE: 80 MMHG | HEART RATE: 101 BPM | RESPIRATION RATE: 16 BRPM

## 2025-07-30 DIAGNOSIS — N60.11 DIFFUSE CYSTIC MASTOPATHY OF LEFT BREAST: ICD-10-CM

## 2025-07-30 DIAGNOSIS — N60.12 DIFFUSE CYSTIC MASTOPATHY OF LEFT BREAST: ICD-10-CM

## 2025-07-30 DIAGNOSIS — D24.2 BENIGN NEOPLASM OF LEFT BREAST: ICD-10-CM

## 2025-07-30 DIAGNOSIS — D24.1 BENIGN NEOPLASM OF RIGHT BREAST: ICD-10-CM

## 2025-07-30 DIAGNOSIS — Z12.39 ENCOUNTER FOR OTHER SCREENING FOR MALIGNANT NEOPLASM OF BREAST: ICD-10-CM

## 2025-07-30 PROCEDURE — 99213 OFFICE O/P EST LOW 20 MIN: CPT

## 2025-08-01 ENCOUNTER — APPOINTMENT (OUTPATIENT)
Dept: ORTHOPEDIC SURGERY | Facility: CLINIC | Age: 50
End: 2025-08-01

## 2025-08-05 ENCOUNTER — APPOINTMENT (OUTPATIENT)
Dept: ORTHOPEDIC SURGERY | Facility: CLINIC | Age: 50
End: 2025-08-05
Payer: COMMERCIAL

## 2025-08-05 DIAGNOSIS — G56.03 CARPAL TUNNEL SYNDROM,BILATERAL UPPER LIMBS: ICD-10-CM

## 2025-08-05 DIAGNOSIS — M25.642 STIFFNESS OF RIGHT HAND, NOT ELSEWHERE CLASSIFIED: ICD-10-CM

## 2025-08-05 DIAGNOSIS — M25.641 STIFFNESS OF RIGHT HAND, NOT ELSEWHERE CLASSIFIED: ICD-10-CM

## 2025-08-05 DIAGNOSIS — M67.432 GANGLION, LEFT WRIST: ICD-10-CM

## 2025-08-05 PROCEDURE — 20526 THER INJECTION CARP TUNNEL: CPT | Mod: 50

## 2025-08-05 PROCEDURE — 20612 ASPIRATE/INJ GANGLION CYST: CPT | Mod: LT,59

## 2025-08-05 PROCEDURE — 99214 OFFICE O/P EST MOD 30 MIN: CPT | Mod: 25

## 2025-08-18 ENCOUNTER — APPOINTMENT (OUTPATIENT)
Dept: OBGYN | Facility: CLINIC | Age: 50
End: 2025-08-18
Payer: COMMERCIAL

## 2025-08-18 VITALS
SYSTOLIC BLOOD PRESSURE: 110 MMHG | WEIGHT: 165 LBS | HEIGHT: 63 IN | BODY MASS INDEX: 29.23 KG/M2 | DIASTOLIC BLOOD PRESSURE: 72 MMHG

## 2025-08-18 DIAGNOSIS — Z01.419 ENCOUNTER FOR GYNECOLOGICAL EXAMINATION (GENERAL) (ROUTINE) W/OUT ABNORMAL FINDINGS: ICD-10-CM

## 2025-08-18 DIAGNOSIS — Z12.4 ENCOUNTER FOR SCREENING FOR MALIGNANT NEOPLASM OF CERVIX: ICD-10-CM

## 2025-08-18 DIAGNOSIS — Z11.3 ENCOUNTER FOR SCREENING FOR INFECTIONS WITH A PREDOMINANTLY SEXUAL MODE OF TRANSMISSION: ICD-10-CM

## 2025-08-18 DIAGNOSIS — Z11.51 ENCOUNTER FOR SCREENING FOR HUMAN PAPILLOMAVIRUS (HPV): ICD-10-CM

## 2025-08-18 DIAGNOSIS — N76.0 ACUTE VAGINITIS: ICD-10-CM

## 2025-08-18 PROCEDURE — 99386 PREV VISIT NEW AGE 40-64: CPT | Mod: 25

## 2025-08-18 PROCEDURE — 99459 PELVIC EXAMINATION: CPT

## 2025-08-18 PROCEDURE — 99213 OFFICE O/P EST LOW 20 MIN: CPT | Mod: 25

## 2025-08-19 ENCOUNTER — NON-APPOINTMENT (OUTPATIENT)
Age: 50
End: 2025-08-19

## 2025-08-19 LAB
BV BACTERIA RRNA VAG QL NAA+PROBE: NOT DETECTED
C GLABRATA RNA VAG QL NAA+PROBE: NOT DETECTED
C TRACH RRNA SPEC QL NAA+PROBE: NOT DETECTED
CANDIDA RRNA VAG QL PROBE: NOT DETECTED
HBV SURFACE AG SER QL: NONREACTIVE
HCV AB SER QL: NONREACTIVE
HCV S/CO RATIO: 0.18 S/CO
HIV1+2 AB SPEC QL IA.RAPID: NONREACTIVE
N GONORRHOEA RRNA SPEC QL NAA+PROBE: NOT DETECTED
T PALLIDUM AB SER QL IA: NEGATIVE
T VAGINALIS RRNA SPEC QL NAA+PROBE: NOT DETECTED

## 2025-08-20 LAB — HPV HIGH+LOW RISK DNA PNL CVX: NOT DETECTED

## 2025-08-21 LAB — CYTOLOGY CVX/VAG DOC THIN PREP: NORMAL

## 2025-08-25 LAB
FERRITIN SERPL-MCNC: 22 NG/ML
IRON SATN MFR SERPL: 9 %
IRON SERPL-MCNC: 33 UG/DL
TIBC SERPL-MCNC: 356 UG/DL
UIBC SERPL-MCNC: 323 UG/DL

## 2025-09-02 ENCOUNTER — APPOINTMENT (OUTPATIENT)
Dept: DERMATOLOGY | Facility: CLINIC | Age: 50
End: 2025-09-02
Payer: COMMERCIAL

## 2025-09-02 PROCEDURE — 99213 OFFICE O/P EST LOW 20 MIN: CPT

## 2025-09-04 ENCOUNTER — NON-APPOINTMENT (OUTPATIENT)
Age: 50
End: 2025-09-04

## 2025-09-16 ENCOUNTER — APPOINTMENT (OUTPATIENT)
Dept: ORTHOPEDIC SURGERY | Facility: CLINIC | Age: 50
End: 2025-09-16

## 2025-09-17 ENCOUNTER — APPOINTMENT (OUTPATIENT)
Dept: FAMILY MEDICINE | Facility: CLINIC | Age: 50
End: 2025-09-17
Payer: COMMERCIAL

## 2025-09-17 VITALS
SYSTOLIC BLOOD PRESSURE: 118 MMHG | BODY MASS INDEX: 29.95 KG/M2 | HEIGHT: 63 IN | TEMPERATURE: 97.2 F | RESPIRATION RATE: 14 BRPM | HEART RATE: 80 BPM | DIASTOLIC BLOOD PRESSURE: 72 MMHG | WEIGHT: 169 LBS | OXYGEN SATURATION: 98 %

## 2025-09-17 DIAGNOSIS — Z86.2 PERSONAL HISTORY OF DISEASES OF THE BLOOD AND BLOOD-FORMING ORGANS AND CERTAIN DISORDERS INVOLVING THE IMMUNE MECHANISM: ICD-10-CM

## 2025-09-17 DIAGNOSIS — K76.0 FATTY (CHANGE OF) LIVER, NOT ELSEWHERE CLASSIFIED: ICD-10-CM

## 2025-09-17 DIAGNOSIS — L98.9 DISORDER OF THE SKIN AND SUBCUTANEOUS TISSUE, UNSPECIFIED: ICD-10-CM

## 2025-09-17 PROCEDURE — 99214 OFFICE O/P EST MOD 30 MIN: CPT

## 2025-09-17 PROCEDURE — G2211 COMPLEX E/M VISIT ADD ON: CPT | Mod: NC

## (undated) DEVICE — SNARE POLYP HEXAGONAL SM 13X2.4X240

## (undated) DEVICE — FORCEP ENDOJAW AGTR LC W NDL 2.8MM 230CM DISP

## (undated) DEVICE — POLY TRAP ETRAP

## (undated) DEVICE — STERIS DEFENDO 3-PIECE KIT (AIR/WATER, SUCTION & BIOPSY VALVES)